# Patient Record
Sex: MALE | Race: AMERICAN INDIAN OR ALASKA NATIVE | Employment: OTHER | URBAN - METROPOLITAN AREA
[De-identification: names, ages, dates, MRNs, and addresses within clinical notes are randomized per-mention and may not be internally consistent; named-entity substitution may affect disease eponyms.]

---

## 2018-05-11 ENCOUNTER — HOSPITAL ENCOUNTER (OUTPATIENT)
Dept: LAB | Age: 83
Discharge: HOME | End: 2018-05-11

## 2018-05-11 LAB
ERYTHROCYTE [DISTWIDTH] IN BLOOD BY AUTOMATED COUNT: 15.6 % (ref 11.9–14.6)
HCT VFR BLD AUTO: 29.8 % (ref 41.1–50.3)
HGB BLD-MCNC: 9.7 G/DL (ref 13.6–17.2)
MCH RBC QN AUTO: 28.7 PG (ref 26.1–32.9)
MCHC RBC AUTO-ENTMCNC: 32.6 G/DL (ref 31.4–35)
MCV RBC AUTO: 88.2 FL (ref 79.6–97.8)
PLATELET # BLD AUTO: 553 K/UL (ref 150–450)
PMV BLD AUTO: 8.8 FL (ref 10.8–14.1)
RBC # BLD AUTO: 3.38 M/UL (ref 4.23–5.67)
WBC # BLD AUTO: 7.4 K/UL (ref 4.3–11.1)

## 2018-05-11 PROCEDURE — 85027 COMPLETE CBC AUTOMATED: CPT | Performed by: FAMILY MEDICINE

## 2018-06-05 PROBLEM — G47.00 INSOMNIA: Status: ACTIVE | Noted: 2018-05-09

## 2018-06-05 PROBLEM — I50.9 HEART FAILURE (HCC): Status: ACTIVE | Noted: 2018-05-06

## 2018-06-05 PROBLEM — T81.89XA PROBLEM INVOLVING SURGICAL INCISION: Status: ACTIVE | Noted: 2018-05-11

## 2018-06-05 PROBLEM — R53.81 DEBILITY: Status: ACTIVE | Noted: 2018-05-03

## 2018-06-05 PROBLEM — B36.9 FUNGAL SKIN INFECTION: Status: ACTIVE | Noted: 2018-05-11

## 2018-06-05 PROBLEM — K59.01 SLOW TRANSIT CONSTIPATION: Status: ACTIVE | Noted: 2018-05-16

## 2018-06-05 PROBLEM — I20.0 UNSTABLE ANGINA (HCC): Status: ACTIVE | Noted: 2018-04-24

## 2018-06-05 PROBLEM — R07.9 CHEST PAIN: Status: ACTIVE | Noted: 2018-04-23

## 2018-06-05 PROBLEM — R07.9 CHEST PAIN: Status: RESOLVED | Noted: 2018-04-23 | Resolved: 2018-06-05

## 2018-07-11 PROBLEM — R39.198 SLOW URINARY STREAM: Status: ACTIVE | Noted: 2018-07-11

## 2018-07-11 PROBLEM — I20.0 UNSTABLE ANGINA (HCC): Status: RESOLVED | Noted: 2018-04-24 | Resolved: 2018-07-11

## 2018-08-09 ENCOUNTER — HOSPITAL ENCOUNTER (OUTPATIENT)
Dept: GENERAL RADIOLOGY | Age: 83
Discharge: HOME | End: 2018-08-09
Payer: MEDICARE

## 2018-08-09 DIAGNOSIS — M54.50 CHRONIC BILATERAL LOW BACK PAIN WITHOUT SCIATICA: ICD-10-CM

## 2018-08-09 DIAGNOSIS — G89.29 CHRONIC BILATERAL LOW BACK PAIN WITHOUT SCIATICA: ICD-10-CM

## 2018-08-09 PROBLEM — I25.5 ISCHEMIC CARDIOMYOPATHY: Status: ACTIVE | Noted: 2018-06-22

## 2018-08-09 PROCEDURE — 72110 X-RAY EXAM L-2 SPINE 4/>VWS: CPT

## 2018-08-13 ENCOUNTER — HOSPITAL ENCOUNTER (OUTPATIENT)
Dept: PHYSICAL THERAPY | Age: 83
Discharge: HOME | End: 2018-08-13
Attending: INTERNAL MEDICINE
Payer: MEDICARE

## 2018-08-13 DIAGNOSIS — M51.37 DEGENERATION OF LUMBAR OR LUMBOSACRAL INTERVERTEBRAL DISC: ICD-10-CM

## 2018-08-13 PROCEDURE — 97140 MANUAL THERAPY 1/> REGIONS: CPT

## 2018-08-13 PROCEDURE — G8978 MOBILITY CURRENT STATUS: HCPCS

## 2018-08-13 PROCEDURE — 97161 PT EVAL LOW COMPLEX 20 MIN: CPT

## 2018-08-13 PROCEDURE — 97110 THERAPEUTIC EXERCISES: CPT

## 2018-08-13 PROCEDURE — G8979 MOBILITY GOAL STATUS: HCPCS

## 2018-08-13 NOTE — PROGRESS NOTES
Ambulatory/Rehab Services H2 Model Falls Risk Assessment    Risk Factor Pts. ·   Confusion/Disorientation/Impulsivity  []    4 ·   Symptomatic Depression  []   2 ·   Altered Elimination  []   1 ·   Dizziness/Vertigo  []   1 ·   Gender (Male)  [x]   1 ·   Any administered antiepileptics (anticonvulsants):  []   2 ·   Any administered benzodiazepines:  []   1 ·   Visual Impairment (specify):  []   1 ·   Portable Oxygen Use  []   1 ·   Orthostatic ? BP  []   1 ·   History of Recent Falls (within 3 mos.)  []   5     Ability to Rise from Chair (choose one) Pts. ·   Ability to rise in a single movement  []   0 ·   Pushes up, successful in one attempt  []   1 ·   Multiple attempts, but successful  []   3 ·   Unable to rise without assistance  []   4   Total: (5 or greater = High Risk) 1     Falls Prevention Plan:   []                Physical Limitations to Exercise (specify):   []                Mobility Assistance Device (type):   []                Exercise/Equipment Adaptation (specify):    ©2010 [facility] [address] Patent #[id number].  Federal Law prohibits the replication, distribution or use without written permission from [facility]

## 2018-08-13 NOTE — THERAPY EVALUATION
Stanislaw Ross  : 1935  [place] / [place] / Product Type: Medicare /  [address]  at [facility]  [address], [address], [address] [place] [address]  [phone]   [phone]       OUTPATIENT PHYSICAL THERAPY:Initial Assessment and Daily Note 2018    ICD-10: Treatment Diagnosis: Low back pain (M54.5)  Precautions/Allergies:   Antihistamines; Other medication; and Statins-hmg-coa reductase inhibitors   Fall Risk Score: 1 (? 5 = High Risk)  MD Orders: PT eval and treat MEDICAL/REFERRING DIAGNOSIS:  Degeneration of lumbar or lumbosacral intervertebral disc [M51.37]   DATE OF ONSET: 2018  REFERRING PHYSICIAN: Dalton Benz MD  RETURN PHYSICIAN APPOINTMENT: 9/10/18     ASSESSMENT:  Mr. Dhaval Key presents with several month history of lower back pain after having heart surgery in 2018 and spending a period of time being inactive. Pt demonstrates decreased lumbar range of motion particularly into extension, tight hamstrings, and decreased hip and core strength. Pt's pain and impairments are causing him to have difficulty getting up from a chair, bending forward and lifting objects, and standing in one place long periods of time. Pt will benefit from skilled PT to address his impairments and maximize function. PROBLEM LIST (Impacting functional limitations):  1. Decreased Strength  2. Decreased ADL/Functional Activities  3. Decreased Transfer Abilities  4. Increased Pain  5. Decreased Activity Tolerance  6. Decreased Flexibility/Joint Mobility INTERVENTIONS PLANNED:  1. Balance Exercise  2. Cold  3. Gait Training  4. Home Exercise Program (HEP)  5. Manual Therapy  6. Neuromuscular Re-education/Strengthening  7. Range of Motion (ROM)  8. Therapeutic Activites  9. Therapeutic Exercise/Strengthening     TREATMENT PLAN:  Effective Dates: 2018 TO 2018 (30 days).  Frequency/Duration: 1 time a week for 1 week, then 2x/week for 3 more weeks. GOALS: (Goals have been discussed and agreed upon with patient.)  Short-Term Functional Goals = Discharge Goals: Time Frame: 4 weeks  1. Pt will be I and compliant with HEP and symptom management strategies  2. Pt will be able to lift small objects from the floor with minimal pain or restrictions  3. Pt will report 75% improvement in getting up from a chair without needing support   4. Pt will report 7% improvement in ability to get up from bed in the morning  5. Pt will score 0/50 on Oswestry disability scale indicating normal function    Rehabilitation Potential For Stated Goals: Nic Pulido therapy, I certify that the treatment plan above will be carried out by a therapist or under their direction. Thank you for this referral,  Alfredo Velazco, PT                  The information in this section was collected on 8/13/18 (except where otherwise noted). HISTORY:   History of Present Injury/Illness (Reason for Referral): Shyanne Adhikari presents with several month history of low back pain, which started after he had heart surgery on 4/26/18. Pt reports it might be due to the fact that he was inactive for a period of time due to this surgery. He is otherwise an active person and walks 5x/week. Denies any radicular symptoms. Pain location: Low back both sides  Pain levels - Current: 1/10  At worst: 8/10  Description: Sharp   Increases pain:  Getting up from a chair or bed, bending over, standing in one place   Decreases pain:  Moving around    Past Medical History/Comorbidities:   Mr. Je Burrell  has a past medical history of Adhesive capsulitis of shoulder; Anemia, unspecified; Anxiety state, unspecified; AR (allergic rhinitis); Arthritis; CAD (coronary artery disease) (1995); [facility] ([facility]); Cerebrovascular disease, unspecified (5/27/2015); Cerumen impaction; Cervicalgia;  Chronic ethmoidal sinusitis; Chronic frontal sinusitis; Chronic ischemic heart disease, unspecified; Chronic lymphadenitis; Chronic maxillary sinusitis; Chronic pain; Coronary atherosclerosis of native coronary artery (5/27/2015); Degeneration of lumbar or lumbosacral intervertebral disc; Deviated septum; ED (erectile dysfunction); Fatigue; GERD (gastroesophageal reflux disease); H/O prostate cancer (2002); H/O seasonal allergies; High frequency hearing loss; HLD (hyperlipidemia) (6/4/2014); Hypercholesteremia; Hypertension; Hypertrophy of nasal turbinates; Loss of weight (8/13/2013); Malignant neoplasm of prostate ([facility] (8/13/2013); MI (myocardial infarction) ([facility] (1995); Nasal obstruction; Neuralgia, neuritis, and radiculitis, unspecified; Night sweat (8/19/2013); OA (osteoarthritis); Obstructive sleep apnea (10/23/2013); Organic hypersomnia, 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 Mr. Magdalena Cabrera  has a past surgical history that includes hx ptca (1995); hx hernia repair (Right, 2007); hx orthopaedic; hx lumbar laminectomy (3/2013); hx back surgery; hx other surgical (2016); hx cataract removal (Bilateral); hx tonsillectomy (childhood); pr sinus surgery proc unlisted (5/5/2016); hx heent; hx heent; and pr cardiac surg procedure unlist (04/26/2018). 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 , Disp: 60 Tab, Rfl: 6    doxazosin (CARDURA) 2 mg tablet, Take 1 Tab by mouth daily. (Patient taking differently: Take 1 mg by mouth daily.), Disp: 30 Tab, Rfl: 6    isosorbide mononitrate ER (IMDUR) 30 mg tablet, Take  by mouth daily. , Disp: , Rfl:    turmeric root extract 500 mg cap, Take 500 mg by mouth two (2) times a day., Disp: , Rfl:     cyanocobalamin (VITAMIN B-12) 1,000 mcg tablet, Take 1,000 mcg by mouth daily. , Disp: , Rfl:     omega 3-dha-epa-fish oil (FISH OIL) 100-160-1,000 mg cap, Take  by mouth daily. , Disp: , Rfl:     vitamin e (E GEMS) 100 unit capsule, Take  by mouth daily. , Disp: , Rfl:     niacin ER (NIASPAN) 500 mg tablet, Take 1 Tab by mouth nightly., Disp: 90 Tab, Rfl: 4    mirtazapine (REMERON) 15 mg tablet, Take 1 Tab by mouth nightly., Disp: 30 Tab, Rfl: 6    ezetimibe (ZETIA) 10 mg tablet, TAKE 1 TABLET DAILY, Disp: 30 Tab, Rfl: 0    pantoprazole (PROTONIX) 40 mg tablet, TAKE 1 TABLET DAILY, Disp: 90 Tab, Rfl: 3    ASPIRIN 81 mg Tab, Take 81 mg by mouth nightly., Disp: , Rfl:    Date Last Reviewed:  8/13/2018     Number of Personal Factors/Comorbidities that affect the Plan of Care: 0: LOW COMPLEXITY   EXAMINATION:     Observation/Postural Assessment: Increased thoracic kyphosis, decreased/flattened lumbar lordosis, posterior pelvic tilt    Palpation: Tenderness and decreased mobility throughout mid/lower thoracic spine, L-spine and sacrum    ROM:   Trunk flexion: 80% - tight hamstrings noted  Trunk extension: 50%  Trunk sidebend: 100% B    Strength: 4/5 B LEs    Neurological Screen: Sensation intact to light touch B LEs       Body Structures Involved:  1. Joints  2. Muscles Body Functions Affected:  1. Neuromusculoskeletal  2. Movement Related Activities and Participation Affected:  1. General Tasks and Demands  2. Mobility   Number of elements that affect the Plan of Care: 4+: HIGH COMPLEXITY   CLINICAL PRESENTATION:   Presentation: Stable and uncomplicated: LOW COMPLEXITY   CLINICAL DECISION MAKING:   Outcome Measure:    Tool Used: Modified Oswestry Low Back Pain Questionnaire  Score:  Initial: 10/50  Most Recent: X/50 (Date: -- )   Interpretation of Score: Each section is scored on a 0-5 scale, 5 representing the greatest disability. The scores of each section are added together for a total score of 50. Score 0 1-10 11-20 21-30 31-40 41-49 50   Modifier CH CI CJ CK CL CM CN     ? Mobility - Walking and Moving Around:     - CURRENT STATUS: CI - 1%-19% impaired, limited or restricted    - GOAL STATUS: CH - 0% impaired, limited or restricted    - D/C STATUS:  ---------------To be determined---------------    Medical Necessity:   · Patient demonstrates good rehab potential due to higher previous functional level. Reason for Services/Other Comments:  · Patient continues to require skilled intervention due to decreased range of motion and joint/muscle stiffness, as well as decreased muscle strength contributing to decreased functional status. Use of outcome tool(s) and clinical judgement create a POC that gives a: Clear prediction of patient's progress: LOW COMPLEXITY            TREATMENT:   (In addition to Assessment/Re-Assessment sessions the following treatments were rendered)    Present Symptoms/Complaints - 8/13/2018:  See hx  Pain: Initial:   Pain Intensity 1: 2  Post Session:  unchanged       Therapeutic Exercise: (15 minutes):  Exercises per grid below to improve mobility, strength and balance. Required moderate visual, verbal and tactile cues to promote proper body alignment, promote proper body posture and promote proper body mechanics. Progressed resistance, range and repetitions as indicated.      Date:  8/13/18 Date:   Date:     Activity/Exercise Parameters Parameters Parameters   Pt education Findings, anatomy/pathology, POC, HEP     Standing extensions 20x // bars, 10x no bars     Clamshell 15x B     SLR 15x B     Prone hip extension 10x B     Seated hamstring stretch 30s hold B               Manual Therapy: (10 minutes): was utilized and necessary because of the patient's restricted joint motion and restricted motion of soft tissue.   - PA glides grades II - III throughout lumbar and thoracic spine    Treatment/Session Assessment:  Pt understood educational interventions and agreed with plan. All above given for HEP. · Compliance with Program/Exercises: Will assess as treatment progresses. · Recommendations/Intent for next treatment session: Next visit will focus on advancements to more challenging activities.   · Variance from plan of care: None    Total Treatment Duration:  PT Patient Time In/Time Out  Time In: 0918  Time Out: Ezio [address]man [address], PT

## 2018-08-20 ENCOUNTER — HOSPITAL ENCOUNTER (OUTPATIENT)
Dept: PHYSICAL THERAPY | Age: 83
Discharge: HOME | End: 2018-08-20
Attending: INTERNAL MEDICINE
Payer: MEDICARE

## 2018-08-20 PROCEDURE — 97110 THERAPEUTIC EXERCISES: CPT

## 2018-08-20 PROCEDURE — G8979 MOBILITY GOAL STATUS: HCPCS

## 2018-08-20 PROCEDURE — G8980 MOBILITY D/C STATUS: HCPCS

## 2018-08-20 NOTE — THERAPY DISCHARGE
Roberto Gamez  : 1935  [place] / Product Type: Medicare /  [address]  at [facility]  [address], [address], [place] [address]  [phone]   [phone]       OUTPATIENT PHYSICAL THERAPY:Daily Note and Discharge 2018    ICD-10: Treatment Diagnosis: Low back pain (M54.5)  Precautions/Allergies:   Antihistamines; Other medication; and Statins-hmg-coa reductase inhibitors   Fall Risk Score: 1 (? 5 = High Risk)  MD Orders: PT eval and treat MEDICAL/REFERRING DIAGNOSIS:  Other intervertebral disc degeneration, lumbosacral region [M51.37]   DATE OF ONSET: 2018  REFERRING PHYSICIAN: Thai Ramey MD  RETURN PHYSICIAN APPOINTMENT: 9/10/18     ASSESSMENT:  Mr. Magdalena Cabrera has participated in 2 PT visits and has been compliant with HEP for the past week. Pt demonstrates improved mobility however has had no changes in symptoms in the past week. Pt understands his HEP well with some instruction today and reports he is due to follow-up with pain management. Pt is discharged from PT. TREATMENT PLAN:  Effective Dates: 2018 TO 2018 (30 days). Frequency/Duration: 1 time a week for 1 week, then 2x/week for 3 more weeks. GOALS: (Goals have been discussed and agreed upon with patient.)  Short-Term Functional Goals = Discharge Goals: Time Frame: 4 weeks  1. Pt will be I and compliant with HEP and symptom management strategies - met  2. Pt will be able to lift small objects from the floor with minimal pain or restrictions- not met  3. Pt will report 75% improvement in getting up from a chair without needing support - not met  4. Pt will report 7% improvement in ability to get up from bed in the morning- not met  5. Pt will score 0/50 on Oswestry disability scale indicating normal function- not met              The information in this section was collected on 18 (except where otherwise noted).   HISTORY: History of Present Injury/Illness (Reason for Referral): Sveta Meza presents with several month history of low back pain, which started after he had heart surgery on 4/26/18. Pt reports it might be due to the fact that he was inactive for a period of time due to this surgery. He is otherwise an active person and walks 5x/week. Denies any radicular symptoms. Pain location: Low back both sides  Pain levels - Current: 1/10  At worst: 8/10  Description: Sharp   Increases pain:  Getting up from a chair or bed, bending over, standing in one place   Decreases pain:  Moving around    Past Medical History/Comorbidities:   Mr. Haritha Duran  has a past medical history of Adhesive capsulitis of shoulder; Anemia, unspecified; Anxiety state, unspecified; AR (allergic rhinitis); Arthritis; CAD (coronary artery disease) (1995); Cancer ([address] (2002); Cerebrovascular disease, unspecified (5/27/2015); Cerumen impaction; Cervicalgia; Chronic ethmoidal sinusitis; Chronic frontal sinusitis; Chronic ischemic heart disease, unspecified; Chronic lymphadenitis; Chronic maxillary sinusitis; Chronic pain; Coronary atherosclerosis of native coronary artery (5/27/2015); Degeneration of lumbar or lumbosacral intervertebral disc; Deviated septum; ED (erectile dysfunction); Fatigue; GERD (gastroesophageal reflux disease); H/O prostate cancer (2002); H/O seasonal allergies; High frequency hearing loss; HLD (hyperlipidemia) (6/4/2014); Hypercholesteremia; Hypertension; Hypertrophy of nasal turbinates; Loss of weight (8/13/2013); Malignant neoplasm of prostate ([address] (8/13/2013); MI (myocardial infarction) ([address] (1995); Nasal obstruction; Neuralgia, neuritis, and radiculitis, unspecified; Night sweat (8/19/2013); OA (osteoarthritis); Obstructive sleep apnea (10/23/2013); Organic hypersomnia, 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    Mr. Haritha Duran  has a past surgical history that includes hx ptca (1995); hx hernia repair (Right, 2007); hx orthopaedic; hx lumbar laminectomy (3/2013); hx back surgery; hx other surgical (2016); hx cataract removal (Bilateral); hx tonsillectomy (childhood); pr sinus surgery proc unlisted (5/5/2016); hx heent; hx heent; and pr cardiac surg procedure unlist (04/26/2018). 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 , Disp: 60 Tab, Rfl: 6    doxazosin (CARDURA) 2 mg tablet, Take 1 Tab by mouth daily. (Patient taking differently: Take 1 mg by mouth daily.), Disp: 30 Tab, Rfl: 6    isosorbide mononitrate ER (IMDUR) 30 mg tablet, Take  by mouth daily. , Disp: , Rfl:     turmeric root extract 500 mg cap, Take 500 mg by mouth two (2) times a day., Disp: , Rfl:     cyanocobalamin (VITAMIN B-12) 1,000 mcg tablet, Take 1,000 mcg by mouth daily. , Disp: , Rfl:     omega 3-dha-epa-fish oil (FISH OIL) 100-160-1,000 mg cap, Take  by mouth daily. , Disp: , Rfl:     vitamin e (E GEMS) 100 unit capsule, Take  by mouth daily. , Disp: , Rfl:     niacin ER (NIASPAN) 500 mg tablet, Take 1 Tab by mouth nightly., Disp: 90 Tab, Rfl: 4    mirtazapine (REMERON) 15 mg tablet, Take 1 Tab by mouth nightly., Disp: 30 Tab, Rfl: 6    ezetimibe (ZETIA) 10 mg tablet, TAKE 1 TABLET DAILY, Disp: 30 Tab, Rfl: 0    pantoprazole (PROTONIX) 40 mg tablet, TAKE 1 TABLET DAILY, Disp: 90 Tab, Rfl: 3    ASPIRIN 81 mg Tab, Take 81 mg by mouth nightly., Disp: , Rfl:    Date Last Reviewed:  8/20/2018     EXAMINATION:     Observation/Postural Assessment: Increased thoracic kyphosis, decreased/flattened lumbar lordosis, posterior pelvic tilt    Palpation: Tenderness and decreased mobility throughout mid/lower thoracic spine, L-spine and sacrum    ROM:   Trunk flexion: 80% - tight hamstrings noted  Trunk extension: 50%  Trunk sidebend: 100% B    Strength: 4/5 B LEs    Neurological Screen: Sensation intact to light touch B LEs       CLINICAL DECISION MAKING:   Outcome Measure: Tool Used: Modified Oswestry Low Back Pain Questionnaire  Score:  Initial: 10/50  Most Recent: 10/50 (Date: 8/20/18 )   Interpretation of Score: Each section is scored on a 0-5 scale, 5 representing the greatest disability. The scores of each section are added together for a total score of 50. Score 0 1-10 11-20 21-30 31-40 41-49 50   Modifier CH CI CJ CK CL CM CN     ? Mobility - Walking and Moving Around:     - CURRENT STATUS: CI - 1%-19% impaired, limited or restricted    - GOAL STATUS: CH - 0% impaired, limited or restricted    - D/C STATUS:  CI - 1%-19% impaired, limited or restricted              TREATMENT:   (In addition to Assessment/Re-Assessment sessions the following treatments were rendered)    Present Symptoms/Complaints - 8/20/2018:  Pt reports he's been doing the exercises. He says his mobility is better but that his pain is the same. Pain: Initial:   Pain Intensity 1: 2  Post Session:  unchanged       Therapeutic Exercise: (40 minutes):  Exercises per grid below to improve mobility, strength and balance. Required moderate visual, verbal and tactile cues to promote proper body alignment, promote proper body posture and promote proper body mechanics. Progressed resistance, range and repetitions as indicated.      Date:  8/13/18 Date:  8/20/18 Date:     Activity/Exercise Parameters Parameters Parameters   Pt education Findings, anatomy/pathology, POC, HEP     Nustep  10 min    Standing extensions 20x // bars, 10x no bars 20x     Clamshell 15x B 15x B    SLR 15x B 15x B    Prone hip extension 10x B     Seated hamstring stretch 30s hold B 30s hold B            Treatment/Session Assessment:  Pt understands HEP with some cues required today.     Total Treatment Duration:  PT Patient Time In/Time Out  Time In: 0815  Time Out: 0855    Catherine Dawkins, PT

## 2018-09-13 PROBLEM — R53.81 DEBILITY: Status: RESOLVED | Noted: 2018-05-03 | Resolved: 2018-09-13

## 2018-09-13 PROBLEM — Z95.1 HX OF CABG: Status: ACTIVE | Noted: 2018-04-26

## 2018-09-13 PROBLEM — T81.89XA PROBLEM INVOLVING SURGICAL INCISION: Status: RESOLVED | Noted: 2018-05-11 | Resolved: 2018-09-13

## 2019-02-11 ENCOUNTER — HOSPITAL ENCOUNTER (EMERGENCY)
Age: 84
Discharge: HOME | End: 2019-02-11
Attending: EMERGENCY MEDICINE
Payer: MEDICARE

## 2019-02-11 ENCOUNTER — APPOINTMENT (OUTPATIENT)
Dept: GENERAL RADIOLOGY | Age: 84
End: 2019-02-11
Attending: EMERGENCY MEDICINE
Payer: MEDICARE

## 2019-02-11 VITALS
WEIGHT: 136 LBS | HEART RATE: 72 BPM | BODY MASS INDEX: 21.86 KG/M2 | HEIGHT: 66 IN | RESPIRATION RATE: 16 BRPM | TEMPERATURE: 98.4 F | SYSTOLIC BLOOD PRESSURE: 123 MMHG | OXYGEN SATURATION: 98 % | DIASTOLIC BLOOD PRESSURE: 73 MMHG

## 2019-02-11 DIAGNOSIS — M54.50 LUMBAR PAIN: Primary | ICD-10-CM

## 2019-02-11 PROCEDURE — 99283 EMERGENCY DEPT VISIT LOW MDM: CPT | Performed by: EMERGENCY MEDICINE

## 2019-02-11 PROCEDURE — 72100 X-RAY EXAM L-S SPINE 2/3 VWS: CPT

## 2019-02-11 PROCEDURE — 96372 THER/PROPH/DIAG INJ SC/IM: CPT | Performed by: EMERGENCY MEDICINE

## 2019-02-11 PROCEDURE — 74011250636 HC RX REV CODE- 250/636: Performed by: EMERGENCY MEDICINE

## 2019-02-11 RX ORDER — KETOROLAC TROMETHAMINE 30 MG/ML
15 INJECTION, SOLUTION INTRAMUSCULAR; INTRAVENOUS
Status: COMPLETED | OUTPATIENT
Start: 2019-02-11 | End: 2019-02-11

## 2019-02-11 RX ORDER — HYDROCODONE BITARTRATE AND ACETAMINOPHEN 5; 325 MG/1; MG/1
1 TABLET ORAL
Qty: 6 TAB | Refills: 0 | Status: SHIPPED | OUTPATIENT
Start: 2019-02-11 | End: 2019-05-04

## 2019-02-11 RX ADMIN — KETOROLAC TROMETHAMINE 15 MG: 30 INJECTION, SOLUTION INTRAMUSCULAR at 10:17

## 2019-02-11 NOTE — ED TRIAGE NOTES
Pt states pain in lower back for about two days. States he gets shots at the pain clinic for back pain but it was not having any pain after the shots. Denies new injury to the back.

## 2019-02-11 NOTE — ED PROVIDER NOTES
52-year-old male with a history of chronic back pain and prior lumbar laminectomy followed by pain management presents with increased low back pain for the past 3 days. He had steroid injections about 2 months ago. When he followed up at pain management 2 weeks ago, he had had no recurrence of pain so had no treatment. Pain has gradually worsened without any new activity or injury. Denies fevers or chills. Has history of prostate cancer. No radiation of pain down the legs. No focal numbness or weakness. No loss of bladder or bowel control. He has not tried anything at home for pain except tylenol. Pain worse when getting up out of a chair. The history is provided by the patient. Back Pain Pertinent negatives include no chest pain, no fever, no headaches, no abdominal pain and no weakness. Past Medical History:  
Diagnosis Date  Adhesive capsulitis of shoulder  Anemia, unspecified  Anxiety state, unspecified  AR (allergic rhinitis)  Arthritis   
 osteo generalized  CAD (coronary artery disease) 1995 MI, angioplasty  Cancer [facility]) 2002  
 prostate/xrt  Cerebrovascular disease, unspecified 5/27/2015  Cerumen impaction  Cervicalgia  Chronic ethmoidal sinusitis  Chronic frontal sinusitis  Chronic ischemic heart disease, unspecified  Chronic lymphadenitis  Chronic maxillary sinusitis  Chronic pain   
 lower back  Coronary atherosclerosis of native coronary artery 5/27/2015  Degeneration of lumbar or lumbosacral intervertebral disc  Deviated septum  ED (erectile dysfunction)  Fatigue  GERD (gastroesophageal reflux disease)   
 managed with medication  H/O prostate cancer 2002  
 treated with radiation  H/O seasonal allergies   
 managed with Nasocort spray  High frequency hearing loss  HLD (hyperlipidemia) 6/4/2014  Hypercholesteremia   
 managed with medication  Hypertension controlled with meds  Hypertrophy of nasal turbinates  Loss of weight 8/13/2013  Malignant neoplasm of prostate ([address] 8/13/2013  MI (myocardial infarction) ([address] 1995  Nasal obstruction  Neuralgia, neuritis, and radiculitis, unspecified  Night sweat 8/19/2013  OA (osteoarthritis)  Obstructive sleep apnea 10/23/2013  
 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   
 had radiation 2002  Sleep apnea  SNHL (sensorineural hearing loss) Past Surgical History:  
Procedure Laterality Date  CARDIAC SURG PROCEDURE UNLIST  04/26/2018  HX BACK SURGERY    
 HX CATARACT REMOVAL Bilateral   
 bilateral with lens implant  HX HEENT    
 S/P SMRITs, Balloon Sinuplasty  HX HEENT    
 3 different eye surgeries  HX HERNIA REPAIR Right 2007 RIH  
 HX LUMBAR LAMINECTOMY  3/2013 Dr. Logan Bence  HX ORTHOPAEDIC    
 multiple TREVOR  HX OTHER SURGICAL  2016  
 sinus  HX PTCA  1995  
 angioplasty  HX TONSILLECTOMY  childhood  SINUS SURGERY PROC UNLISTED  5/5/2016  
 sinus surgery-Dr Campbell Camacho Family History:  
Problem Relation Age of Onset  Heart Disease Father  Prostate Cancer Father  Heart Disease Mother  Stroke Mother  Cancer Sister   
     breast  
 
 
Social History Socioeconomic History  Marital status:  Spouse name: Not on file  Number of children: Not on file  Years of education: Not on file  Highest education level: Not on file Social Needs  Financial resource strain: Not on file  Food insecurity - worry: Not on file  Food insecurity - inability: Not on file  Transportation needs - medical: Not on file  Transportation needs - non-medical: Not on file Occupational History  Not on file Tobacco Use  Smoking status: Current Some Day Smoker Packs/day: 0.25   Years: 3.00  
 Pack years: 0.75  Smokeless tobacco: Never Used  Tobacco comment: Pipe and cigar Substance and Sexual Activity  Alcohol use: Yes Comment: rare occ 1 or 2 beers in summer, states he is only smoking electric cig occ.  Drug use: No  
 Sexual activity: Not on file Other Topics Concern  Not on file Social History Narrative  Not on file ALLERGIES: Antihistamines; Other medication; and Statins-hmg-coa reductase inhibitors Review of Systems Constitutional: Negative for chills and fever. HENT: Negative for hearing loss. Eyes: Negative for visual disturbance. Respiratory: Negative for cough and shortness of breath. Cardiovascular: Negative for chest pain and palpitations. Gastrointestinal: Negative for abdominal pain, diarrhea, nausea and vomiting. Genitourinary: Negative for difficulty urinating. Musculoskeletal: Positive for back pain. Skin: Negative for rash. Neurological: Negative for weakness and headaches. Psychiatric/Behavioral: Negative for confusion. Vitals:  
 02/11/19 1522 BP: 119/69 Pulse: 77 Resp: 16 Temp: 98.2 °F (36.8 °C) SpO2: 97% Weight: 61.7 kg (136 lb) Height: 5' 6\" (1.676 m) Physical Exam  
Constitutional: He appears well-developed and well-nourished. HENT:  
Head: Normocephalic and atraumatic. Right Ear: External ear normal.  
Left Ear: External ear normal.  
Nose: Nose normal.  
Mouth/Throat: Oropharynx is clear and moist.  
Eyes: Conjunctivae are normal. Pupils are equal, round, and reactive to light. Neck: Normal range of motion. Neck supple. Cardiovascular: Regular rhythm, normal heart sounds and intact distal pulses. Pulmonary/Chest: Effort normal and breath sounds normal. No respiratory distress. He has no wheezes. Abdominal: Soft. Bowel sounds are normal. He exhibits no distension. There is no tenderness. Musculoskeletal: Normal range of motion. He exhibits no edema. Lumbar back: He exhibits pain. He exhibits no tenderness. Back: 
 
Neurological: He is alert. He has normal strength. No cranial nerve deficit or sensory deficit. Normal strength and sensation bilateral lower legs Skin: Skin is warm and dry. Psychiatric: Judgment normal.  
Nursing note and vitals reviewed. MDM Number of Diagnoses or Management Options Diagnosis management comments: Parts of this document were created using dragon voice recognition software. The chart has been reviewed but errors may still be present. 10:58 AM 
Given toradol. Prior Cr normal. Advised to follow up with pain management. Will give few pills of norco for severe pain. I discussed the results of all labs, procedures, radiographs, and treatments with the patient and available family. Treatment plan is agreed upon and the patient is ready for discharge. Questions about treatment in the ED and differential diagnosis of presenting condition were answered. Patient was given verbal discharge instructions including, but not limited to, importance of returning to the emergency department for any concern of worsening or continued symptoms. Instructions were given to follow up with a primary care provider or specialist within 1-2 days. Adverse effects of medications, if prescribed, were discussed and patient was advised to refrain from significant physical activity until followed up by primary care physician and to not drive or operate heavy machinery after taking any sedating substances. Amount and/or Complexity of Data Reviewed Tests in the radiology section of CPT®: ordered and reviewed (Xr Spine Lumb 2 Or 3 V Result Date: 2/11/2019 HISTORY: Low back pain, 3 days duration. EXAM:  Lumbar spine series COMPARISON: 8/9/2018 FINDINGS: Left-sided fusion hardware again noted at L4-5.  There is near complete loss of disc space at L5-S1 with subarticular endplate sclerosis. There is multilevel facet arthropathy. There is atherosclerotic calcification of the abdominal aorta. The SI joints are patent. The pedicles are intact. IMPRESSION: Stable postsurgical change without evidence of hardware complication. Stable multilevel lumbar spondylosis. ) Tests in the medicine section of CPT®: reviewed and ordered Procedures

## 2019-02-11 NOTE — ED NOTES
I have reviewed discharge instructions with the patient. The patient verbalized understanding. Patient left ED via Discharge Method: ambulatory to Home Opportunity for questions and clarification provided. Patient given 1 scripts. To continue your aftercare when you leave the hospital, you may receive an automated call from our care team to check in on how you are doing. This is a free service and part of our promise to provide the best care and service to meet your aftercare needs.  If you have questions, or wish to unsubscribe from this service please call [phone]. Thank you for Choosing our [facility] Emergency Department.

## 2019-02-11 NOTE — DISCHARGE INSTRUCTIONS
Follow-up with pain management for further treatment. Take Norco pain medicine only as needed for severe pain. Do not take additional Tylenol and do not drive while taking this medication. Return for any worsening or concerning symptoms.

## 2019-03-18 ENCOUNTER — HOSPITAL ENCOUNTER (OUTPATIENT)
Dept: GENERAL RADIOLOGY | Age: 84
Discharge: HOME | End: 2019-03-18

## 2019-03-18 DIAGNOSIS — R06.02 SOB (SHORTNESS OF BREATH): ICD-10-CM

## 2019-05-04 ENCOUNTER — HOSPITAL ENCOUNTER (EMERGENCY)
Age: 84
Discharge: HOME | End: 2019-05-04
Attending: EMERGENCY MEDICINE
Payer: MEDICARE

## 2019-05-04 ENCOUNTER — APPOINTMENT (OUTPATIENT)
Dept: GENERAL RADIOLOGY | Age: 84
End: 2019-05-04
Attending: EMERGENCY MEDICINE
Payer: MEDICARE

## 2019-05-04 VITALS
RESPIRATION RATE: 16 BRPM | DIASTOLIC BLOOD PRESSURE: 70 MMHG | HEART RATE: 87 BPM | OXYGEN SATURATION: 99 % | SYSTOLIC BLOOD PRESSURE: 122 MMHG | TEMPERATURE: 98.1 F | BODY MASS INDEX: 22.02 KG/M2 | HEIGHT: 66 IN | WEIGHT: 137 LBS

## 2019-05-04 DIAGNOSIS — M25.512 PAIN IN JOINT OF LEFT SHOULDER: Primary | ICD-10-CM

## 2019-05-04 PROCEDURE — 99283 EMERGENCY DEPT VISIT LOW MDM: CPT | Performed by: EMERGENCY MEDICINE

## 2019-05-04 PROCEDURE — 96372 THER/PROPH/DIAG INJ SC/IM: CPT | Performed by: EMERGENCY MEDICINE

## 2019-05-04 PROCEDURE — 74011250636 HC RX REV CODE- 250/636: Performed by: EMERGENCY MEDICINE

## 2019-05-04 PROCEDURE — 74011250637 HC RX REV CODE- 250/637: Performed by: EMERGENCY MEDICINE

## 2019-05-04 PROCEDURE — 73030 X-RAY EXAM OF SHOULDER: CPT

## 2019-05-04 RX ORDER — KETOROLAC TROMETHAMINE 30 MG/ML
60 INJECTION, SOLUTION INTRAMUSCULAR; INTRAVENOUS
Status: COMPLETED | OUTPATIENT
Start: 2019-05-04 | End: 2019-05-04

## 2019-05-04 RX ORDER — METHOCARBAMOL 750 MG/1
1500 TABLET, FILM COATED ORAL
Status: COMPLETED | OUTPATIENT
Start: 2019-05-04 | End: 2019-05-04

## 2019-05-04 RX ORDER — HYDROCODONE BITARTRATE AND ACETAMINOPHEN 5; 325 MG/1; MG/1
1 TABLET ORAL
Qty: 5 TAB | Refills: 0 | Status: SHIPPED | OUTPATIENT
Start: 2019-05-04 | End: 2019-05-06

## 2019-05-04 RX ADMIN — METHOCARBAMOL 1500 MG: 750 TABLET ORAL at 10:10

## 2019-05-04 RX ADMIN — KETOROLAC TROMETHAMINE 60 MG: 30 INJECTION, SOLUTION INTRAMUSCULAR at 10:10

## 2019-05-04 NOTE — ED PROVIDER NOTES
81-year-old male presenting for acute on chronic left shoulder pain. Symptoms started a week ago. The pain persistent. It hurts to elevate his arm. He's had this pain before. He called his doctor for pain control is prescribed tramadol he states \"that doesn't work\". He denies neck pain, chest pain, short of breath. The pain and describes aching pain in the anterior compartment of the deltoid and some pain in the left thumb. She made worse when he tries to elevate the arm. He denies any fall or direct blow to the shoulder. He's also been seen by his cardiologist in the past week while having the shoulder pain and referred to orthopedics this appointment is Monday and states \"I want to be in pain all weekend\". He's tried no other medications except tramadol. He denies weakness. He denies paresthesias. The history is provided by the patient. Arm Pain This is a recurrent problem. The current episode started more than 1 week ago. The problem occurs constantly. The problem has not changed since onset. Pain location: left shoulder. The quality of the pain is described as dull and aching. The pain is at a severity of 4/10. The pain is moderate. Associated symptoms include limited range of motion and stiffness. Pertinent negatives include no numbness, no tingling, no itching, no back pain and no neck pain. The symptoms are aggravated by activity. He has tried rest for the symptoms. The treatment provided mild relief. There has been no history of extremity trauma. Past Medical History:  
Diagnosis Date  Adhesive capsulitis of shoulder  Anemia, unspecified  Anxiety state, unspecified  AR (allergic rhinitis)  Arthritis   
 osteo generalized  CAD (coronary artery disease) 1995 MI, angioplasty  Cancer [facility]) 2002  
 prostate/xrt  Cerebrovascular disease, unspecified 5/27/2015  Cerumen impaction  Cervicalgia  Chronic ethmoidal sinusitis  Chronic frontal sinusitis  Chronic ischemic heart disease, unspecified  Chronic lymphadenitis  Chronic maxillary sinusitis  Chronic pain   
 lower back  Coronary atherosclerosis of native coronary artery 5/27/2015  Degeneration of lumbar or lumbosacral intervertebral disc  Deviated septum  ED (erectile dysfunction)  Fatigue  GERD (gastroesophageal reflux disease)   
 managed with medication  H/O prostate cancer 2002  
 treated with radiation  H/O seasonal allergies   
 managed with Nasocort spray  High frequency hearing loss  HLD (hyperlipidemia) 6/4/2014  Hypercholesteremia   
 managed with medication  Hypertension   
 controlled with meds  Hypertrophy of nasal turbinates  Loss of weight 8/13/2013  Malignant neoplasm of [address] [address] 8/13/2013  MI (myocardial infarction) ([facility] [address] 1995  Nasal obstruction  Neuralgia, neuritis, and radiculitis, unspecified  Night sweat 8/19/2013  OA (osteoarthritis)  Obstructive sleep apnea 10/23/2013  
 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   
 had radiation 2002  Sleep apnea  SNHL (sensorineural hearing loss) Past Surgical History:  
Procedure Laterality Date  CARDIAC SURG PROCEDURE UNLIST  04/26/2018  HX BACK SURGERY    
 HX CATARACT REMOVAL Bilateral   
 bilateral with lens implant  HX HEENT    
 S/P SMRITs, Balloon Sinuplasty  HX HEENT    
 3 different eye surgeries  HX HERNIA REPAIR Right 2007 RIH  
 HX LUMBAR LAMINECTOMY  3/2013 Dr. Michael Barron  HX ORTHOPAEDIC    
 multiple TREVOR  HX OTHER SURGICAL  2016  
 sinus  HX PTCA  1995  
 angioplasty  HX TONSILLECTOMY  childhood  SINUS SURGERY PROC UNLISTED  5/5/2016  
 sinus surgery-Dr Anabelle Weber Family History:  
Problem Relation Age of Onset  Heart Disease Father  Prostate Cancer Father  Heart Disease Mother  Stroke Mother  Cancer Sister   
     breast  
 
 
Social History Socioeconomic History  Marital status:  Spouse name: Not on file  Number of children: Not on file  Years of education: Not on file  Highest education level: Not on file Occupational History  Not on file Social Needs  Financial resource strain: Not on file  Food insecurity:  
  Worry: Not on file Inability: Not on file  Transportation needs:  
  Medical: Not on file Non-medical: Not on file Tobacco Use  Smoking status: Current Some Day Smoker Packs/day: 0.25 Years: 3.00 Pack years: 0.75  Smokeless tobacco: Never Used  Tobacco comment: Pipe and cigar Substance and Sexual Activity  Alcohol use: Yes Comment: rare occ 1 or 2 beers in summer, states he is only smoking electric cig occ.  Drug use: No  
  Types: Prescription, OTC  Sexual activity: Not on file Lifestyle  Physical activity:  
  Days per week: Not on file Minutes per session: Not on file  Stress: Not on file Relationships  Social connections:  
  Talks on phone: Not on file Gets together: Not on file Attends [employer] service: Not on file Active member of club or organization: Not on file Attends meetings of clubs or organizations: Not on file Relationship status: Not on file  Intimate partner violence:  
  Fear of current or ex partner: Not on file Emotionally abused: Not on file Physically abused: Not on file Forced sexual activity: Not on file Other Topics Concern  Not on file Social History Narrative  Not on file ALLERGIES: Antihistamines; Other medication; and Statins-hmg-coa reductase inhibitors Review of Systems Musculoskeletal: Positive for arthralgias, joint swelling and stiffness. Negative for back pain and neck pain. Skin: Negative for itching. Neurological: Negative for tingling and numbness. All other systems reviewed and are negative. Vitals:  
 05/04/19 0609 BP: 125/66 Pulse: 94 Resp: 14 Temp: 97.7 °F (36.5 °C) SpO2: 96% Weight: 62.1 kg (137 lb) Height: 5' 6\" (1.676 m) Physical Exam  
Constitutional: He is oriented to person, place, and time. He appears well-developed and well-nourished. HENT:  
Head: Normocephalic and atraumatic. Eyes: Pupils are equal, round, and reactive to light. Conjunctivae and EOM are normal.  
Neck: Normal range of motion. Neck supple. Cardiovascular: Normal rate, regular rhythm, normal heart sounds and intact distal pulses. Pulmonary/Chest: Effort normal and breath sounds normal.  
Abdominal: Soft. Bowel sounds are normal.  
Musculoskeletal: He exhibits no deformity. Decreased range of motion and stiffness in the left shoulder otherwise normal extremity exam patient able to supinate and pronate the hand without difficulty. Extension flexion at the elbow is normal.  
Neurological: He is alert and oriented to person, place, and time. No cranial nerve deficit. Skin: Skin is warm and dry. Psychiatric: He has a normal mood and affect. His behavior is normal.  
Nursing note and vitals reviewed. MDM Number of Diagnoses or Management Options Pain in joint of left shoulder:  
Diagnosis management comments: 7year-old male presented for acute on chronic left shoulder pain. Physical exam consistent with spasm of the shoulder. We will get an x-ray to rule out any cervical bony abnormality. Patient's taking tramadol without relief. The patient Toradol and Robaxin here in the emergency department and see if that works I reviewed the patient's meds is on blood thinners has no history of GI bleeding. Given her dose of Toradol should be low risk Amount and/or Complexity of Data Reviewed Independent visualization of images, tracings, or specimens: yes (Review the x-ray) Risk of Complications, Morbidity, and/or Mortality Presenting problems: low Diagnostic procedures: low Management options: low General comments: Patient doing somewhat better. Patient Progress Patient progress: improved ED Course as of May 04 1037 Sat May 04, 2019  
1036 Patient had some relief after Toradol and Robaxin. Sitting home with 5 tablets of Norco and he has follow-up with orthopedics on Monday. [JS] ED Course User Index [JS] Myra Reddy MD  
 
 
Procedures

## 2019-05-04 NOTE — ED NOTES
I have reviewed discharge instructions with the patient. The patient verbalized understanding. Patient left ED via Discharge Method: ambulatory to Home with wife. Opportunity for questions and clarification provided. Patient given 1 scripts. To continue your aftercare when you leave the hospital, you may receive an automated call from our care team to check in on how you are doing. This is a free service and part of our promise to provide the best care and service to meet your aftercare needs.  If you have questions, or wish to unsubscribe from this service please call [phone]. Thank you for Choosing our [facility] Emergency Department.

## 2019-05-04 NOTE — ED TRIAGE NOTES
Pt in states left arm pain since Wednesday. States he is unable to lift arm due to pain. Pt states he injured rotator cuff in 2002. States took 100mg tramadol with some relief. States oxycodone usually helps pain more for him. Denies n/v/d/sob/cp.

## 2019-06-13 PROBLEM — R06.02 SHORTNESS OF BREATH: Status: ACTIVE | Noted: 2019-03-20

## 2019-10-29 ENCOUNTER — HOSPITAL ENCOUNTER (EMERGENCY)
Age: 84
Discharge: HOME | End: 2019-10-29
Attending: EMERGENCY MEDICINE
Payer: MEDICARE

## 2019-10-29 VITALS
DIASTOLIC BLOOD PRESSURE: 76 MMHG | SYSTOLIC BLOOD PRESSURE: 126 MMHG | RESPIRATION RATE: 18 BRPM | TEMPERATURE: 97.9 F | WEIGHT: 136 LBS | BODY MASS INDEX: 21.86 KG/M2 | HEART RATE: 88 BPM | HEIGHT: 66 IN | OXYGEN SATURATION: 97 %

## 2019-10-29 DIAGNOSIS — M54.42 CHRONIC BILATERAL LOW BACK PAIN WITH BILATERAL SCIATICA: Primary | ICD-10-CM

## 2019-10-29 DIAGNOSIS — G89.29 CHRONIC BILATERAL LOW BACK PAIN WITH BILATERAL SCIATICA: Primary | ICD-10-CM

## 2019-10-29 DIAGNOSIS — M54.41 CHRONIC BILATERAL LOW BACK PAIN WITH BILATERAL SCIATICA: Primary | ICD-10-CM

## 2019-10-29 PROCEDURE — 99283 EMERGENCY DEPT VISIT LOW MDM: CPT | Performed by: EMERGENCY MEDICINE

## 2019-10-29 RX ORDER — LIDOCAINE 4 G/100G
1 PATCH TOPICAL EVERY 12 HOURS
Qty: 28 PATCH | Refills: 0 | Status: SHIPPED | OUTPATIENT
Start: 2019-10-29 | End: 2021-04-29 | Stop reason: ALTCHOICE

## 2019-10-29 RX ORDER — PREDNISONE 20 MG/1
40 TABLET ORAL DAILY
Qty: 10 TAB | Refills: 0 | Status: SHIPPED | OUTPATIENT
Start: 2019-10-29 | End: 2019-11-03

## 2019-10-29 RX ORDER — TRAMADOL HYDROCHLORIDE 50 MG/1
50 TABLET ORAL
Qty: 19 TAB | Refills: 0 | Status: SHIPPED | OUTPATIENT
Start: 2019-10-29 | End: 2019-11-03

## 2019-10-29 NOTE — DISCHARGE INSTRUCTIONS
Take medications as prescribed  Call your doctor for recheck appointment  Monitor closely for any new or worsening symptoms  Return to ER for any worsening symptoms or new problems which may arise    Do not drink alcohol or drive while taking the prescription pain medications

## 2019-10-29 NOTE — ED TRIAGE NOTES
Pt slowly ambulatory to triage with cane. Pt reports lower back pain for at least a week. Pt has a pain contract [facility]. Pt states he sees Dr. Mica Shaw. Pt states he only takes tylenol though. Pt denies any urinary s/sx. Pt states the pain sometimes radiates to his left leg. Pt states he has known arthritis.  Pt denies fall or injury

## 2019-10-29 NOTE — ED NOTES
I have reviewed discharge instructions with the patient. The patient verbalized understanding. Patient left ED via Discharge Method: ambulatory to Home with self. Opportunity for questions and clarification provided. Patient given 3 scripts. To continue your aftercare when you leave the hospital, you may receive an automated call from our care team to check in on how you are doing. This is a free service and part of our promise to provide the best care and service to meet your aftercare needs.  If you have questions, or wish to unsubscribe from this service please call [phone]. Thank you for Choosing our [facility] Emergency Department.

## 2020-02-05 ENCOUNTER — HOSPITAL ENCOUNTER (OUTPATIENT)
Dept: PHYSICAL THERAPY | Age: 85
Discharge: HOME | End: 2020-02-05
Payer: MEDICARE

## 2020-02-05 PROCEDURE — 97162 PT EVAL MOD COMPLEX 30 MIN: CPT

## 2020-02-05 PROCEDURE — 97110 THERAPEUTIC EXERCISES: CPT

## 2020-02-05 NOTE — THERAPY EVALUATION
Mamie Herrera  : 1935  [place]  Secondary: [address] [facility] [place] [id number] [facility], [address] [place] [address],[address], [facility] [address].  [phone]   [phone]           OUTPATIENT PHYSICAL THERAPY:Initial Assessment 2020   ICD-10: Treatment Diagnosis: Pain in left knee (M25.562)  Precautions/Allergies:   Antihistamines; Other medication; and Statins-hmg-coa reductase inhibitors   TREATMENT PLAN:  Effective Dates: 2020 TO 2020 (90 days). Frequency/Duration: 2 times a week for 90 Day(s) MEDICAL/REFERRING DIAGNOSIS:  Unilateral primary osteoarthritis, left knee [M17.12]  DATE OF ONSET: 18-24 months ago  REFERRING PHYSICIAN: Malachi Hayden MD MD Orders: Evaluate and Treat  Return MD Appointment: No follow up appt at this time     INITIAL ASSESSMENT:  Mr. Vicki Gordon presents with decreased L knee ROM, decreased L knee strength and increased pain leading to decreased functional status. Pt would benefit from skilled physical therapy services to address the above deficits and help patient return to prior level of function. PROBLEM LIST (Impacting functional limitations):  1. Decreased Strength  2. Decreased ADL/Functional Activities  3. Increased Pain  4. Decreased Flexibility/Joint Mobility  5. Decreased [place] with Home Exercise Program INTERVENTIONS PLANNED: (Treatment may consist of any combination of the following)  1. Cold  2. Cryotherapy  3. Electrical Stimulation  4. Heat  5. Home Exercise Program (HEP)  6. Manual Therapy  7. Range of Motion (ROM)  8. Therapeutic Exercise/Strengthening  9. Ultrasound (US)   10. Aquatic Therapy     GOALS: (Goals have been discussed and agreed upon with patient.)  Short-Term Functional Goals: Time Frame: 4 weeks  1. Pt will increase ROM L knee 0-125 degrees to assist with household ADL's  2. Pt will increase strength L knee 4+/5 to assist with household ADL's  3.  Pt will be independent with HEP  Discharge Goals: Time Frame: 12 weeks  1. Pt will increase strength L knee 5/5 to assist with household ADL's  2. Pt will ascend/descend 10 eight-inch steps independently with min to no c/o L knee pain  3. Pt will perform 20 minutes household cleaning activities independently with min to no c/o L knee pain    OUTCOME MEASURE:   Tool Used: Modified Oswestry Low Back Pain Questionnaire  Score:  Initial: 49/50  Most Recent: X/50 (Date: -- )   Interpretation of Score: Each section is scored on a 0-5 scale, 5 representing the greatest disability. The scores of each section are added together for a total score of 50. MEDICAL NECESSITY:   · Patient is expected to demonstrate progress in strength, range of motion and functional technique to increase independence with household ADL's. · Patient demonstrates good rehab potential due to higher previous functional level. REASON FOR SERVICES/OTHER COMMENTS:  · Patient continues to require skilled intervention due to decreased ROM/strength L knee with increased pain leading to decreased functional status. Total Duration:  PT Patient Time In/Time Out  Time In: 0930  Time Out: 1015    Rehabilitation Potential For Stated Goals: Good  Regarding Kvng Cho's therapy, I certify that the treatment plan above will be carried out by a therapist or under their direction. Thank you for this referral,  Saurabh Aguila, PT     Referring Physician Signature: Kusum Diaz MD _______________________________ Date _____________     PAIN/SUBJECTIVE:   Initial:   2/10 Post Session:  2/10   HISTORY:   History of Injury/Illness (Reason for Referral):  Pt reports insidious onset L knee pain of  18-24 months duration. He states MD does not want to do a knee replacement at this time. He had a cortisone injection in his L knee with no relief. He c/o aching in his L knee and shin bone. Pt is taking Tylenol twice daily which helps some.   He rates his current L knee pain 2/10 sitting at rest, increasing to 9/10 at times. Aggravating factors include ascending/descending stairs and lying down at night. Pt lives with wife in a two-story house. He has difficulties with household cleaning activities due to his L knee pain. Pt has history of a lumbar fusion. Past Medical History/Comorbidities:   Mr. Gomez Nava  has a past medical history of Adhesive capsulitis of shoulder, Anemia, unspecified, Anxiety state, unspecified, AR (allergic rhinitis), Arthritis, CAD (coronary artery disease) (1995), Cancer ([address] (2002), Cerebrovascular disease, unspecified (5/27/2015), Cerumen impaction, Cervicalgia, Chronic ethmoidal sinusitis, Chronic frontal sinusitis, Chronic ischemic heart disease, unspecified, Chronic lymphadenitis, Chronic maxillary sinusitis, Chronic pain, Coronary atherosclerosis of native coronary artery (5/27/2015), Degeneration of lumbar or lumbosacral intervertebral disc, Deviated septum, ED (erectile dysfunction), Fatigue, GERD (gastroesophageal reflux disease), H/O prostate cancer (2002), H/O seasonal allergies, High frequency hearing loss, HLD (hyperlipidemia) (6/4/2014), Hypercholesteremia, Hypertension, Hypertrophy of nasal turbinates, Loss of weight (8/13/2013), Malignant neoplasm of prostate ([address] (8/13/2013), MI (myocardial infarction) ([address] (1995), Nasal obstruction, Neuralgia, neuritis, and radiculitis, unspecified, Night sweat (8/19/2013), OA (osteoarthritis), Obstructive sleep apnea (10/23/2013), Organic hypersomnia, 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 He also has no past medical history of Difficult intubation, Malignant hyperthermia due to anesthesia, Nausea & vomiting, or Pseudocholinesterase deficiency.   Mr. Gomez Nava  has a past surgical history that includes hx ptca (1995); hx hernia repair (Right, 2007); hx orthopaedic; hx lumbar laminectomy (3/2013); hx back surgery; hx other surgical (2016); hx cataract removal (Bilateral); hx tonsillectomy (childhood); pr sinus surgery proc unlisted (5/5/2016); hx heent; hx heent; and pr cardiac surg procedure unlist (04/26/2018). Social History/Living Environment:     Pt lives with wife in a two-story house  Prior Level of Function/Work/Activity:  Pt is retired  Dominant Side:         RIGHT  Other Clinical Tests:          X-rays L knee revealed arthritis   Personal Factors:          Sex:  male        Age:  81 y.o. Profession:  Pt si retired   Ambulatory/Rehab [address] [place] [address] Risk Assessment   Risk Factors:       (1)  Gender [Male] Ability to Rise from Chair:       (1)  Pushes up, successful in one attempt   [address]:       No modifications necessary   Total: (5 or greater = High Risk): 2   ©2010 [facility]. [facility] Patent #[id number]. Federal Law prohibits the replication, distribution or use without written permission from [facility] [employer]   Current Medications:       Current Outpatient Medications:     ezetimibe (ZETIA) 10 mg tablet, TAKE 1 TABLET DAILY, Disp: 90 Tab, Rfl: 4    doxazosin (CARDURA) 2 mg tablet, Take 1 Tab by mouth daily. , Disp: 30 Tab, Rfl: 0    lidocaine 4 % patch, 1 Patch by TransDERmal route every twelve (12) hours every twelve (12) hours. , Disp: 28 Patch, Rfl: 0    pantoprazole (PROTONIX) 40 mg tablet, Take bid po can d/c daily dose, Disp: 180 Tab, Rfl: 3    REPATHA PUSHTRONEX 420 mg/3.5 mL Injt, every thirty (30) days. , Disp: , Rfl:     albuterol (PROVENTIL HFA, VENTOLIN HFA, PROAIR HFA) 90 mcg/actuation inhaler, Take 2 Puffs by inhalation every four (4) hours as needed for Wheezing., Disp: 1 Inhaler, Rfl: 1    turmeric root extract 500 mg cap, Take 500 mg by mouth two (2) times a day., Disp: , Rfl:     cyanocobalamin (VITAMIN B-12) 1,000 mcg tablet, Take 1,000 mcg by mouth daily. , Disp: , Rfl:     omega 3-dha-epa-fish oil (FISH OIL) 100-160-1,000 mg cap, Take  by mouth daily. , Disp: , Rfl:     vitamin e (E GEMS) 100 unit capsule, Take  by mouth daily. , Disp: , Rfl:     ASPIRIN 81 mg Tab, Take 81 mg by mouth nightly., Disp: , Rfl:    Date Last Reviewed:  02-05-20   Number of Personal Factors/Comorbidities that affect the Plan of Care: 1-2: MODERATE COMPLEXITY   EXAMINATION:   Observation/Orthostatic Postural Assessment: Forward head, rounded shoulders  Palpation:          Generalized tenderness throughout L knee  ROM:    R knee extension = 0 degrees  R knee flexion = 125 degrees    L knee extension = -5 degrees  L knee flexion = 120 degrees      Strength:    R knee extension = 5/5  R knee flexion = 5/5    L knee extension = 4/5  L knee flexion = 4/5      Special Tests:          N/A  Neurological Screen:        Sensation:  Intact to light touch bilateral LE's  Functional Mobility:         Gait/Ambulation:  Pt walks with moderate antalgic gait        Transfers:  Pt able to transition sit to supine and supine to sit independently    Body Structures Involved:  1. Bones  2. Joints  3. Muscles Body Functions Affected:  1. Sensory/Pain  2. Neuromusculoskeletal Activities and Participation Affected:  1. Mobility  2.  Domestic Life   Number of elements (examined above) that affect the Plan of Care: 3: MODERATE COMPLEXITY   CLINICAL PRESENTATION:   Presentation: Evolving clinical presentation with changing clinical characteristics: MODERATE COMPLEXITY   CLINICAL DECISION MAKING:   Use of outcome tool(s) and clinical judgement create a POC that gives a: Questionable prediction of patient's progress: MODERATE COMPLEXITY

## 2020-02-05 NOTE — PROGRESS NOTES
Riccardo Nguyen  : 1935  [place]  Secondary: [address] [facility]  [address] [facility], [address] [place] [address],[address], [facility] [address].  [phone]   [phone]         OUTPATIENT PHYSICAL THERAPY: Daily Treatment Note 2020  Visit Count:  1    ICD-10: Treatment Diagnosis: Pain in left knee (M25.562)  Precautions/Allergies:   Antihistamines; Other medication; and Statins-hmg-coa reductase inhibitors   TREATMENT PLAN:  Effective Dates: 2020 TO 2020 (90 days). Frequency/Duration: 2 times a week for 90 Day(s)    Pre-treatment Symptoms/Complaints:  L Knee Pain  Pain: Initial:   2/10 Post Session:  2/10   Medications Last Reviewed:  2020  Updated Objective Findings:  See evaluation note from today  TREATMENT:     THERAPEUTIC EXERCISE: (15 minutes):  Exercises per grid below to improve mobility and strength. Required minimal verbal cues to promote proper body alignment and promote proper body posture. Progressed resistance and repetitions as indicated. Date:  20 Date:   Date:     Activity/Exercise Parameters Parameters Parameters   Quad Sets 15 reps  5 sec holds     SLR Flexion 15 reps  3 sec holds     SAQ's 15 reps  5 sec holds     Heel Slides 15 reps  5 sec holds     LAQ's 15 reps  5 sec holds                     Med[employer] Portal  Treatment/Session Summary:    · Response to Treatment:  Pt toelrated all treatments well today with no c/o increased pain. Pt states he is going to call to schedule further appts. 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 · Communication/Consultation:  None today  · Equipment provided today:  None today  · Recommendations/Intent for next treatment session: Next visit will focus on progression of ROM/strengthening L knee as tolerable.     Total Treatment Billable Duration:  15 minutes  PT Patient Time In/Time Out  Time In: 930  Time Out: [address] [facility] Mera Chappell    Future Appointments   Date Time Provider Clarita Adam   6/11/2020 10:00 AM Nishant Scott MD [facility] ARTURO

## 2020-02-13 ENCOUNTER — HOSPITAL ENCOUNTER (OUTPATIENT)
Dept: PHYSICAL THERAPY | Age: 85
Discharge: HOME | End: 2020-02-13
Payer: MEDICARE

## 2020-02-13 PROCEDURE — 97110 THERAPEUTIC EXERCISES: CPT

## 2020-02-13 NOTE — PROGRESS NOTES
Anirudh Aquino  : 1935  [place]  Secondary: 310 [facility] at [address] [facility]  [address] [facility], [address] [place] [address],[address], [facility] [address].  [phone]   [phone]         OUTPATIENT PHYSICAL THERAPY: Daily Treatment Note 2020  Visit Count:  2    ICD-10: Treatment Diagnosis: Pain in left knee (M25.562)  Precautions/Allergies:   Antihistamines; Other medication; and Statins-hmg-coa reductase inhibitors   TREATMENT PLAN:  Effective Dates: 2020 TO 2020 (90 days). Frequency/Duration: 2 times a week for 90 Day(s)    Pre-treatment Symptoms/Complaints:  L Knee Pain; Pt states his L knee is sore from the rainy weather, but overall is feeling better. Pain: Initial:   2/10 Post Session:  2/10   Medications Last Reviewed:  2020  Updated Objective Findings:  None Today  TREATMENT:     THERAPEUTIC EXERCISE: (40 minutes):  Exercises per grid below to improve mobility and strength. Required minimal verbal cues to promote proper body alignment and promote proper body posture. Progressed resistance and repetitions as indicated. MODALITIES: (10 minutes): Moist heat to L knee x10 minutes to decrease pain/soreness.   Skin clear afterwards      Date:  20 Date:  20 Date:     Activity/Exercise Parameters Parameters Parameters   Quad Sets 15 reps  5 sec holds 20 reps  5 sec holds    SLR Flexion 15 reps  3 sec holds 20 reps  3 sec holds    SAQ's 15 reps  5 sec holds 1 Lb  20 reps    Heel Slides 15 reps  5 sec holds 20 reps  5 sec holds    LAQ's 15 reps  5 sec holds 1 Lb  20 reps    NuStep  Level 3  6 minutes    Standing Heel/Toe Raises  20 reps each    Step-Ups  4 inch step  20 reps    TKE with T-band  Black T-band  20 reps    Gastroc Slantboard  3 reps  20 sec holds    Bridging  10 reps  5 sec holds        [employer] Portal  Treatment/Session Summary:    · Response to Treatment:  Pt toelrated all treatments well today with no c/o increased pain. Pt did well with all new exercises. · Communication/Consultation:  None today  · Equipment provided today:  None today  · Recommendations/Intent for next treatment session: Next visit will focus on progression of ROM/strengthening L knee as tolerable.     Total Treatment Billable Duration:  40 minutes  PT Patient Time In/Time Out  Time In: 1300  Time Out: 1350  Sofia Aragon PT    Future Appointments   Date Time Provider Clarita Adam   6/11/2020 10:00 AM Toni Scott MD [facility]

## 2020-02-17 ENCOUNTER — HOSPITAL ENCOUNTER (OUTPATIENT)
Dept: PHYSICAL THERAPY | Age: 85
Discharge: HOME | End: 2020-02-17
Payer: MEDICARE

## 2020-02-17 PROCEDURE — 97110 THERAPEUTIC EXERCISES: CPT

## 2020-02-17 NOTE — PROGRESS NOTES
Sam Ojeda  : 1935  [place]  Secondary: [address] [facility]  [address], [address], [place] [address]  [phone]   [phone]         OUTPATIENT PHYSICAL THERAPY: Daily Treatment Note 2020  Visit Count:  3    ICD-10: Treatment Diagnosis: Pain in left knee (M25.562)  Precautions/Allergies:   Antihistamines; Other medication; and Statins-hmg-coa reductase inhibitors   TREATMENT PLAN:  Effective Dates: 2020 TO 2020 (90 days). Frequency/Duration: 2 times a week for 90 Day(s)    Pre-treatment Symptoms/Complaints:  L Knee Pain; Pt states his L knee is feeling good today (pain 2/10). He states the knee felt great for 3 days following his last session. Pain: Initial:   2/10 Post Session:  2/10   Medications Last Reviewed:  2020  Updated Objective Findings:  None Today  TREATMENT:     THERAPEUTIC EXERCISE: (40 minutes):  Exercises per grid below to improve mobility and strength. Required minimal verbal cues to promote proper body alignment and promote proper body posture. Progressed resistance and repetitions as indicated. MODALITIES: (0 minutes): Moist heat to L knee x10 minutes to decrease pain/soreness.   Skin clear afterwards      Date:  20 Date:  20 Date:  20   Activity/Exercise Parameters Parameters Parameters   Quad Sets 15 reps  5 sec holds 20 reps  5 sec holds    SLR Flexion 15 reps  3 sec holds 20 reps  3 sec holds 20 reps  3 sec holds   SAQ's 15 reps  5 sec holds 1 Lb  20 reps 2 Lbs  20 reps   Heel Slides 15 reps  5 sec holds 20 reps  5 sec holds 20 reps  5 sec holds   LAQ's 15 reps  5 sec holds 1 Lb  20 reps 2 Lbs  20 reps   NuStep  Level 3  6 minutes Level 3  6 minutes   Standing Heel/Toe Raises  20 reps each 20 reps each   Step-Ups  4 inch step  20 reps 6 inch step  20 reps   TKE with T-band  Black T-band  20 reps Black T-band  20 reps   Black & Alex 3 reps  20 sec holds 3 reps  20 sec holds   Bridging  10 reps  5 sec holds 15 reps  5 sec holds   Nautilus Leg Press   35 Lbs  20 reps       [employer] Portal  Treatment/Session Summary:    · Response to Treatment:  Pt toelrated all treatments well today with no c/o increased pain. Strength improving L knee. · Communication/Consultation:  None today  · Equipment provided today:  None today  · Recommendations/Intent for next treatment session: Next visit will focus on progression of ROM/strengthening L knee as tolerable.     Total Treatment Billable Duration:  40 minutes  PT Patient Time In/Time Out  Time In: 1430  Time Out: 1510  Wendy Olivia, PT    Future Appointments   Date Time Provider Clarita Adam   2/20/2020  1:00 PM [facility]   6/11/2020 10:00 AM Shruthi Scott MD [facility]

## 2020-02-20 ENCOUNTER — HOSPITAL ENCOUNTER (OUTPATIENT)
Dept: PHYSICAL THERAPY | Age: 85
Discharge: HOME | End: 2020-02-20
Payer: MEDICARE

## 2020-02-20 PROCEDURE — 97110 THERAPEUTIC EXERCISES: CPT

## 2020-02-20 NOTE — PROGRESS NOTES
Sunita Dudley  : 1935  [place]  Secondary: [address] [facility]  [address] [facility], [address] [place] [address] 83,[address], [place] [address]  [phone]   [phone]         OUTPATIENT PHYSICAL THERAPY: Daily Treatment Note 2020  Visit Count:  4    ICD-10: Treatment Diagnosis: Pain in left knee (M25.562)  Precautions/Allergies:   Antihistamines; Other medication; and Statins-hmg-coa reductase inhibitors   TREATMENT PLAN:  Effective Dates: 2020 TO 2020 (90 days). Frequency/Duration: 2 times a week for 90 Day(s)    Pre-treatment Symptoms/Complaints:  L Knee Pain; Pt states his L knee is feeling pretty good. He reports some soreness with the cold, rainy weather today. \"I can really tell this therapy is helping. \"  Pain: Initial:   2/10 Post Session:  1/10   Medications Last Reviewed:  2020  Updated Objective Findings:  None Today  TREATMENT:     THERAPEUTIC EXERCISE: (40 minutes):  Exercises per grid below to improve mobility and strength. Required minimal verbal cues to promote proper body alignment and promote proper body posture. Progressed resistance and repetitions as indicated. MODALITIES: (10 minutes): Moist heat to L knee x10 minutes to decrease pain/soreness.   Skin clear afterwards      Date:  20 Date:  20 Date:  20 Date:  20   Activity/Exercise Parameters Parameters Parameters    Quad Sets 15 reps  5 sec holds 20 reps  5 sec holds  20 reps  5 sec holds   SLR Flexion 15 reps  3 sec holds 20 reps  3 sec holds 20 reps  3 sec holds 20 reps  3 sec holds   SAQ's 15 reps  5 sec holds 1 Lb  20 reps 2 Lbs  20 reps 2 Lbs  20 reps   Heel Slides 15 reps  5 sec holds 20 reps  5 sec holds 20 reps  5 sec holds 20 reps  5 sec holds   LAQ's 15 reps  5 sec holds 1 Lb  20 reps 2 Lbs  20 reps 2 Lbs  20 reps   NuStep  Level 3  6 minutes Level 3  6 minutes Level 3  6 minutes   Standing Heel/Toe Raises  20 reps each 20 reps each 20 reps each   Step-Ups  4 inch step  20 reps 6 inch step  20 reps 6 inch step  20 reps   TKE with T-band  Black T-band  20 reps Black T-band  20 reps Black T-band  20 reps   Gastroc Slantboard  3 reps  20 sec holds 3 reps  20 sec holds 3 reps  20 sec holds   Bridging  10 reps  5 sec holds 15 reps  5 sec holds 15 reps  5 sec holds   Nautilus Leg Press   35 Lbs  20 reps 35 Lbs  20 reps       [employer] Portal  Treatment/Session Summary:    · Response to Treatment:  Pt toelrated all treatments well today with no c/o increased pain. Decreased pain following treatments today. · Communication/Consultation:  None today  · Equipment provided today:  None today  · Recommendations/Intent for next treatment session: Next visit will focus on progression of ROM/strengthening L knee as tolerable.     Total Treatment Billable Duration:  40 minutes  PT Patient Time In/Time Out  Time In: 1300  Time Out: 1350  Helga Adamson PT    Future Appointments   Date Time Provider Clarita Adam   6/11/2020 10:00 AM Mona Scott MD [facility]

## 2020-02-25 ENCOUNTER — HOSPITAL ENCOUNTER (OUTPATIENT)
Dept: PHYSICAL THERAPY | Age: 85
Discharge: HOME | End: 2020-02-25
Payer: MEDICARE

## 2020-02-25 PROCEDURE — 97110 THERAPEUTIC EXERCISES: CPT

## 2020-02-25 NOTE — PROGRESS NOTES
Chris Lees  : 1935  Primary:   Secondary:  [facility] [place] [address],[address], [place] [address].  [phone]   [phone]         OUTPATIENT PHYSICAL THERAPY: Daily Treatment Note 2020  Visit Count:  5    ICD-10: Treatment Diagnosis: Pain in left knee (M25.562)  Precautions/Allergies:   Antihistamines; Other medication; and Statins-hmg-coa reductase inhibitors   TREATMENT PLAN:  Effective Dates: 2020 TO 2020 (90 days). Frequency/Duration: 2 times a week for 90 Day(s)    Pre-treatment Symptoms/Complaints:  L Knee Pain; Pt states his knee is a little sore today. Pain: Initial:   2/10 Post Session:  1/10   Medications Last Reviewed:  2020  Updated Objective Findings:  None Today  TREATMENT:     THERAPEUTIC EXERCISE: (40 minutes):  Exercises per grid below to improve mobility and strength. Required minimal verbal cues to promote proper body alignment and promote proper body posture. Progressed resistance and repetitions as indicated. MODALITIES: (10 minutes): Moist heat to L knee x10 minutes to decrease pain/soreness.   Skin clear afterwards      Date:  20 Date:  20 Date:  20 Date:  20 Date:  20   Activity/Exercise Parameters Parameters Parameters     Quad Sets 15 reps  5 sec holds 20 reps  5 sec holds  20 reps  5 sec holds 20 reps  5 sec holds   SLR Flexion 15 reps  3 sec holds 20 reps  3 sec holds 20 reps  3 sec holds 20 reps  3 sec holds 20 reps  3 sec holds   SAQ's 15 reps  5 sec holds 1 Lb  20 reps 2 Lbs  20 reps 2 Lbs  20 reps 3 Lbs  20 reps   Heel Slides 15 reps  5 sec holds 20 reps  5 sec holds 20 reps  5 sec holds 20 reps  5 sec holds 20 reps  5 sec holds   LAQ's 15 reps  5 sec holds 1 Lb  20 reps 2 Lbs  20 reps 2 Lbs  20 reps 3 Lbs  20 reps   NuStep  Level 3  6 minutes Level 3  6 minutes Level 3  6 minutes Level 4  7 minutes   Standing Heel/Toe Raises  20 reps each 20 reps each 20 reps each 20 reps each   Step-Ups  4 inch step  20 reps 6 inch step  20 reps 6 inch step  20 reps 6 inch step  20 reps   TKE with T-band  Black T-band  20 reps Black T-band  20 reps Black T-band  20 reps Black T-band  20 reps   Gastroc Slantboard  3 reps  20 sec holds 3 reps  20 sec holds 3 reps  20 sec holds 3 reps  20 sec holds   Bridging  10 reps  5 sec holds 15 reps  5 sec holds 15 reps  5 sec holds 15 reps  5 sec holds   Nautilus Leg Press   35 Lbs  20 reps 35 Lbs  20 reps 35 Lbs  20 reps       [employer] Portal  Treatment/Session Summary:    · Response to Treatment:  Pt toelrated all treatments well today with no c/o increased pain. Strength improving L knee. · Communication/Consultation:  None today  · Equipment provided today:  None today  · Recommendations/Intent for next treatment session: Next visit will focus on progression of ROM/strengthening L knee as tolerable.     Total Treatment Billable Duration:  40 minutes  PT Patient Time In/Time Out  Time In: 7469  Time Out: Cade Deng PT    Future Appointments   Date Time Provider Clarita Adam   2/27/2020  1:00 PM Pauline Hilton [facility]   6/11/2020 10:00 AM Trish Scott MD [facility]

## 2020-02-27 ENCOUNTER — HOSPITAL ENCOUNTER (OUTPATIENT)
Dept: PHYSICAL THERAPY | Age: 85
Discharge: HOME | End: 2020-02-27
Payer: MEDICARE

## 2020-02-27 PROCEDURE — 97110 THERAPEUTIC EXERCISES: CPT

## 2020-02-27 NOTE — PROGRESS NOTES
Riccardo Nguyen  : 1935  Primary:   Secondary:  Therapy Center at [address] [place] [address] [address], [address] [facility]  [phone]   [phone]         OUTPATIENT PHYSICAL THERAPY: Daily Treatment Note 2020  Visit Count:  6    ICD-10: Treatment Diagnosis: Pain in left knee (M25.562)  Precautions/Allergies:   Antihistamines; Other medication; and Statins-hmg-coa reductase inhibitors   TREATMENT PLAN:  Effective Dates: 2020 TO 2020 (90 days). Frequency/Duration: 2 times a week for 90 Day(s)    Pre-treatment Symptoms/Complaints:  L Knee Pain; Pt states his knee is doing better. Pain: Initial:   2/10 Post Session:  1/10   Medications Last Reviewed:  2020  Updated Objective Findings:  None Today  TREATMENT:     THERAPEUTIC EXERCISE: (45 minutes):  Exercises per grid below to improve mobility and strength. Required minimal verbal cues to promote proper body alignment and promote proper body posture. Progressed resistance and repetitions as indicated. MODALITIES: (10 minutes): Moist heat to L knee x10 minutes to decrease pain/soreness.   Skin clear afterwards      Date:  20 Date:  20   Activity/Exercise     Quad Sets 20 reps  5 sec holds 20 reps  5 sec holds   SLR Flexion 20 reps  3 sec holds 20 reps  3 sec holds   SAQ's 3 Lbs  20 reps 3 Lbs  20 reps   Heel Slides 20 reps  5 sec holds 20 reps  5 sec holds   LAQ's 3 Lbs  20 reps 3 Lbs  20 reps   NuStep Level 4  7 minutes Level 4  7 minutes   Standing Heel/Toe Raises 20 reps each 20 reps each   Step-Ups 6 inch step  20 reps 6 inch step  20 reps   TKE with T-band Black T-band  20 reps Black T-band  20 reps   Gastroc Slantboard 3 reps  20 sec holds 3 reps  20 sec holds   Bridging 15 reps  5 sec holds 15 reps  5 sec holds   Nautilus Leg Press 35 Lbs  20 reps 35 Lbs  20 reps       MedBridge Portal  Treatment/Session Summary:    · Response to Treatment:  Pt toelrated all treatments well today with no c/o increased pain. Pain continues to improve L knee. · Communication/Consultation:  None today  · Equipment provided today:  None today  · Recommendations/Intent for next treatment session: Next visit will focus on progression of ROM/strengthening L knee as tolerable.     Total Treatment Billable Duration:  45 minutes  PT Patient Time In/Time Out  Time In: 1300  Time Out: 1355  Chilo Jones PT    Future Appointments   Date Time Provider Clarita Butcheri   6/11/2020 10:00 AM Sophia Scott MD [facility]

## 2020-03-02 ENCOUNTER — HOSPITAL ENCOUNTER (OUTPATIENT)
Dept: PHYSICAL THERAPY | Age: 85
Discharge: HOME | End: 2020-03-02
Payer: MEDICARE

## 2020-03-02 PROCEDURE — 97110 THERAPEUTIC EXERCISES: CPT

## 2020-03-02 NOTE — PROGRESS NOTES
Ever Gear  : 1935  Primary:   Secondary:  Therapy Center at [place] [address], [place] [address].  [phone]   [phone]         OUTPATIENT PHYSICAL THERAPY: Daily Treatment Note 3/2/2020  Visit Count:  7    ICD-10: Treatment Diagnosis: Pain in left knee (M25.562)  Precautions/Allergies:   Antihistamines; Other medication; and Statins-hmg-coa reductase inhibitors   TREATMENT PLAN:  Effective Dates: 2020 TO 2020 (90 days). Frequency/Duration: 2 times a week for 90 Day(s)    Pre-treatment Symptoms/Complaints:  L Knee Pain; Pt states his L knee is feeling good today. Pain: Initial:   2/10 Post Session:  1/10   Medications Last Reviewed:  3/2/2020  Updated Objective Findings:  None Today  TREATMENT:     THERAPEUTIC EXERCISE: (45 minutes):  Exercises per grid below to improve mobility and strength. Required minimal verbal cues to promote proper body alignment and promote proper body posture. Progressed resistance and repetitions as indicated. MODALITIES: (10 minutes): Moist heat to L knee x10 minutes to decrease pain/soreness.   Skin clear afterwards      Date:  20 Date:  20 Date:  20   Activity/Exercise      Quad Sets 20 reps  5 sec holds 20 reps  5 sec holds 20 reps  5 sec holds   SLR Flexion 20 reps  3 sec holds 20 reps  3 sec holds 20 reps  3 sec holds   SAQ's 3 Lbs  20 reps 3 Lbs  20 reps 4 Lbs  20 reps   Heel Slides 20 reps  5 sec holds 20 reps  5 sec holds 20 reps  5 sec holds   LAQ's 3 Lbs  20 reps 3 Lbs  20 reps 4 Lbs  20 reps   NuStep Level 4  7 minutes Level 4  7 minutes Level 4  7 minutes   Standing Heel/Toe Raises 20 reps each 20 reps each 20 reps each   Step-Ups 6 inch step  20 reps 6 inch step  20 reps 6 inch step  20 reps   TKE with T-band Black T-band  20 reps Black T-band  20 reps Black T-band  20 reps   Gastroc Slantboard 3 reps  20 sec holds 3 reps  20 sec holds 3 reps  20 sec holds   Bridging 15 reps  5 sec holds 15 reps  5 sec holds 15 reps  5 sec holds   Nautilus Leg Press 35 Lbs  20 reps 35 Lbs  20 reps 40 Lbs  20 reps       MedBridge Portal  Treatment/Session Summary:    · Response to Treatment:  Pt toelrated all treatments well today with no c/o increased pain. Strength and pain continue to improve L knee. · Communication/Consultation:  None today  · Equipment provided today:  None today  · Recommendations/Intent for next treatment session: Next visit will focus on progression of ROM/strengthening L knee as tolerable.     Total Treatment Billable Duration:  45 minutes  PT Patient Time In/Time Out  Time In: 1005  Time Out: 1100  Moses Dillon PT    Future Appointments   Date Time Provider Clarita Adam   3/4/2020  1:00 PM Nasrin Aquino PT [facility]   6/11/2020 10:00 AM Peggy Scott MD [facility]

## 2020-03-04 ENCOUNTER — HOSPITAL ENCOUNTER (OUTPATIENT)
Dept: PHYSICAL THERAPY | Age: 85
Discharge: HOME | End: 2020-03-04
Payer: MEDICARE

## 2020-03-04 PROCEDURE — 97110 THERAPEUTIC EXERCISES: CPT

## 2020-03-04 NOTE — PROGRESS NOTES
Terri Lynch  : 1935  Primary:   Secondary:  Therapy Center at [address], [address] [facility]  [phone]   [phone]         OUTPATIENT PHYSICAL THERAPY: Daily Treatment Note 3/4/2020  Visit Count:  8    ICD-10: Treatment Diagnosis: Pain in left knee (M25.562)  Precautions/Allergies:   Antihistamines; Other medication; and Statins-hmg-coa reductase inhibitors   TREATMENT PLAN:  Effective Dates: 2020 TO 2020 (90 days). Frequency/Duration: 2 times a week for 90 Day(s)    Pre-treatment Symptoms/Complaints:  L Knee Pain; Pt states his L knee continues to improve. Pain: Initial:   2/10 Post Session:  1/10   Medications Last Reviewed:  3/4/2020  Updated Objective Findings:  None Today  TREATMENT:     THERAPEUTIC EXERCISE: (45 minutes):  Exercises per grid below to improve mobility and strength. Required minimal verbal cues to promote proper body alignment and promote proper body posture. Progressed resistance and repetitions as indicated. MODALITIES: (10 minutes): Moist heat to L knee x10 minutes to decrease pain/soreness.   Skin clear afterwards      Date:  20 Date:  20 Date:  20 Date:  20   Activity/Exercise       Quad Sets 20 reps  5 sec holds 20 reps  5 sec holds 20 reps  5 sec holds 20 reps  5 sec holds   SLR Flexion 20 reps  3 sec holds 20 reps  3 sec holds 20 reps  3 sec holds 20 reps  3 sec holds   SAQ's 3 Lbs  20 reps 3 Lbs  20 reps 4 Lbs  20 reps 4 Lbs  20 reps   Heel Slides 20 reps  5 sec holds 20 reps  5 sec holds 20 reps  5 sec holds 20 reps  5 sec holds   LAQ's 3 Lbs  20 reps 3 Lbs  20 reps 4 Lbs  20 reps 4 Lbs  20 reps   NuStep Level 4  7 minutes Level 4  7 minutes Level 4  7 minutes Level 4  7 minutes   Standing Heel/Toe Raises 20 reps each 20 reps each 20 reps each 20 reps each    Step-Ups 6 inch step  20 reps 6 inch step  20 reps 6 inch step  20 reps 6 inch step  20 reps   TKE with T-band Black T-band  20 reps Black T-band  20 reps Black T-band  20 reps Black T-band  20 reps   Gastroc Slantboard 3 reps  20 sec holds 3 reps  20 sec holds 3 reps  20 sec holds 3 reps  20 sec holds   Bridging 15 reps  5 sec holds 15 reps  5 sec holds 15 reps  5 sec holds 15 reps  5 sec holds   Nautilus Leg Press 35 Lbs  20 reps 35 Lbs  20 reps 40 Lbs  20 reps 40 Lbs  20 reps       MedBridge Portal  Treatment/Session Summary:    · Response to Treatment:  Pt tolerated all treatments well today with no c/o increased pain. · Communication/Consultation:  None today  · Equipment provided today:  None today  · Recommendations/Intent for next treatment session: Next visit will focus on progression of ROM/strengthening L knee as tolerable.     Total Treatment Billable Duration:  45 minutes  PT Patient Time In/Time Out  Time In: 1300  Time Out: 3895  Moses Dillon PT    Future Appointments   Date Time Provider Clarita Kia   6/11/2020 10:00 AM Peggy Scott MD [facility]

## 2020-03-10 ENCOUNTER — HOSPITAL ENCOUNTER (OUTPATIENT)
Dept: PHYSICAL THERAPY | Age: 85
Discharge: HOME | End: 2020-03-10
Payer: MEDICARE

## 2020-03-10 PROCEDURE — 97110 THERAPEUTIC EXERCISES: CPT

## 2020-03-10 NOTE — PROGRESS NOTES
Arabella Moreira  : 1935  Primary:   Secondary:  Therapy Center at [place] [address], [place] [address].  [phone]   [phone]         OUTPATIENT PHYSICAL THERAPY: Daily Treatment Note 3/10/2020  Visit Count:  9    ICD-10: Treatment Diagnosis: Pain in left knee (M25.562)  Precautions/Allergies:   Antihistamines; Other medication; and Statins-hmg-coa reductase inhibitors   TREATMENT PLAN:  Effective Dates: 2020 TO 2020 (90 days). Frequency/Duration: 2 times a week for 90 Day(s)    Pre-treatment Symptoms/Complaints:  L Knee Pain; Pt states his L knee continues to improve. Pain: Initial:   2/10 Post Session:  1/10   Medications Last Reviewed:  3/10/2020  Updated Objective Findings:  None Today  TREATMENT:     THERAPEUTIC EXERCISE: (45 minutes):  Exercises per grid below to improve mobility and strength. Required minimal verbal cues to promote proper body alignment and promote proper body posture. Progressed resistance and repetitions as indicated. MODALITIES: (10 minutes): Moist heat to L knee x10 minutes to decrease pain/soreness.   Skin clear afterwards      Date:  20 Date:  20 Date:  03-10-20   Activity/Exercise      Quad Sets 20 reps  5 sec holds 20 reps  5 sec holds 20 reps  5 sec holds   SLR Flexion 20 reps  3 sec holds 20 reps  3 sec holds 20 reps  3 sec holds   SAQ's 4 Lbs  20 reps 4 Lbs  20 reps 5 Lbs  20 reps   Heel Slides 20 reps  5 sec holds 20 reps  5 sec holds 20 reps  5 sec holds   LAQ's 4 Lbs  20 reps 4 Lbs  20 reps 5 Lbs  20 reps   NuStep Level 4  7 minutes Level 4  7 minutes Level 4  7 minutes   Standing Heel/Toe Raises 20 reps each 20 reps each  20 reps each   Step-Ups 6 inch step  20 reps 6 inch step  20 reps 8 inch step  20 reps   TKE with T-band Black T-band  20 reps Black T-band  20 reps Black T-band  20 reps   Gastroc Slantboard 3 reps  20 sec holds 3 reps  20 sec holds 3 reps  20 sec holds   Bridging 15 reps  5 sec holds 15 reps  5 sec holds 15 reps  5 sec holds   Nautilus Leg Press 40 Lbs  20 reps 40 Lbs  20 reps 40 Lbs  20 reps       MedBridge Portal  Treatment/Session Summary:    · Response to Treatment:  Pt tolerated all treatments well today with no c/o increased pain. Strength improving L knee. · Communication/Consultation:  None today  · Equipment provided today:  None today  · Recommendations/Intent for next treatment session: Next visit will focus on progression of ROM/strengthening L knee as tolerable.     Total Treatment Billable Duration:  45 minutes  PT Patient Time In/Time Out  Time In: 6240  Time Out: [address], PT    Future Appointments   Date Time Provider Clarita Kia   3/11/2020 11:00 AM Tre Kinsey PT [facility]   6/11/2020 10:00 AM Sandy Partida MD [facility]

## 2020-03-10 NOTE — PROGRESS NOTES
Riri Kaplan  : 1935  [place]  Secondary: [address] [facility]  [address] [facility], [address] [place] [address],[address] [facility], [place] [address].  [phone]   [phone]           OUTPATIENT PHYSICAL THERAPY:Progress Report 3/10/2020   ICD-10: Treatment Diagnosis: Pain in left knee (M25.562)  Precautions/Allergies:   Antihistamines; Other medication; and Statins-hmg-coa reductase inhibitors   TREATMENT PLAN:  Effective Dates: 2020 TO 2020 (90 days). Frequency/Duration: 2 times a week for 90 Day(s) MEDICAL/REFERRING DIAGNOSIS:  Unilateral primary osteoarthritis, left knee [M17.12]  DATE OF ONSET: 18-24 months ago  REFERRING PHYSICIAN: Rashid Vizcaino MD MD Orders: Evaluate and Treat  Return MD Appointment: No follow up appt at this time     INITIAL ASSESSMENT:  Mr. Claudette Arbour has attended 9 visits of physical therapy from 20 to 03-10-20 with increased ROM/strength L knee and decreased pain. He would benefit from continued skilled physical therapy services for continued ROM/strengthening L knee to help return to prior level of function. PROBLEM LIST (Impacting functional limitations):  1. Decreased Strength  2. Decreased ADL/Functional Activities  3. Increased Pain  4. Decreased Flexibility/Joint Mobility  5. Decreased [place] with Home Exercise Program INTERVENTIONS PLANNED: (Treatment may consist of any combination of the following)  1. Cold  2. Cryotherapy  3. Electrical Stimulation  4. Heat  5. Home Exercise Program (HEP)  6. Manual Therapy  7. Range of Motion (ROM)  8. Therapeutic Exercise/Strengthening  9. Ultrasound (US)   10. Aquatic Therapy     GOALS: (Goals have been discussed and agreed upon with patient.)  Short-Term Functional Goals: Time Frame: 4 weeks  1. Pt will increase ROM L knee 0-125 degrees to assist with household ADL's (Goal Met)  2.  Pt will increase strength L knee 4+/5 to assist with household ADL's (Goal Met)  3. Pt will be independent with HEP (Goal Met)  Discharge Goals: Time Frame: 12 weeks  1. Pt will increase strength L knee 5/5 to assist with household ADL's  2. Pt will ascend/descend 10 eight-inch steps independently with min to no c/o L knee pain  3. Pt will perform 20 minutes household cleaning activities independently with min to no c/o L knee pain    OUTCOME MEASURE:   Tool Used: Modified Oswestry Low Back Pain Questionnaire  Score:  Initial: 49/50  Most Recent: 45/50 (Date: 03-10-20)   Interpretation of Score: Each section is scored on a 0-5 scale, 5 representing the greatest disability. The scores of each section are added together for a total score of 50. MEDICAL NECESSITY:   · Patient is expected to demonstrate progress in strength, range of motion and functional technique to increase independence with household ADL's. · Patient demonstrates good rehab potential due to higher previous functional level. REASON FOR SERVICES/OTHER COMMENTS:  · Patient continues to require skilled intervention due to decreased ROM/strength L knee with increased pain leading to decreased functional status. Total Duration:  PT Patient Time In/Time Out  Time In: 1515  Time Out: 1610    Rehabilitation Potential For Stated Goals: Good  Regarding Kvng Cho's therapy, I certify that the treatment plan above will be carried out by a therapist or under their direction. Thank you for this referral,  Ivory Brower, PT     Referring Physician Signature: Mulugeta Holloway MD _______________________________ Date _____________     PAIN/SUBJECTIVE:   Initial:   2/10 Post Session:  2/10   HISTORY:   History of Injury/Illness (Reason for Referral):  Pt reports insidious onset L knee pain of  18-24 months duration. He states MD does not want to do a knee replacement at this time. He had a cortisone injection in his L knee with no relief. He c/o aching in his L knee and shin bone.   Pt is taking Tylenol twice daily which helps some. He rates his current L knee pain 2/10 sitting at rest, increasing to 9/10 at times. Aggravating factors include ascending/descending stairs and lying down at night. Pt lives with wife in a two-story house. He has difficulties with household cleaning activities due to his L knee pain. Pt has history of a lumbar fusion. Past Medical History/Comorbidities:   Mr. Davonte Hdez  has a past medical history of Adhesive capsulitis of shoulder, Anemia, unspecified, Anxiety state, unspecified, AR (allergic rhinitis), Arthritis, CAD (coronary artery disease) (1995), Cancer ([address] (2002), Cerebrovascular disease, unspecified (5/27/2015), Cerumen impaction, Cervicalgia, Chronic ethmoidal sinusitis, Chronic frontal sinusitis, Chronic ischemic heart disease, unspecified, Chronic lymphadenitis, Chronic maxillary sinusitis, Chronic pain, Coronary atherosclerosis of native coronary artery (5/27/2015), Degeneration of lumbar or lumbosacral intervertebral disc, Deviated septum, ED (erectile dysfunction), Fatigue, GERD (gastroesophageal reflux disease), H/O prostate cancer (2002), H/O seasonal allergies, High frequency hearing loss, HLD (hyperlipidemia) (6/4/2014), Hypercholesteremia, Hypertension, Hypertrophy of nasal turbinates, Loss of weight (8/13/2013), Malignant neoplasm of prostate ([address] (8/13/2013), MI (myocardial infarction) ([address] (1995), Nasal obstruction, Neuralgia, neuritis, and radiculitis, unspecified, Night sweat (8/19/2013), OA (osteoarthritis), Obstructive sleep apnea (10/23/2013), Organic hypersomnia, 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 He also has no past medical history of Difficult intubation, Malignant hyperthermia due to anesthesia, Nausea & vomiting, or Pseudocholinesterase deficiency.   Mr. Davonte Hdez  has a past surgical history that includes hx ptca (1995); hx hernia repair (Right, 2007); hx orthopaedic; hx lumbar laminectomy (3/2013); hx back surgery; hx other surgical (2016); hx cataract removal (Bilateral); hx tonsillectomy (childhood); pr sinus surgery proc unlisted (5/5/2016); hx heent; hx heent; and pr cardiac surg procedure unlist (04/26/2018). Social History/Living Environment:     Pt lives with wife in a two-story house  Prior Level of Function/Work/Activity:  Pt is retired  Dominant Side:         RIGHT  Other Clinical Tests:          X-rays L knee revealed arthritis   Personal Factors:          Sex:  male        Age:  81 y.o. Profession:  Pt si retired   Ambulatory/Rehab [address] [place] [address] Risk Assessment   Risk Factors:       (1)  Gender [Male] Ability to Rise from Chair:       (1)  Pushes up, successful in one attempt   [address]:       No modifications necessary   Total: (5 or greater = High Risk): 2   ©2010 [facility] [address] Patent #[id number]. Federal Law prohibits the replication, distribution or use without written permission from [facility] [place]   Current Medications:       Current Outpatient Medications:     ezetimibe (ZETIA) 10 mg tablet, TAKE 1 TABLET DAILY, Disp: 90 Tab, Rfl: 4    doxazosin (CARDURA) 2 mg tablet, Take 1 Tab by mouth daily. , Disp: 30 Tab, Rfl: 0    lidocaine 4 % patch, 1 Patch by TransDERmal route every twelve (12) hours every twelve (12) hours. , Disp: 28 Patch, Rfl: 0    pantoprazole (PROTONIX) 40 mg tablet, Take bid po can d/c daily dose, Disp: 180 Tab, Rfl: 3    REPATHA PUSHTRONEX 420 mg/3.5 mL Injt, every thirty (30) days. , Disp: , Rfl:     albuterol (PROVENTIL HFA, VENTOLIN HFA, PROAIR HFA) 90 mcg/actuation inhaler, Take 2 Puffs by inhalation every four (4) hours as needed for Wheezing., Disp: 1 Inhaler, Rfl: 1    turmeric root extract 500 mg cap, Take 500 mg by mouth two (2) times a day., Disp: , Rfl:     cyanocobalamin (VITAMIN B-12) 1,000 mcg tablet, Take 1,000 mcg by mouth daily., Disp: , Rfl:     omega 3-dha-epa-fish oil (FISH OIL) 100-160-1,000 mg cap, Take  by mouth daily. , Disp: , Rfl:     vitamin e (E GEMS) 100 unit capsule, Take  by mouth daily. , Disp: , Rfl:     ASPIRIN 81 mg Tab, Take 81 mg by mouth nightly., Disp: , Rfl:    Date Last Reviewed:  02-05-20   Number of Personal Factors/Comorbidities that affect the Plan of Care: 1-2: MODERATE COMPLEXITY   EXAMINATION:   Observation/Orthostatic Postural Assessment: Forward head, rounded shoulders  Palpation:          Generalized tenderness throughout L knee  ROM:    R knee extension = 0 degrees  R knee flexion = 125 degrees    L knee extension = 0 degrees  L knee flexion = 125 degrees      Strength:    R knee extension = 5/5  R knee flexion = 5/5    L knee extension = 4+/5  L knee flexion = 4+/5      Special Tests:          N/A  Neurological Screen:        Sensation:  Intact to light touch bilateral LE's  Functional Mobility:         Gait/Ambulation:  Pt walks with moderate antalgic gait        Transfers:  Pt able to transition sit to supine and supine to sit independently    Body Structures Involved:  1. Bones  2. Joints  3. Muscles Body Functions Affected:  1. Sensory/Pain  2. Neuromusculoskeletal Activities and Participation Affected:  1. Mobility  2.  Domestic Life   Number of elements (examined above) that affect the Plan of Care: 3: MODERATE COMPLEXITY   CLINICAL PRESENTATION:   Presentation: Evolving clinical presentation with changing clinical characteristics: MODERATE COMPLEXITY   CLINICAL DECISION MAKING:   Use of outcome tool(s) and clinical judgement create a POC that gives a: Questionable prediction of patient's progress: MODERATE COMPLEXITY

## 2020-03-11 ENCOUNTER — HOSPITAL ENCOUNTER (OUTPATIENT)
Dept: PHYSICAL THERAPY | Age: 85
Discharge: HOME | End: 2020-03-11
Payer: MEDICARE

## 2020-03-11 PROCEDURE — 97110 THERAPEUTIC EXERCISES: CPT

## 2020-03-11 NOTE — PROGRESS NOTES
Continue to follow up with the sleep doctors to find something to treat your sleep apnea.    Try a new blood pressure cuff. Take your BP daily and call my nurse in a couple of weeks with the readings. If you are consistently above 120s, I'll increase your amlodipine to 5 mg daily.    Jennifer [id number]   Mik Everett  : 1935  Primary:   Secondary:  Therapy Center at [facility]  [address], [facility] [address]  [phone]   [phone]         OUTPATIENT PHYSICAL THERAPY: Daily Treatment Note 3/11/2020  Visit Count:  10    ICD-10: Treatment Diagnosis: Pain in left knee (M25.562)  Precautions/Allergies:   Antihistamines; Other medication; and Statins-hmg-coa reductase inhibitors   TREATMENT PLAN:  Effective Dates: 2020 TO 2020 (90 days). Frequency/Duration: 2 times a week for 90 Day(s)    Pre-treatment Symptoms/Complaints:  L Knee Pain; Pt reports continued strength improvements in his L knee. Pain: Initial:   2/10 Post Session:  1/10   Medications Last Reviewed:  3/11/2020  Updated Objective Findings:  None Today  TREATMENT:     THERAPEUTIC EXERCISE: (40 minutes):  Exercises per grid below to improve mobility and strength. Required minimal verbal cues to promote proper body alignment and promote proper body posture. Progressed resistance and repetitions as indicated. MODALITIES: (10 minutes): Moist heat to L knee x10 minutes to decrease pain/soreness.   Skin clear afterwards      Date:  20 Date:  03-10-20 Date:  20   Activity/Exercise      Quad Sets 20 reps  5 sec holds 20 reps  5 sec holds 20 reps  5 sec holds   SLR Flexion 20 reps  3 sec holds 20 reps  3 sec holds 20 reps  3 sec holds   SAQ's 4 Lbs  20 reps 5 Lbs  20 reps 5 Lbs  20 reps   Heel Slides 20 reps  5 sec holds 20 reps  5 sec holds 20 reps  5 sec holds   LAQ's 4 Lbs  20 reps 5 Lbs  20 reps 5 Lbs  20 reps   NuStep Level 4  7 minutes Level 4  7 minutes Level 4  7 minutes   Standing Heel/Toe Raises 20 reps each  20 reps each 20 reps each   Step-Ups 6 inch step  20 reps 8 inch step  20 reps 8 inch step  20 reps   TKE with T-band Black T-band  20 reps Black T-band  20 reps Black T-band  20 reps   Gastroc Slantboard 3 reps  20 sec holds 3 reps  20 sec holds 3 reps  20 sec holds   Bridging 15 reps  5 sec holds 15 reps  5 sec holds 15 reps  5 sec holds   Nautilus Leg Press 40 Lbs  20 reps 40 Lbs  20 reps 40 Lbs  20 reps       MedBridge Portal  Treatment/Session Summary:    · Response to Treatment:  Pt tolerated all treatments well today with no c/o increased pain. Plan to continue x1-2 more weeks, then possible discharge if pt still doing well. · Communication/Consultation:  None today  · Equipment provided today:  None today  · Recommendations/Intent for next treatment session: Next visit will focus on progression of ROM/strengthening L knee as tolerable.     Total Treatment Billable Duration:  40 minutes  PT Patient Time In/Time Out  Time In: 1050  Time Out: Clarita Baker PT    Future Appointments   Date Time Provider Clarita Adam   6/11/2020 10:00 AM Myranda Scott MD [facility]

## 2020-03-17 ENCOUNTER — HOSPITAL ENCOUNTER (OUTPATIENT)
Dept: PHYSICAL THERAPY | Age: 85
Discharge: HOME | End: 2020-03-17
Payer: MEDICARE

## 2020-03-17 NOTE — PROGRESS NOTES
Pt cancelled his physical therapy appt for today (greater than 24 hrs in advance).     Ilya Roberto, PT

## 2020-03-18 ENCOUNTER — HOSPITAL ENCOUNTER (OUTPATIENT)
Dept: PHYSICAL THERAPY | Age: 85
Discharge: HOME | End: 2020-03-18
Payer: MEDICARE

## 2020-03-18 PROCEDURE — 97110 THERAPEUTIC EXERCISES: CPT

## 2020-03-18 NOTE — PROGRESS NOTES
Mahamed   : 1935  Primary:   Secondary:  [facility] at [place] [address], [place] [address].  [phone]   [phone]         OUTPATIENT PHYSICAL THERAPY: Daily Treatment Note 3/18/2020  Visit Count:  11    ICD-10: Treatment Diagnosis: Pain in left knee (M25.562)  Precautions/Allergies:   Antihistamines; Other medication; and Statins-hmg-coa reductase inhibitors   TREATMENT PLAN:  Effective Dates: 2020 TO 2020 (90 days). Frequency/Duration: 2 times a week for 90 Day(s)    Pre-treatment Symptoms/Complaints:  L Knee Pain; Pt states his knee is doing well. Pain: Initial:   0/10 Post Session:  0/10   Medications Last Reviewed:  3/18/2020  Updated Objective Findings:  None Today  TREATMENT:     THERAPEUTIC EXERCISE: (40 minutes):  Exercises per grid below to improve mobility and strength. Required minimal verbal cues to promote proper body alignment and promote proper body posture. Progressed resistance and repetitions as indicated. MODALITIES: (10 minutes): Moist heat to L knee x10 minutes to decrease pain/soreness.   Skin clear afterwards      Date:  20 Date:  03-10-20 Date:  20 Date:  20   Activity/Exercise       Quad Sets 20 reps  5 sec holds 20 reps  5 sec holds 20 reps  5 sec holds 20 reps  5 sec holds   SLR Flexion 20 reps  3 sec holds 20 reps  3 sec holds 20 reps  3 sec holds 20 reps  3 sec holds   SAQ's 4 Lbs  20 reps 5 Lbs  20 reps 5 Lbs  20 reps 5 Lbs  20 reps   Heel Slides 20 reps  5 sec holds 20 reps  5 sec holds 20 reps  5 sec holds 20 reps  5 sec holds   LAQ's 4 Lbs  20 reps 5 Lbs  20 reps 5 Lbs  20 reps 5 Lbs  20 reps   NuStep Level 4  7 minutes Level 4  7 minutes Level 4  7 minutes Level 4  7 minutes   Standing Heel/Toe Raises 20 reps each  20 reps each 20 reps each 20 reps each   Step-Ups 6 inch step  20 reps 8 inch step  20 reps 8 inch step  20 reps 8 inch step  20 reps   TKE with T-band Black T-band  20 reps Black T-band  20 reps Black T-band  20 reps Black T-band  20 reps   Gastroc Slantboard 3 reps  20 sec holds 3 reps  20 sec holds 3 reps  20 sec holds 3 reps  20 sec holds   Bridging 15 reps  5 sec holds 15 reps  5 sec holds 15 reps  5 sec holds 15 reps  5 sec holds   Nautilus Leg Press 40 Lbs  20 reps 40 Lbs  20 reps 40 Lbs  20 reps 40 Lbs  20 reps       MedBridge Portal  Treatment/Session Summary:    · Response to Treatment:  Pt tolerated all treatments well today with no c/o increased pain. Strength and pain improving well. · Communication/Consultation:  None today  · Equipment provided today:  None today  · Recommendations/Intent for next treatment session: Next visit will focus on progression of ROM/strengthening L knee as tolerable.     Total Treatment Billable Duration:  40 minutes  PT Patient Time In/Time Out  Time In: 1510  Time Out: 1600  Gris Rowley PT    Future Appointments   Date Time Provider Clarita Francoisti   3/23/2020  7:00 PM Ramon Mccallum [facility]   6/11/2020 10:00 AM Verona Scott MD [facility]

## 2020-03-25 ENCOUNTER — APPOINTMENT (OUTPATIENT)
Dept: PHYSICAL THERAPY | Age: 85
End: 2020-03-25
Payer: MEDICARE

## 2020-05-13 ENCOUNTER — HOSPITAL ENCOUNTER (OUTPATIENT)
Dept: PHYSICAL THERAPY | Age: 85
Discharge: HOME | End: 2020-05-13
Payer: MEDICARE

## 2020-05-13 PROCEDURE — 97110 THERAPEUTIC EXERCISES: CPT

## 2020-05-13 NOTE — THERAPY RECERTIFICATION
Andi Sousa  : 1935  [place]  Secondary: [address] [facility]  [address] [facility], [address] [facility] [address], [facility] [address].  [phone]   [phone]           OUTPATIENT PHYSICAL [address]   ICD-10: Treatment Diagnosis: Pain in left knee (M25.562)  Precautions/Allergies:   Antihistamines; Other medication; and Statins-hmg-coa reductase inhibitors   TREATMENT PLAN:  Effective Dates: 2020 TO 2020 (60 days). Frequency/Duration: 2 times a week for 60 Day(s) MEDICAL/REFERRING DIAGNOSIS:  Unilateral primary osteoarthritis, left knee [M17.12]  DATE OF ONSET: 18-24 months ago  REFERRING PHYSICIAN: Rebekah Guerrero MD MD Orders: Evaluate and Treat  Return MD Appointment: No follow up appt at this time     INITIAL ASSESSMENT:  Mr. Quintin Gilbert has attended 12 visits of physical therapy from 20 to 20 with increased ROM/strength L knee and decreased pain. He would benefit from continued skilled physical therapy services for continued ROM/strengthening L knee to help return to prior level of function. PROBLEM LIST (Impacting functional limitations):  1. Decreased Strength  2. Decreased ADL/Functional Activities  3. Increased Pain  4. Decreased Flexibility/Joint Mobility  5. Decreased Lewis with Home Exercise Program INTERVENTIONS PLANNED: (Treatment may consist of any combination of the following)  1. Cold  2. Cryotherapy  3. Electrical Stimulation  4. Heat  5. Home Exercise Program (HEP)  6. Manual Therapy  7. Range of Motion (ROM)  8. Therapeutic Exercise/Strengthening  9. Ultrasound (US)   10. Aquatic Therapy     GOALS: (Goals have been discussed and agreed upon with patient.)  Short-Term Functional Goals: Time Frame: 4 weeks  1. Pt will increase ROM L knee 0-125 degrees to assist with household ADL's (Goal Met)  2.  Pt will increase strength L knee 4+/5 to assist with household ADL's (Goal Met)  3. Pt will be independent with HEP (Goal Met)  Discharge Goals: Time Frame: 12 weeks  1. Pt will increase strength L knee 5/5 to assist with household ADL's (Goal Ongoing)  2. Pt will ascend/descend 10 eight-inch steps independently with min to no c/o L knee pain (Goal Ongoing)  3. Pt will perform 20 minutes household cleaning activities independently with min to no c/o L knee pain (Goal Ongoing)    OUTCOME MEASURE:   Tool Used: Lower Extremity Functional Scale (LEFS)  Score:  Initial: 47/80 Most Recent: X/80 (Date: -- )   Interpretation of Score: 20 questions each scored on a 5 point scale with 0 representing \"extreme difficulty or unable to perform\" and 4 representing \"no difficulty\". The lower the score, the greater the functional disability. 80/80 represents no disability. Minimal detectable change is 9 points. MEDICAL NECESSITY:   · Patient is expected to demonstrate progress in strength, range of motion and functional technique to increase independence with household ADL's. · Patient demonstrates good rehab potential due to higher previous functional level. REASON FOR SERVICES/OTHER COMMENTS:  · Patient continues to require skilled intervention due to decreased ROM/strength L knee with increased pain leading to decreased functional status. Total Duration:  PT Patient Time In/Time Out  Time In: 1345  Time Out: 1445    Rehabilitation Potential For Stated Goals: Good  Regarding Kvng Cho's therapy, I certify that the treatment plan above will be carried out by a therapist or under their direction. Thank you for this referral,  Stacey Barker PT     Referring Physician Signature: Dariana Murrell MD _______________________________ Date _____________     PAIN/SUBJECTIVE:   Initial:   2/10 Post Session:  2/10   HISTORY:   History of Injury/Illness (Reason for Referral):  Pt reports insidious onset L knee pain of  18-24 months duration.   He states MD does not want to do a knee replacement at this time. He had a cortisone injection in his L knee with no relief. He c/o aching in his L knee and shin bone. Pt is taking Tylenol twice daily which helps some. He rates his current L knee pain 2/10 sitting at rest, increasing to 9/10 at times. Aggravating factors include ascending/descending stairs and lying down at night. Pt lives with wife in a two-story house. He has difficulties with household cleaning activities due to his L knee pain. Pt has history of a lumbar fusion. 5/13/2020:  Pt reports good and bad days. He states today is a pretty good day. He rates his current pain 3/10. He states the pain is worst on rainy days. Pt is taking Tylenol twice daily for his L knee. He states orthopedist mentioned a TKA, but pt stated he is not interested in surgery. Pt has no follow up appt with MD at this time.   Past Medical History/Comorbidities:   Mr. Sagrario Lew  has a past medical history of Adhesive capsulitis of shoulder, Anemia, unspecified, Anxiety state, unspecified, AR (allergic rhinitis), Arthritis, CAD (coronary artery disease) (1995), Cancer ([address] (2002), Cerebrovascular disease, unspecified (5/27/2015), Cerumen impaction, Cervicalgia, Chronic ethmoidal sinusitis, Chronic frontal sinusitis, Chronic ischemic heart disease, unspecified, Chronic lymphadenitis, Chronic maxillary sinusitis, Chronic pain, Coronary atherosclerosis of native coronary artery (5/27/2015), Degeneration of lumbar or lumbosacral intervertebral disc, Deviated septum, ED (erectile dysfunction), Fatigue, GERD (gastroesophageal reflux disease), H/O prostate cancer (2002), H/O seasonal allergies, High frequency hearing loss, HLD (hyperlipidemia) (6/4/2014), Hypercholesteremia, Hypertension, Hypertrophy of nasal turbinates, Loss of weight (8/13/2013), Malignant neoplasm of prostate ([address] (8/13/2013), MI (myocardial infarction) ([facility] [address] (1995), Nasal obstruction, Neuralgia, neuritis, and radiculitis, unspecified, Night sweat (8/19/2013), OA (osteoarthritis), Obstructive sleep apnea (10/23/2013), Organic hypersomnia, 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 He also has no past medical history of Difficult intubation, Malignant hyperthermia due to anesthesia, Nausea & vomiting, or Pseudocholinesterase deficiency. Mr. Chel Grady  has a past surgical history that includes hx ptca (1995); hx hernia repair (Right, 2007); hx orthopaedic; hx lumbar laminectomy (3/2013); hx back surgery; hx other surgical (2016); hx cataract removal (Bilateral); hx tonsillectomy (childhood); pr sinus surgery proc unlisted (5/5/2016); hx heent; hx heent; and pr cardiac surg procedure unlist (04/26/2018). Social History/Living Environment:     Pt lives with wife in a two-story house  Prior Level of Function/Work/Activity:  Pt is retired  Dominant Side:         RIGHT  Other Clinical Tests:          X-rays L knee revealed arthritis   Personal Factors:          Sex:  male        Age:  81 y.o. Profession:  Pt si retired   Ambulatory/Rehab [address] [place] [address] Risk Assessment   Risk Factors:       (1)  Gender [Male] Ability to Rise from Chair:       (1)  Pushes up, successful in one attempt   [address]:       No modifications necessary   Total: (5 or greater = High Risk): 2   ©2010 [facility] [address] Patent #[id number]. Federal Law prohibits the replication, distribution or use without written permission from [facility] [employer]   Current Medications:       Current Outpatient Medications:     pantoprazole (PROTONIX) 40 mg tablet, TAKE 1 TABLET TWICE A DAY (DISCONTINUE DAILY DOSE), Disp: 180 Tab, Rfl: 3    ezetimibe (ZETIA) 10 mg tablet, TAKE 1 TABLET DAILY, Disp: 90 Tab, Rfl: 4    doxazosin (CARDURA) 2 mg tablet, Take 1 Tab by mouth daily. , Disp: 30 Tab, Rfl: 0    lidocaine 4 % patch, 1 Patch by TransDERmal route every twelve (12) hours every twelve (12) hours. , Disp: 28 Patch, Rfl: 0    REPATHA PUSHTRONEX 420 mg/3.5 mL Injt, every thirty (30) days. , Disp: , Rfl:     albuterol (PROVENTIL HFA, VENTOLIN HFA, PROAIR HFA) 90 mcg/actuation inhaler, Take 2 Puffs by inhalation every four (4) hours as needed for Wheezing., Disp: 1 Inhaler, Rfl: 1    turmeric root extract 500 mg cap, Take 500 mg by mouth two (2) times a day., Disp: , Rfl:     cyanocobalamin (VITAMIN B-12) 1,000 mcg tablet, Take 1,000 mcg by mouth daily. , Disp: , Rfl:     omega 3-dha-epa-fish oil (FISH OIL) 100-160-1,000 mg cap, Take  by mouth daily. , Disp: , Rfl:     vitamin e (E GEMS) 100 unit capsule, Take  by mouth daily. , Disp: , Rfl:     ASPIRIN 81 mg Tab, Take 81 mg by mouth nightly., Disp: , Rfl:    Date Last Reviewed:  02-05-20   Number of Personal Factors/Comorbidities that affect the Plan of Care: 1-2: MODERATE COMPLEXITY   EXAMINATION:   Observation/Orthostatic Postural Assessment: Forward head, rounded shoulders  Palpation:          Generalized tenderness throughout L knee  ROM:    R knee extension = 0 degrees  R knee flexion = 125 degrees    L knee extension = 0 degrees  L knee flexion = 125 degrees      Strength:    R knee extension = 5/5  R knee flexion = 5/5    L knee extension = 4+/5  L knee flexion = 4+/5      Special Tests:          N/A  Neurological Screen:        Sensation:  Intact to light touch bilateral LE's  Functional Mobility:         Gait/Ambulation:  Pt walks with moderate antalgic gait        Transfers:  Pt able to transition sit to supine and supine to sit independently    Body Structures Involved:  1. Bones  2. Joints  3. Muscles Body Functions Affected:  1. Sensory/Pain  2. Neuromusculoskeletal Activities and Participation Affected:  1. Mobility  2.  Domestic Life   Number of elements (examined above) that affect the Plan of Care: 3: MODERATE COMPLEXITY   CLINICAL PRESENTATION:   Presentation: Evolving clinical presentation with changing clinical characteristics: MODERATE COMPLEXITY   CLINICAL DECISION MAKING:   Use of outcome tool(s) and clinical judgement create a POC that gives a: Questionable prediction of patient's progress: MODERATE COMPLEXITY

## 2020-05-18 ENCOUNTER — HOSPITAL ENCOUNTER (OUTPATIENT)
Dept: PHYSICAL THERAPY | Age: 85
Discharge: HOME | End: 2020-05-18
Payer: MEDICARE

## 2020-05-18 PROCEDURE — 97110 THERAPEUTIC EXERCISES: CPT

## 2020-05-18 NOTE — PROGRESS NOTES
Fercho Burnham  : 1935  Primary:   Secondary:  Therapy Center at [address] [place] [address],[address], [address] [facility]  [phone]   [phone]         OUTPATIENT PHYSICAL THERAPY: Daily Treatment Note 2020  Visit Count:  13    ICD-10: Treatment Diagnosis: Pain in left knee (M25.562)  Precautions/Allergies:   Antihistamines; Other medication; and Statins-hmg-coa reductase inhibitors   TREATMENT PLAN:  Effective Dates: 2020 TO 2020 (90 days). Frequency/Duration: 2 times a week for 60 Day(s)    Pre-treatment Symptoms/Complaints:  L Knee Pain; Pt states his knee felt really good the day following his last session, but he is having pain today. Pain: Initial:   2/10 Post Session:  0/10   Medications Last Reviewed:  2020  Updated Objective Findings:  None Today  TREATMENT:     THERAPEUTIC EXERCISE: (40 minutes):  Exercises per grid below to improve mobility and strength. Required minimal verbal cues to promote proper body alignment and promote proper body posture. Progressed resistance and repetitions as indicated. MODALITIES: (10 minutes): Moist heat to L knee x10 minutes to decrease pain/soreness.   Skin clear afterwards      Date:  20 Date:  20 Date:  20 Date:  20   Activity/Exercise       Quad Sets 20 reps  5 sec holds 20 reps  5 sec holds 20 reps  5 sec holds 20 reps  5 sec holds   SLR Flexion 20 reps  3 sec holds 20 reps  3 sec holds 20 reps  3 sec holds 20 reps  3 sec holds   SAQ's 5 Lbs  20 reps 5 Lbs  20 reps 5 Lbs  20 reps 5 Lbs  20 reps   Heel Slides 20 reps  5 sec holds 20 reps  5 sec holds 20 reps  5 sec holds 20 reps  5 sec holds   LAQ's 5 Lbs  20 reps 5 Lbs  20 reps 5 Lbs  20 reps 5 Lbs  20 reps   NuStep Level 4  7 minutes Level 4  7 minutes Level 4  7 minutes Level 4  7 minutes   Standing Heel/Toe Raises 20 reps each 20 reps each 20 reps each 20 reps each   Step-Ups 8 inch step  20 reps 8 inch step  20 reps 6 inch step  20 reps 6 inch step  20 reps   TKE with T-band Black T-band  20 reps Black T-band  20 reps Black T-band  20 reps Black T-band  20 reps   Gastroc Slantboard 3 reps  20 sec holds 3 reps  20 sec holds 3 reps  20 sec holds 3 reps  30 sec holds   Bridging 15 reps  5 sec holds 15 reps  5 sec holds 15 reps  5 sec holds 15 reps  5 sec holds   Nautilus Leg Press 40 Lbs  20 reps 40 Lbs  20 reps 40 Lbs  20 reps 40 Lbs  20 reps   Nautilus Leg Curls    30 Lbs  20 reps       MedBridge Portal  Treatment/Session Summary:    · Response to Treatment:  Pt tolerated all treatments well today with no c/o increased pain. Decreased knee pain following session today. · Communication/Consultation:  None today  · Equipment provided today:  None today  · Recommendations/Intent for next treatment session: Next visit will focus on progression of ROM/strengthening L knee as tolerable.     Total Treatment Billable Duration:  40 minutes  PT Patient Time In/Time Out  Time In: 1300  Time Out: 1350  Kayla Cervantes PT    Future Appointments   Date Time Provider Clarita Adam   5/20/2020  9:30 AM Peter Mariscal PT [facility] SFE   6/11/2020 10:00 AM Reuben Lombardo MD [facility]

## 2020-05-20 ENCOUNTER — HOSPITAL ENCOUNTER (OUTPATIENT)
Dept: PHYSICAL THERAPY | Age: 85
Discharge: HOME | End: 2020-05-20
Payer: MEDICARE

## 2020-05-20 PROCEDURE — 97110 THERAPEUTIC EXERCISES: CPT

## 2020-05-20 NOTE — PROGRESS NOTES
Katalina Irving  : 1935  Primary:   Secondary:  Therapy Center at [address], [address] [facility]  [phone]   [phone]         OUTPATIENT PHYSICAL THERAPY: Daily Treatment Note 2020  Visit Count:  14    ICD-10: Treatment Diagnosis: Pain in left knee (M25.562)  Precautions/Allergies:   Antihistamines; Other medication; and Statins-hmg-coa reductase inhibitors   TREATMENT PLAN:  Effective Dates: 2020 TO 2020 (90 days). Frequency/Duration: 2 times a week for 60 Day(s)    Pre-treatment Symptoms/Complaints:  L Knee Pain; Pt states his knee is feeling better than his last visit. Pain: Initial:   110 Post Session:  010   Medications Last Reviewed:  2020  Updated Objective Findings:  None Today  TREATMENT:     THERAPEUTIC EXERCISE: (40 minutes):  Exercises per grid below to improve mobility and strength. Required minimal verbal cues to promote proper body alignment and promote proper body posture. Progressed resistance and repetitions as indicated. MODALITIES: (10 minutes): Moist heat to L knee x10 minutes to decrease pain/soreness.   Skin clear afterwards      Date:  20 Date:  20 Date:  20   Activity/Exercise      Quad Sets 20 reps  5 sec holds 20 reps  5 sec holds 20 reps  5 sec holds   SLR Flexion 20 reps  3 sec holds 20 reps  3 sec holds 20 reps  3 sec holds   SAQ's 5 Lbs  20 reps 5 Lbs  20 reps 5 Lbs  20 reps   Heel Slides 20 reps  5 sec holds 20 reps  5 sec holds 20 reps  5 sec holds   LAQ's 5 Lbs  20 reps 5 Lbs  20 reps 5 Lbs  20 reps   NuStep Level 4  7 minutes Level 4  7 minutes Level 4  7 minutes   Standing Heel/Toe Raises 20 reps each 20 reps each 20 reps each   Step-Ups 6 inch step  20 reps 6 inch step  20 reps 6 inch step  20 reps   TKE with T-band Black T-band  20 reps Black T-band  20 reps Black T-band  20 reps   Gastroc Slantboard 3 reps  20 sec holds 3 reps  30 sec holds 3 reps  30 sec holds   Bridging 15 reps  5 sec holds 15 reps  5 sec holds 20 reps  5 sec holds   Nautilus Leg Press 40 Lbs  20 reps 40 Lbs  20 reps 40 Lbs  20 reps   Nautilus Leg Curls  30 Lbs  20 reps 30 Lbs  20 reps       MedBridge Portal  Treatment/Session Summary:    · Response to Treatment:  Pt tolerated all treatments well today with no c/o increased pain. Pain improving L knee. · Communication/Consultation:  None today  · Equipment provided today:  None today  · Recommendations/Intent for next treatment session: Next visit will focus on progression of ROM/strengthening L knee as tolerable.     Total Treatment Billable Duration:  40 minutes  PT Patient Time In/Time Out  Time In: 0805  Time Out: [address], PT    Future Appointments   Date Time Provider Clarita Adam   5/20/2020  9:30 AM Sherry Arshad, PT [facility] SFE   5/26/2020  8:45 AM Wiljenia Claudioa, PT [facility]   5/28/2020  9:30 AM Wiljenia Claudioa, PT [facility]   6/11/2020 10:00 AM Mateo Oconnell MD [facility]

## 2020-05-26 ENCOUNTER — HOSPITAL ENCOUNTER (OUTPATIENT)
Dept: PHYSICAL THERAPY | Age: 85
Discharge: HOME | End: 2020-05-26
Payer: MEDICARE

## 2020-05-26 PROCEDURE — 97110 THERAPEUTIC EXERCISES: CPT

## 2020-05-26 NOTE — PROGRESS NOTES
Buel Peabody  : 1935  Primary:   Secondary:  Therapy Center at [address], [address] [facility]  [phone]   [phone]         OUTPATIENT PHYSICAL THERAPY: Daily Treatment Note 2020  Visit Count:  15    ICD-10: Treatment Diagnosis: Pain in left knee (M25.562)  Precautions/Allergies:   Antihistamines; Other medication; and Statins-hmg-coa reductase inhibitors   TREATMENT PLAN:  Effective Dates: 2020 TO 2020 (90 days). Frequency/Duration: 2 times a week for 60 Day(s)    Pre-treatment Symptoms/Complaints:  L Knee Pain; Pt states his knee is feeling ok this morning. Pain: Initial:   110 Post Session:  010   Medications Last Reviewed:  2020  Updated Objective Findings:  None Today  TREATMENT:     THERAPEUTIC EXERCISE: (40 minutes):  Exercises per grid below to improve mobility and strength. Required minimal verbal cues to promote proper body alignment and promote proper body posture. Progressed resistance and repetitions as indicated. MODALITIES: (10 minutes): Moist heat to L knee x10 minutes to decrease pain/soreness.   Skin clear afterwards      Date:  20 Date:  20 Date:  20 Date:  20   Activity/Exercise       Quad Sets 20 reps  5 sec holds 20 reps  5 sec holds 20 reps  5 sec holds 20 reps  5 sec holds   SLR Flexion 20 reps  3 sec holds 20 reps  3 sec holds 20 reps  3 sec holds 20 reps  3 sec holds   SAQ's 5 Lbs  20 reps 5 Lbs  20 reps 5 Lbs  20 reps 5 Lbs  20 reps   Heel Slides 20 reps  5 sec holds 20 reps  5 sec holds 20 reps  5 sec holds 20 reps  5 sec holds   LAQ's 5 Lbs  20 reps 5 Lbs  20 reps 5 Lbs  20 reps 5 Lbs  20 reps   NuStep Level 4  7 minutes Level 4  7 minutes Level 4  7 minutes Level 4  7 minutes   Standing Heel/Toe Raises 20 reps each 20 reps each 20 reps each 20 reps each   Step-Ups 6 inch step  20 reps 6 inch step  20 reps 6 inch step  20 reps 6 inch step  20 reps   TKE with T-band Black T-band  20 reps Black T-band  20 reps Black T-band  20 reps Black T-band  20 reps   Gastroc Slantboard 3 reps  20 sec holds 3 reps  30 sec holds 3 reps  30 sec holds 3 reps  30 sec holds   Bridging 15 reps  5 sec holds 15 reps  5 sec holds 20 reps  5 sec holds 20 reps  5 sec holds   Nautilus Leg Press 40 Lbs  20 reps 40 Lbs  20 reps 40 Lbs  20 reps 40 Lbs  20 reps   Nautilus Leg Curls  30 Lbs  20 reps 30 Lbs  20 reps 30 Lbs  20 reps       MedBridge Portal  Treatment/Session Summary:    · Response to Treatment:  Pt tolerated all treatments well today with no c/o increased pain. Decreased L knee pain following treatments today. · Communication/Consultation:  None today  · Equipment provided today:  None today  · Recommendations/Intent for next treatment session: Next visit will focus on progression of ROM/strengthening L knee as tolerable.     Total Treatment Billable Duration:  40 minutes  PT Patient Time In/Time Out  Time In: 0840  Time Out: Jayro Manriquez PT    Future Appointments   Date Time Provider Clarita Adam   5/28/2020  9:30 AM Toi Villagran PT [facility] SFE   6/11/2020 10:00 AM Sana Garcia MD [facility]

## 2020-05-28 ENCOUNTER — HOSPITAL ENCOUNTER (OUTPATIENT)
Dept: PHYSICAL THERAPY | Age: 85
Discharge: HOME | End: 2020-05-28
Payer: MEDICARE

## 2020-05-28 PROCEDURE — 97110 THERAPEUTIC EXERCISES: CPT

## 2020-05-28 NOTE — PROGRESS NOTES
Jonas Rosales  : 1935  Primary:   Secondary:  [facility] [place] [address], [place] [address].  [phone]   [phone]         OUTPATIENT PHYSICAL THERAPY: Daily Treatment Note 2020  Visit Count:  16    ICD-10: Treatment Diagnosis: Pain in left knee (M25.562)  Precautions/Allergies:   Antihistamines; Other medication; and Statins-hmg-coa reductase inhibitors   TREATMENT PLAN:  Effective Dates: 2020 TO 2020 (90 days). Frequency/Duration: 2 times a week for 60 Day(s)    Pre-treatment Symptoms/Complaints:  L Knee Pain; Pt states his knee is feeling pretty good this morning. Pain: Initial:   110 Post Session:  0/10   Medications Last Reviewed:  2020  Updated Objective Findings:  None Today  TREATMENT:     THERAPEUTIC EXERCISE: (40 minutes):  Exercises per grid below to improve mobility and strength. Required minimal verbal cues to promote proper body alignment and promote proper body posture. Progressed resistance and repetitions as indicated. MODALITIES: (10 minutes): Moist heat to L knee x10 minutes to decrease pain/soreness.   Skin clear afterwards      Date:  20 Date:  20 Date:  20   Activity/Exercise      Quad Sets 20 reps  5 sec holds 20 reps  5 sec holds 20 reps  5 sec holds   SLR Flexion 20 reps  3 sec holds 20 reps  3 sec holds 20 reps  3 sec holds   SAQ's 5 Lbs  20 reps 5 Lbs  20 reps 5 Lbs  20 reps   Heel Slides 20 reps  5 sec holds 20 reps  5 sec holds 20 reps  5 sec holds   LAQ's 5 Lbs  20 reps 5 Lbs  20 reps 5 Lbs  20 reps   NuStep Level 4  7 minutes Level 4  7 minutes Level 4  7 minutes   Standing Heel/Toe Raises 20 reps each 20 reps each 20 reps each   Step-Ups 6 inch step  20 reps 6 inch step  20 reps 6 inch step  20 reps   TKE with T-band Black T-band  20 reps Black T-band  20 reps Black T-band  20 reps   Gastroc Slantboard 3 reps  30 sec holds 3 reps  30 sec holds 3 reps  30 sec holds   Bridging 20 reps  5 sec holds 20 reps  5 sec holds 20 reps  5 sec holds   Nautilus Leg Press 40 Lbs  20 reps 40 Lbs  20 reps 40 Lbs  20 reps   Nautilus Leg Curls 30 Lbs  20 reps 30 Lbs  20 reps 30 Lbs  20 reps       MedBridge Portal  Treatment/Session Summary:    · Response to Treatment:  Pt tolerated all treatments well today with no c/o increased pain. Pt doing well with strengthening and ROM exercises. · Communication/Consultation:  None today  · Equipment provided today:  None today  · Recommendations/Intent for next treatment session: Next visit will focus on progression of ROM/strengthening L knee as tolerable.     Total Treatment Billable Duration:  40 minutes  PT Patient Time In/Time Out  Time In: 7260  Time Out: CAROLYN Harrell    Future Appointments   Date Time Provider Clairta Butcheri   6/2/2020  9:30 AM Wilber Santos PT [facility]   6/4/2020  8:45 AM Wilber Santos PT [facility]   6/11/2020 10:00 AM José Miguel Masters MD [facility]

## 2020-06-02 ENCOUNTER — HOSPITAL ENCOUNTER (OUTPATIENT)
Dept: PHYSICAL THERAPY | Age: 85
Discharge: HOME | End: 2020-06-02
Payer: MEDICARE

## 2020-06-02 PROCEDURE — 97110 THERAPEUTIC EXERCISES: CPT

## 2020-06-02 NOTE — PROGRESS NOTES
Kelly Esquivel  : 1935  Primary:   Secondary:  [facility] [place] [address], [facility].  [phone]   [phone]         OUTPATIENT PHYSICAL THERAPY: Daily Treatment Note 2020  Visit Count:  17    ICD-10: Treatment Diagnosis: Pain in left knee (M25.562)  Precautions/Allergies:   Antihistamines; Other medication; and Statins-hmg-coa reductase inhibitors   TREATMENT PLAN:  Effective Dates: 2020 TO 2020 (90 days). Frequency/Duration: 2 times a week for 60 Day(s)    Pre-treatment Symptoms/Complaints:  L Knee Pain; Pt states his knee is feeling better today. Pain: Initial:   110 Post Session:  010   Medications Last Reviewed:  2020  Updated Objective Findings:  None Today  TREATMENT:     THERAPEUTIC EXERCISE: (40 minutes):  Exercises per grid below to improve mobility and strength. Required minimal verbal cues to promote proper body alignment and promote proper body posture. Progressed resistance and repetitions as indicated. MODALITIES: (10 minutes): Moist heat to L knee x10 minutes to decrease pain/soreness.   Skin clear afterwards      Date:  20 Date:  20 Date:  20 Date:  20   Activity/Exercise       Quad Sets 20 reps  5 sec holds 20 reps  5 sec holds 20 reps  5 sec holds 20 reps  5 sec holds   SLR Flexion 20 reps  3 sec holds 20 reps  3 sec holds 20 reps  3 sec holds 20 reps  3 sec holds   SAQ's 5 Lbs  20 reps 5 Lbs  20 reps 5 Lbs  20 reps 5 Lbs  20 reps   Heel Slides 20 reps  5 sec holds 20 reps  5 sec holds 20 reps  5 sec holds 20 reps  5 sec holds   LAQ's 5 Lbs  20 reps 5 Lbs  20 reps 5 Lbs  20 reps 5 Lbs  20 reps   NuStep Level 4  7 minutes Level 4  7 minutes Level 4  7 minutes Level 4  7 minutes   Standing Heel/Toe Raises 20 reps each 20 reps each 20 reps each 20 reps each   Step-Ups 6 inch step  20 reps 6 inch step  20 reps 6 inch step  20 reps 8 inch step  20 reps   TKE with T-band Black T-band  20 reps Black T-band  20 reps Black T-band  20 reps Black T-band  20 reps   Gastroc Slantboard 3 reps  30 sec holds 3 reps  30 sec holds 3 reps  30 sec holds 3 reps  20 sec holds   Bridging 20 reps  5 sec holds 20 reps  5 sec holds 20 reps  5 sec holds 20 reps  5 sec holds   Nautilus Leg Press 40 Lbs  20 reps 40 Lbs  20 reps 40 Lbs  20 reps 40 Lbs  20 reps   Nautilus Leg Curls 30 Lbs  20 reps 30 Lbs  20 reps 30 Lbs  20 reps 30 Lbs  20 reps       MedBridge Portal  Treatment/Session Summary:    · Response to Treatment:  Pt tolerated all treatments well today with no c/o increased pain. Pain improving L knee. · Communication/Consultation:  None today  · Equipment provided today:  None today  · Recommendations/Intent for next treatment session: Next visit will focus on progression of ROM/strengthening L knee as tolerable.     Total Treatment Billable Duration:  40 minutes  PT Patient Time In/Time Out  Time In: 0586  Time Out: CAROLYN Harrell    Future Appointments   Date Time Provider Clarita Adam   6/4/2020  8:45 AM Lilliam Bray PT [facility] SFE   6/11/2020 10:00 AM Chani Barcenas MD [facility]

## 2020-06-04 ENCOUNTER — HOSPITAL ENCOUNTER (OUTPATIENT)
Dept: PHYSICAL THERAPY | Age: 85
Discharge: HOME | End: 2020-06-04
Payer: MEDICARE

## 2020-06-04 PROCEDURE — 97110 THERAPEUTIC EXERCISES: CPT

## 2020-06-04 NOTE — PROGRESS NOTES
Ivory Edwards  : 1935  Primary:   Secondary:  [facility] [place] [address] [facility], [place] [address].  [phone]   [phone]         OUTPATIENT PHYSICAL THERAPY: Daily Treatment Note 2020  Visit Count:  18    ICD-10: Treatment Diagnosis: Pain in left knee (M25.562)  Precautions/Allergies:   Antihistamines; Other medication; and Statins-hmg-coa reductase inhibitors   TREATMENT PLAN:  Effective Dates: 2020 TO 2020 (90 days). Frequency/Duration: 2 times a week for 60 Day(s)    Pre-treatment Symptoms/Complaints:  L Knee Pain; Pt states his knee is a little sore today. Pain: Initial:   110 Post Session:  010   Medications Last Reviewed:  2020  Updated Objective Findings:  None Today  TREATMENT:     THERAPEUTIC EXERCISE: (40 minutes):  Exercises per grid below to improve mobility and strength. Required minimal verbal cues to promote proper body alignment and promote proper body posture. Progressed resistance and repetitions as indicated. MODALITIES: (10 minutes): Moist heat to L knee x10 minutes to decrease pain/soreness.   Skin clear afterwards      Date:  20 Date:  20 Date:  20   Activity/Exercise      Quad Sets 20 reps  5 sec holds 20 reps  5 sec holds 20 reps  5 sec holds   SLR Flexion 20 reps  3 sec holds 20 reps  3 sec holds 20 reps  3 sec holds   SAQ's 5 Lbs  20 reps 5 Lbs  20 reps 5 Lbs  20 reps   Heel Slides 20 reps  5 sec holds 20 reps  5 sec holds 20 reps  5 sec holds   LAQ's 5 Lbs  20 reps 5 Lbs  20 reps 5 Lbs  20 reps   NuStep Level 4  7 minutes Level 4  7 minutes Level 4  7 minutes   Standing Heel/Toe Raises 20 reps each 20 reps each 20 reps each   Step-Ups 6 inch step  20 reps 8 inch step  20 reps 8  Inch step  20 reps   TKE with T-band Black T-band  20 reps Black T-band  20 reps Black T-band  20 reps   Gastroc Slantboard 3 reps  30 sec holds 3 reps  20 sec holds 3 reps  30 sec holds   Bridging 20 reps  5 sec holds 20 reps  5 sec holds 20 reps  5 sec holds   Nautilus Leg Press 40 Lbs  20 reps 40 Lbs  20 reps 40 Lbs  20 reps   Nautilus Leg Curls 30 Lbs  20 reps 30 Lbs  20 reps 30 Lbs  20 reps   T-band Hip Abduction   Green T-band  20 reps       MedBridge Portal  Treatment/Session Summary:    · Response to Treatment:  Pt tolerated all treatments well today with no c/o increased pain. Pt with knee soreness today and is to have acupuncture this afternoon. · Communication/Consultation:  None today  · Equipment provided today:  None today  · Recommendations/Intent for next treatment session: Next visit will focus on progression of ROM/strengthening L knee as tolerable.     Total Treatment Billable Duration:  40 minutes  PT Patient Time In/Time Out  Time In: 0845  Time Out: [address], PT    Future Appointments   Date Time Provider Clarita Adam   6/10/2020  8:45 AM [facility]as[facility]   6/11/2020 10:00 AM Franchester Peer, Leola Cooks, MD [facility]

## 2020-06-10 ENCOUNTER — HOSPITAL ENCOUNTER (OUTPATIENT)
Dept: PHYSICAL THERAPY | Age: 85
Discharge: HOME | End: 2020-06-10
Payer: MEDICARE

## 2020-06-10 PROCEDURE — 97110 THERAPEUTIC EXERCISES: CPT

## 2020-06-10 NOTE — PROGRESS NOTES
Davon Kraus  : 1935  Primary:   Secondary:  [facility] [place] [address], [facility].  [phone]   [phone]         OUTPATIENT PHYSICAL THERAPY: Daily Treatment Note 6/10/2020  Visit Count:  19    ICD-10: Treatment Diagnosis: Pain in left knee (M25.562)  Precautions/Allergies:   Antihistamines; Other medication; and Statins-hmg-coa reductase inhibitors   TREATMENT PLAN:  Effective Dates: 2020 TO 2020 (90 days). Frequency/Duration: 2 times a week for 60 Day(s)    Pre-treatment Symptoms/Complaints:  L Knee Pain; Pt states his knee is feeling better today. He states he started using Voltaren Gel which he feels has helped also. Pain: Initial:   1/10 Post Session:  010   Medications Last Reviewed:  6/10/2020  Updated Objective Findings:  None Today  TREATMENT:     THERAPEUTIC EXERCISE: (40 minutes):  Exercises per grid below to improve mobility and strength. Required minimal verbal cues to promote proper body alignment and promote proper body posture. Progressed resistance and repetitions as indicated. MODALITIES: (10 minutes): Moist heat to L knee x10 minutes to decrease pain/soreness.   Skin clear afterwards      Date:  20 Date:  20 Date:  20 Date:  06-10-20   Activity/Exercise       Quad Sets 20 reps  5 sec holds 20 reps  5 sec holds 20 reps  5 sec holds 20 reps  5 sec holds   SLR Flexion 20 reps  3 sec holds 20 reps  3 sec holds 20 reps  3 sec holds 20 reps  3 sec holds   SAQ's 5 Lbs  20 reps 5 Lbs  20 reps 5 Lbs  20 reps 5 Lbs  20 reps   Heel Slides 20 reps  5 sec holds 20 reps  5 sec holds 20 reps  5 sec holds 20 reps  5 sec holds   LAQ's 5 Lbs  20 reps 5 Lbs  20 reps 5 Lbs  20 reps 5 Lbs  20 reps   NuStep Level 4  7 minutes Level 4  7 minutes Level 4  7 minutes Level 4  7 minutes   Standing Heel/Toe Raises 20 reps each 20 reps each 20 reps each 20 reps each   Step-Ups 6 inch step  20 reps 8 inch step  20 reps 8  Inch step  20 reps 8 inch step  20 reps   TKE with T-band Black T-band  20 reps Black T-band  20 reps Black T-band  20 reps Black T-band  20 reps   Gastroc Slantboard 3 reps  30 sec holds 3 reps  20 sec holds 3 reps  30 sec holds 3 reps  30 sec holds   Bridging 20 reps  5 sec holds 20 reps  5 sec holds 20 reps  5 sec holds 20 reps  5 sec holds   Nautilus Leg Press 40 Lbs  20 reps 40 Lbs  20 reps 40 Lbs  20 reps 40 Lbs  20 reps   Nautilus Leg Curls 30 Lbs  20 reps 30 Lbs  20 reps 30 Lbs  20 reps 30 Lbs  20 reps   T-band Hip Abduction   Green T-band  20 reps Green T-band  20 reps       MedBridge Portal  Treatment/Session Summary:    · Response to Treatment:  Pt tolerated all treatments well today with no c/o increased pain. Decreased knee pain today. Going to decrease frequency to once a week. · Communication/Consultation:  None today  · Equipment provided today:  None today  · Recommendations/Intent for next treatment session: Next visit will focus on progression of ROM/strengthening L knee as tolerable.     Total Treatment Billable Duration:  40 minutes  PT Patient Time In/Time Out  Time In: 1300  Time Out: 1350  Kayla Cervantes PT    Future Appointments   Date Time Provider Clarita Adam   6/11/2020 10:00 AM Reuben Scott MD [facility]

## 2020-06-17 ENCOUNTER — HOSPITAL ENCOUNTER (OUTPATIENT)
Dept: PHYSICAL THERAPY | Age: 85
Discharge: HOME | End: 2020-06-17
Payer: MEDICARE

## 2020-06-17 PROCEDURE — 97110 THERAPEUTIC EXERCISES: CPT

## 2020-06-17 NOTE — PROGRESS NOTES
Lacy Screen  : 1935  Primary:   Secondary:  [facility] Center at [address] [place] [address] [address], [address] [facility] [address], [facility].  [phone]   [phone]         OUTPATIENT PHYSICAL THERAPY: Daily Treatment Note 2020  Visit Count:  20    ICD-10: Treatment Diagnosis: Pain in left knee (M25.562)  Precautions/Allergies:   Antihistamines; Other medication; and Statins-hmg-coa reductase inhibitors   TREATMENT PLAN:  Effective Dates: 2020 TO 2020 (90 days). Frequency/Duration: 2 times a week for 60 Day(s)    Pre-treatment Symptoms/Complaints:  L Knee Pain; Pt states he had a cortisone injection and is sleeping better and feeling better overall in his L knee. Pain: Initial:   1/10 Post Session:  010   Medications Last Reviewed:  2020  Updated Objective Findings:  None Today  TREATMENT:     THERAPEUTIC EXERCISE: (40 minutes):  Exercises per grid below to improve mobility and strength. Required minimal verbal cues to promote proper body alignment and promote proper body posture. Progressed resistance and repetitions as indicated. MODALITIES: (10 minutes): Moist heat to L knee x10 minutes to decrease pain/soreness.   Skin clear afterwards      Date:  20 Date:  20 Date:  20 Date:  06-10-20 Date:  20   Activity/Exercise        Quad Sets 20 reps  5 sec holds 20 reps  5 sec holds 20 reps  5 sec holds 20 reps  5 sec holds 20 reps  5 sec holds   SLR Flexion 20 reps  3 sec holds 20 reps  3 sec holds 20 reps  3 sec holds 20 reps  3 sec holds 20 reps  3 sec holds   SAQ's 5 Lbs  20 reps 5 Lbs  20 reps 5 Lbs  20 reps 5 Lbs  20 reps 5 Lbs  20 reps   Heel Slides 20 reps  5 sec holds 20 reps  5 sec holds 20 reps  5 sec holds 20 reps  5 sec holds 20 reps  5 sec holds   LAQ's 5 Lbs  20 reps 5 Lbs  20 reps 5 Lbs  20 reps 5 Lbs  20 reps 5 Lbs  20 reps   NuStep Level 4  7 minutes Level 4  7 minutes Level 4  7 minutes Level 4  7 minutes Level 4  7 minutes   Standing Heel/Toe Raises 20 reps each 20 reps each 20 reps each 20 reps each 20 reps each   Step-Ups 6 inch step  20 reps 8 inch step  20 reps 8  Inch step  20 reps 8 inch step  20 reps 8 inch step  20 reps   TKE with T-band Black T-band  20 reps Black T-band  20 reps Black T-band  20 reps Black T-band  20 reps Black T-band  20 reps   Gastroc Slantboard 3 reps  30 sec holds 3 reps  20 sec holds 3 reps  30 sec holds 3 reps  30 sec holds 3 reps  20 sec holds   Bridging 20 reps  5 sec holds 20 reps  5 sec holds 20 reps  5 sec holds 20 reps  5 sec holds 20 reps  5 sec holds   Nautilus Leg Press 40 Lbs  20 reps 40 Lbs  20 reps 40 Lbs  20 reps 40 Lbs  20 reps 40 Lbs  20 reps   Nautilus Leg Curls 30 Lbs  20 reps 30 Lbs  20 reps 30 Lbs  20 reps 30 Lbs  20 reps 30 Lbs  20 reps   T-band Hip Abduction   Green T-band  20 reps Green T-band  20 reps Green T-band  20 reps       MedBridge Portal  Treatment/Session Summary:    · Response to Treatment:  Pt tolerated all treatments well today with no c/o increased pain. Pain doing much better today. Will plan to continue once weekly  x2 more visits, then discharge to independent [facility]. · Communication/Consultation:  None today  · Equipment provided today:  None today  · Recommendations/Intent for next treatment session: Next visit will focus on progression of ROM/strengthening L knee as tolerable.     Total Treatment Billable Duration:  40 minutes  PT Patient Time In/Time Out  Time In: 6862  Time Out: Cade Deng PT    Future Appointments   Date Time Provider Clarita Adam   6/24/2020 10:15 AM Peter Mariscal PT [facility]IO   7/1/2020 11:00 AM Peter Mariscal PT [facility]

## 2020-06-24 ENCOUNTER — HOSPITAL ENCOUNTER (OUTPATIENT)
Dept: PHYSICAL THERAPY | Age: 85
Discharge: HOME | End: 2020-06-24
Payer: MEDICARE

## 2020-06-24 PROCEDURE — 97110 THERAPEUTIC EXERCISES: CPT

## 2020-06-24 NOTE — PROGRESS NOTES
Jonas Rosales  : 1935  Primary:   Secondary:  [facility] at [address], [address], [facility].  [phone]   [phone]         OUTPATIENT PHYSICAL THERAPY: Daily Treatment Note 2020  Visit Count:  21    ICD-10: Treatment Diagnosis: Pain in left knee (M25.562)  Precautions/Allergies:   Antihistamines; Other medication; and Statins-hmg-coa reductase inhibitors   TREATMENT PLAN:  Effective Dates: 2020 TO 2020 (90 days). Frequency/Duration: 2 times a week for 60 Day(s)    Pre-treatment Symptoms/Complaints:  L Knee Pain; Pt states his knee continues to improve and is feeling good today. Pain: Initial:   10 Post Session:  010   Medications Last Reviewed:  2020  Updated Objective Findings:  None Today  TREATMENT:     THERAPEUTIC EXERCISE: (40 minutes):  Exercises per grid below to improve mobility and strength. Required minimal verbal cues to promote proper body alignment and promote proper body posture. Progressed resistance and repetitions as indicated. MODALITIES: (10 minutes): Moist heat to L knee x10 minutes to decrease pain/soreness.   Skin clear afterwards      Date:  06-10-20 Date:  20 Date:  20   Activity/Exercise      Quad Sets 20 reps  5 sec holds 20 reps  5 sec holds 20 reps  5 sec holds   SLR Flexion 20 reps  3 sec holds 20 reps  3 sec holds 20 reps  3 sec holds   SAQ's 5 Lbs  20 reps 5 Lbs  20 reps 5 Lbs  20 reps   Heel Slides 20 reps  5 sec holds 20 reps  5 sec holds 20 reps  5 sec holds   LAQ's 5 Lbs  20 reps 5 Lbs  20 reps 5 Lbs  20 reps   NuStep Level 4  7 minutes Level 4  7 minutes Level 4  7 minutes   Standing Heel/Toe Raises 20 reps each 20 reps each 20 reps each   Step-Ups 8 inch step  20 reps 8 inch step  20 reps 8 inch step  20 reps   TKE with T-band Black T-band  20 reps Black T-band  20 reps Black T-band  20 reps   Gastroc Slantboard 3 reps  30 sec holds 3 reps  20 sec holds 3 reps  20 sec holds   Bridging 20 reps  5 sec holds 20 reps  5 sec holds 20 reps  5 sec holds   Nautilus Leg Press 40 Lbs  20 reps 40 Lbs  20 reps 40 Lbs  20 reps   Nautilus Leg Curls 30 Lbs  20 reps 30 Lbs  20 reps 35 Lbs  20 reps   T-band Hip Abduction Green T-band  20 reps Green T-band  20 reps Green T-band  20 reps       MedBridge Portal  Treatment/Session Summary:    · Response to Treatment:  Pt tolerated all treatments well today with no c/o increased pain. Pain continues to improve with current treatments. Continue x1 more visit, then discharge to independent [facility]. · Communication/Consultation:  None today  · Equipment provided today:  None today  · Recommendations/Intent for next treatment session: Next visit will focus on progression of ROM/strengthening L knee as tolerable.     Total Treatment Billable Duration:  40 minutes  PT Patient Time In/Time Out  Time In: 4712  Time Out: 1105  Cinda Keita PT    Future Appointments   Date Time Provider Clarita Adam   7/1/2020 11:00 AM Jeramy Morales, PT [facility]

## 2021-07-01 PROBLEM — M51.36 DDD (DEGENERATIVE DISC DISEASE), LUMBAR: Status: ACTIVE | Noted: 2018-05-10

## 2021-07-08 ENCOUNTER — APPOINTMENT (OUTPATIENT)
Dept: PHYSICAL THERAPY | Age: 86
End: 2021-07-08
Attending: INTERNAL MEDICINE

## 2022-03-19 PROBLEM — R39.198 SLOW URINARY STREAM: Status: ACTIVE | Noted: 2018-07-11

## 2022-03-19 PROBLEM — R06.02 SHORTNESS OF BREATH: Status: ACTIVE | Noted: 2019-03-20

## 2022-03-19 PROBLEM — I50.9 HEART FAILURE (HCC): Status: ACTIVE | Noted: 2018-05-06

## 2022-03-19 PROBLEM — Z95.1 HX OF CABG: Status: ACTIVE | Noted: 2018-04-26

## 2022-03-19 PROBLEM — I25.5 ISCHEMIC CARDIOMYOPATHY: Status: ACTIVE | Noted: 2018-06-22

## 2022-03-19 PROBLEM — K59.01 SLOW TRANSIT CONSTIPATION: Status: ACTIVE | Noted: 2018-05-16

## 2022-03-19 PROBLEM — M51.36 DDD (DEGENERATIVE DISC DISEASE), LUMBAR: Status: ACTIVE | Noted: 2018-05-10

## 2022-03-19 PROBLEM — G47.00 INSOMNIA: Status: ACTIVE | Noted: 2018-05-09

## 2022-03-19 PROBLEM — B36.9 FUNGAL SKIN INFECTION: Status: ACTIVE | Noted: 2018-05-11

## 2022-07-11 ENCOUNTER — TELEPHONE (OUTPATIENT)
Dept: INTERNAL MEDICINE CLINIC | Facility: CLINIC | Age: 87
End: 2022-07-11

## 2022-07-11 NOTE — TELEPHONE ENCOUNTER
Patient call asking to speak to Georges Fortune about medication (could not find medication on patient's med list); patient indicates he may need refill but will not know until he discusses with Georges Fortune

## 2022-08-09 RX ORDER — ESZOPICLONE 2 MG/1
TABLET, FILM COATED ORAL
Qty: 90 TABLET | Refills: 1 | OUTPATIENT
Start: 2022-08-09

## 2022-09-12 DIAGNOSIS — F51.01 PRIMARY INSOMNIA: ICD-10-CM

## 2022-09-12 DIAGNOSIS — F51.01 PRIMARY INSOMNIA: Primary | ICD-10-CM

## 2022-09-12 RX ORDER — ESZOPICLONE 2 MG/1
2 TABLET, FILM COATED ORAL NIGHTLY
Qty: 30 TABLET | Refills: 0 | Status: SHIPPED | OUTPATIENT
Start: 2022-09-12 | End: 2022-09-12 | Stop reason: SDUPTHER

## 2022-09-12 RX ORDER — ESZOPICLONE 2 MG/1
2 TABLET, FILM COATED ORAL NIGHTLY
Qty: 90 TABLET | Refills: 1 | Status: SHIPPED | OUTPATIENT
Start: 2022-09-12 | End: 2022-12-11

## 2022-09-12 NOTE — TELEPHONE ENCOUNTER
Dr Victor Manuel Greenwood please sent in q 30 day supply to local then a 90 day supply to his mail order

## 2022-09-12 NOTE — TELEPHONE ENCOUNTER
Patient request for refill of lunesta 2 m day supply to [facility] on [address]    90 day supply to [facility]    Patient is out of medication

## 2022-09-22 NOTE — PROGRESS NOTES
-- DO NOT REPLY / DO NOT REPLY ALL --  -- Message is from [employer] ([employer]) --    General Patient Message     Patient's  Mother is requesting to speak with Dr. Phylicia Jj or his nurse. She did not state why, just that patient would like to speak with them.     Caller Information       Type Contact Phone/Fax    09/22/2022 02:06 PM CDT Phone (Incoming) HIRO BUTTERFIELD (Mother) [phone]        Alternative phone number:[phone]    Can a detailed message be left? Yes    Message Turnaround:     Is it Working Hours? Yes - Working Hours     [place]:    Please give this turnaround time to the caller:   \"This message will be sent to [state Provider's name]. The clinical team will fulfill your request as soon as they review your message.\"                 Kelly Esquivel  : 1935  Primary:   Secondary:  [facility] [place] [address], [place] [address].  [phone]   [phone]         OUTPATIENT PHYSICAL THERAPY: Daily Treatment Note 2020  Visit Count:  12    ICD-10: Treatment Diagnosis: Pain in left knee (M25.562)  Precautions/Allergies:   Antihistamines; Other medication; and Statins-hmg-coa reductase inhibitors   TREATMENT PLAN:  Effective Dates: 2020 TO 2020 (90 days). Frequency/Duration: 2 times a week for 60 Day(s)    Pre-treatment Symptoms/Complaints:  L Knee Pain; Pt states his knee is doing well. Pain: Initial:   0/10 Post Session:  0/10   Medications Last Reviewed:  2020  Updated Objective Findings:  None Today  TREATMENT:     THERAPEUTIC EXERCISE: (45 minutes):  Exercises per grid below to improve mobility and strength. Required minimal verbal cues to promote proper body alignment and promote proper body posture. Progressed resistance and repetitions as indicated. MODALITIES: (10 minutes): Moist heat to L knee x10 minutes to decrease pain/soreness.   Skin clear afterwards      Date:  20 Date:  20 Date:  20   Activity/Exercise      Quad Sets 20 reps  5 sec holds 20 reps  5 sec holds 20 reps  5 sec holds   SLR Flexion 20 reps  3 sec holds 20 reps  3 sec holds 20 reps  3 sec holds   SAQ's 5 Lbs  20 reps 5 Lbs  20 reps 5 Lbs  20 reps   Heel Slides 20 reps  5 sec holds 20 reps  5 sec holds 20 reps  5 sec holds   LAQ's 5 Lbs  20 reps 5 Lbs  20 reps 5 Lbs  20 reps   NuStep Level 4  7 minutes Level 4  7 minutes Level 4  7 minutes   Standing Heel/Toe Raises 20 reps each 20 reps each 20 reps each   Step-Ups 8 inch step  20 reps 8 inch step  20 reps 6 inch step  20 reps   TKE with T-band Black T-band  20 reps Black T-band  20 reps Black T-band  20 reps   Gastroc Slantboard 3 reps  20 sec holds 3 reps  20 sec holds 3 reps  20 sec holds   Bridging 15 reps  5 sec holds 15 reps  5 sec holds 15 reps  5 sec holds   Nautilus Leg Press 40 Lbs  20 reps 40 Lbs  20 reps 40 Lbs  20 reps       MedBridge Portal  Treatment/Session Summary:    · Response to Treatment:  Pt tolerated all treatments well today with no c/o increased pain. Improved knee pain after treatments today. · Communication/Consultation:  None today  · Equipment provided today:  None today  · Recommendations/Intent for next treatment session: Next visit will focus on progression of ROM/strengthening L knee as tolerable.     Total Treatment Billable Duration:  45 minutes  PT Patient Time In/Time Out  Time In: 1190  Time Out: 6238 Jamee Siu PT    Future Appointments   Date Time Provider [facility]   5/18/2020  1:00 PM Elyssa Bradshaw PT [facility] SFE   5/20/2020  9:30 AM CAROLYN Ng SFE   6/11/2020 10:00 AM Dorinda Will MD [facility]

## 2022-09-27 RX ORDER — TAMSULOSIN HYDROCHLORIDE 0.4 MG/1
CAPSULE ORAL
Qty: 30 CAPSULE | Refills: 11 | Status: SHIPPED | OUTPATIENT
Start: 2022-09-27

## 2022-11-03 ENCOUNTER — HOSPITAL ENCOUNTER (EMERGENCY)
Age: 87
Discharge: HOME | End: 2022-11-03
Attending: EMERGENCY MEDICINE
Payer: MEDICARE

## 2022-11-03 ENCOUNTER — HOSPITAL ENCOUNTER (EMERGENCY)
Dept: CT IMAGING | Age: 87
Discharge: HOME | End: 2022-11-06
Payer: MEDICARE

## 2022-11-03 VITALS
HEIGHT: 67 IN | OXYGEN SATURATION: 99 % | WEIGHT: 120 LBS | RESPIRATION RATE: 27 BRPM | SYSTOLIC BLOOD PRESSURE: 137 MMHG | TEMPERATURE: 99.4 F | HEART RATE: 100 BPM | DIASTOLIC BLOOD PRESSURE: 69 MMHG | BODY MASS INDEX: 18.83 KG/M2

## 2022-11-03 DIAGNOSIS — R10.9 ABDOMINAL PAIN, UNSPECIFIED ABDOMINAL LOCATION: Primary | ICD-10-CM

## 2022-11-03 DIAGNOSIS — R19.7 DIARRHEA, UNSPECIFIED TYPE: ICD-10-CM

## 2022-11-03 LAB
ALBUMIN SERPL-MCNC: 3.1 G/DL (ref 3.2–4.6)
ALBUMIN/GLOB SERPL: 0.8 {RATIO} (ref 0.4–1.6)
ALP SERPL-CCNC: 53 U/L (ref 50–136)
ALT SERPL-CCNC: 13 U/L (ref 12–65)
ANION GAP SERPL CALC-SCNC: 8 MMOL/L (ref 2–11)
AST SERPL-CCNC: 10 U/L (ref 15–37)
BASOPHILS # BLD: 0 K/UL (ref 0–0.2)
BASOPHILS NFR BLD: 1 % (ref 0–2)
BILIRUB SERPL-MCNC: 0.6 MG/DL (ref 0.2–1.1)
BUN SERPL-MCNC: 23 MG/DL (ref 8–23)
CALCIUM SERPL-MCNC: 8.8 MG/DL (ref 8.3–10.4)
CHLORIDE SERPL-SCNC: 103 MMOL/L (ref 101–110)
CO2 SERPL-SCNC: 25 MMOL/L (ref 21–32)
CREAT SERPL-MCNC: 1.14 MG/DL (ref 0.8–1.5)
DIFFERENTIAL METHOD BLD: ABNORMAL
EOSINOPHIL # BLD: 0 K/UL (ref 0–0.8)
EOSINOPHIL NFR BLD: 0 % (ref 0.5–7.8)
ERYTHROCYTE [DISTWIDTH] IN BLOOD BY AUTOMATED COUNT: 13.7 % (ref 11.9–14.6)
GLOBULIN SER CALC-MCNC: 3.8 G/DL (ref 2.8–4.5)
GLUCOSE SERPL-MCNC: 122 MG/DL (ref 65–100)
HCT VFR BLD AUTO: 32.6 % (ref 41.1–50.3)
HGB BLD-MCNC: 10.8 G/DL (ref 13.6–17.2)
IMM GRANULOCYTES # BLD AUTO: 0 K/UL (ref 0–0.5)
IMM GRANULOCYTES NFR BLD AUTO: 1 % (ref 0–5)
LACTATE SERPL-SCNC: 0.7 MMOL/L (ref 0.4–2)
LIPASE SERPL-CCNC: 61 U/L (ref 73–393)
LYMPHOCYTES # BLD: 0.3 K/UL (ref 0.5–4.6)
LYMPHOCYTES NFR BLD: 8 % (ref 13–44)
MCH RBC QN AUTO: 27.6 PG (ref 26.1–32.9)
MCHC RBC AUTO-ENTMCNC: 33.1 G/DL (ref 31.4–35)
MCV RBC AUTO: 83.4 FL (ref 82–102)
MONOCYTES # BLD: 0.8 K/UL (ref 0.1–1.3)
MONOCYTES NFR BLD: 20 % (ref 4–12)
NEUTS SEG # BLD: 2.7 K/UL (ref 1.7–8.2)
NEUTS SEG NFR BLD: 71 % (ref 43–78)
NRBC # BLD: 0 K/UL (ref 0–0.2)
PLATELET # BLD AUTO: 204 K/UL (ref 150–450)
PMV BLD AUTO: 9.9 FL (ref 9.4–12.3)
POTASSIUM SERPL-SCNC: 3.9 MMOL/L (ref 3.5–5.1)
PROT SERPL-MCNC: 6.9 G/DL (ref 6.3–8.2)
RBC # BLD AUTO: 3.91 M/UL (ref 4.23–5.6)
SODIUM SERPL-SCNC: 136 MMOL/L (ref 133–143)
WBC # BLD AUTO: 3.9 K/UL (ref 4.3–11.1)

## 2022-11-03 PROCEDURE — 2580000003 HC RX 258: Performed by: EMERGENCY MEDICINE

## 2022-11-03 PROCEDURE — 83605 ASSAY OF LACTIC ACID: CPT

## 2022-11-03 PROCEDURE — 85025 COMPLETE CBC W/AUTO DIFF WBC: CPT

## 2022-11-03 PROCEDURE — 83690 ASSAY OF LIPASE: CPT

## 2022-11-03 PROCEDURE — 6360000004 HC RX CONTRAST MEDICATION: Performed by: EMERGENCY MEDICINE

## 2022-11-03 PROCEDURE — 74177 CT ABD & PELVIS W/CONTRAST: CPT

## 2022-11-03 PROCEDURE — 99285 EMERGENCY DEPT VISIT HI MDM: CPT

## 2022-11-03 PROCEDURE — 80053 COMPREHEN METABOLIC PANEL: CPT

## 2022-11-03 RX ORDER — SODIUM CHLORIDE 0.9 % (FLUSH) 0.9 %
10 SYRINGE (ML) INJECTION
Status: COMPLETED | OUTPATIENT
Start: 2022-11-03 | End: 2022-11-03

## 2022-11-03 RX ORDER — 0.9 % SODIUM CHLORIDE 0.9 %
100 INTRAVENOUS SOLUTION INTRAVENOUS
Status: COMPLETED | OUTPATIENT
Start: 2022-11-03 | End: 2022-11-03

## 2022-11-03 RX ORDER — HYOSCYAMINE SULFATE 0.12 MG/1
1 TABLET SUBLINGUAL 3 TIMES DAILY PRN
Qty: 12 EACH | Refills: 0 | Status: SHIPPED | OUTPATIENT
Start: 2022-11-03 | End: 2022-11-11 | Stop reason: ALTCHOICE

## 2022-11-03 RX ADMIN — SODIUM CHLORIDE 100 ML: 9 INJECTION, SOLUTION INTRAVENOUS at 22:34

## 2022-11-03 RX ADMIN — DIATRIZOATE MEGLUMINE AND DIATRIZOATE SODIUM 15 ML: 660; 100 LIQUID ORAL; RECTAL at 21:32

## 2022-11-03 RX ADMIN — SODIUM CHLORIDE, PRESERVATIVE FREE 10 ML: 5 INJECTION INTRAVENOUS at 22:34

## 2022-11-03 RX ADMIN — IOPAMIDOL 100 ML: 755 INJECTION, SOLUTION INTRAVENOUS at 22:32

## 2022-11-03 ASSESSMENT — ENCOUNTER SYMPTOMS
ABDOMINAL PAIN: 1
COUGH: 0
SHORTNESS OF BREATH: 0
BACK PAIN: 0
VOMITING: 0
DIARRHEA: 1

## 2022-11-03 ASSESSMENT — PAIN - FUNCTIONAL ASSESSMENT: PAIN_FUNCTIONAL_ASSESSMENT: 0-10

## 2022-11-03 ASSESSMENT — PAIN SCALES - GENERAL: PAINLEVEL_OUTOF10: 3

## 2022-11-03 NOTE — ED TRIAGE NOTES
Pt ambulatory to triage with c/o mid abd pain and diarrhea since Sunday evening. Pt denies N/V. Pt reports decreased appetite, but reports he is able to keep fluids down. Pt states he has tried Imodium.

## 2022-11-04 NOTE — ED PROVIDER NOTES
Emergency Department Provider Note                   PCP:                Larry Wells MD               Age: 81 y.o. Sex: male     No diagnosis found. DISPOSITION          MDM  Number of Diagnoses or Management Options  Abdominal pain, unspecified abdominal location  Diarrhea, unspecified type  Diagnosis management comments: Blood work shows white blood count 3.9, hemoglobin 10.8 hematocrit 32.6, normal basic panel and LFTs. Lipase and lactic also normal.  CT scan shows diverticulosis but no diverticulitis. There is a 3 cm splenic lesion new compared to CT scan from 2013. Radiology recommends follow-up CT in 6 months. No surgical or bacterial infectious process identified at this time. For now we will treat his discomfort with Levsin. Follow-up with primary care next week.        Amount and/or Complexity of Data Reviewed  Clinical lab tests: ordered and reviewed  Tests in the radiology section of CPT®: ordered and reviewed  Independent visualization of images, tracings, or specimens: yes    Risk of Complications, Morbidity, and/or Mortality  Presenting problems: moderate  Diagnostic procedures: moderate  Management options: moderate               Orders Placed This Encounter   Procedures    CT ABDOMEN PELVIS W IV CONTRAST Additional Contrast? Oral    CBC with Diff    CMP    Lipase    Lactate, Sepsis (Select if patient is over 65 to rule out mesenteric ischemia)    Diet NPO    POCT Urine Dipstick    EKG 12 Lead (Select if Upper Abd Pain, or SOB, Diaphoresis or Tachy)    Saline lock IV        Medications   diatrizoate meglumine-sodium (GASTROGRAFIN) 66-10 % solution 15 mL (has no administration in time range)       New Prescriptions    No medications on file        Joella Dubin is a 81 y.o. male who presents to the Emergency Department with chief complaint of    Chief Complaint   Patient presents with    Abdominal Pain      73-year-old white male history of coronary artery disease presents with lower abdominal pain for the past 4 days. Pain is described as a dull discomfort. Does not radiate into his back. Has also had some diarrhea. Decreased appetite. No urinary symptoms nausea or vomiting. No aggravating or alleviating factors. Review of Systems   Constitutional:  Negative for fever. HENT:  Negative for congestion. Respiratory:  Negative for cough and shortness of breath. Cardiovascular:  Negative for chest pain. Gastrointestinal:  Positive for abdominal pain and diarrhea. Negative for vomiting. Genitourinary:  Negative for dysuria. Musculoskeletal:  Negative for back pain. Neurological:  Negative for headaches. All other systems reviewed and are negative.     Past Medical History:   Diagnosis Date    Adhesive capsulitis of shoulder     Anemia, unspecified     Anxiety state, unspecified     AR (allergic rhinitis)     Arthritis     osteo generalized    CAD (coronary artery disease) 1995    MI, angioplasty    [facility] 2002    prostate/xrt    Cerebrovascular disease, unspecified 5/27/2015    Cerumen impaction     Cervicalgia     Chronic ethmoidal sinusitis     Chronic frontal sinusitis     Chronic ischemic heart disease, unspecified     Chronic lymphadenitis     Chronic maxillary sinusitis     Chronic pain     lower back    Coronary atherosclerosis of native coronary artery 5/27/2015    Degeneration of lumbar or lumbosacral intervertebral disc     Deviated septum     ED (erectile dysfunction)     Fatigue     GERD (gastroesophageal reflux disease)     managed with medication     H/O prostate cancer 2002    treated with radiation    H/O seasonal allergies     managed with Nasocort spray    High frequency hearing loss     HLD (hyperlipidemia) 6/4/2014    Hypercholesteremia     managed with medication     Hypertension     controlled with meds    Hypertrophy of nasal turbinates     Loss of weight 8/13/2013    Malignant neoplasm of prostate ([facility] 8/13/2013    MI (myocardial infarction) ([facility]     Nasal obstruction     Neuralgia, neuritis, and radiculitis, unspecified     Night sweat 2013    OA (osteoarthritis)     Obstructive sleep apnea 10/23/2013    sinus surgery corrected sleep apnea    Organic hypersomnia, unspecified 2014    Pain in joint, shoulder region     Prostate cancer [facility])     had radiation     Sleep apnea     SNHL (sensorineural hearing loss)         Past Surgical History:   Procedure Laterality Date    BACK SURGERY      CATARACT REMOVAL Bilateral     bilateral with lens implant    HEENT      S/P SMRITs, Balloon Sinuplasty    HEENT      3 different eye surgeries    HERNIA REPAIR Right 2007    [facility]    LUMBAR LAMINECTOMY  3/2013    Dr. Maria G York      multiple DANNIE    OTHER SURGICAL HISTORY      sinus     [place] CARDIAC SURG PROCEDURE UNLIST  2018    PTCA      angioplasty    SINUS SURGERY PROC UNLISTED  2016    sinus surgery-Dr Namrata Ramos    TONSILLECTOMY  childhood        Family History   Problem Relation Age of Onset    Stroke Mother     Cancer Sister         breast    Heart Disease Mother     Prostate Cancer Father     Heart Disease Father         Social History     Socioeconomic History    Marital status:      Spouse name: None    Number of children: None    Years of education: None    Highest education level: None   Tobacco Use    Smoking status: Former     Packs/day: 0.25     Types: Cigarettes     Quit date: 2015     Years since quittin.5    Smokeless tobacco: Never    Tobacco comments:     Quit smoking: Pipe and cigar   Substance and Sexual Activity    Alcohol use: Yes    Drug use: No     Types: OTC, Prescription         Antihistamines, diphenhydramine-type     Previous Medications    CYANOCOBALAMIN 1000 MCG TABLET    Take 1,000 mcg by mouth daily    DOXAZOSIN (CARDURA) 1 MG TABLET    Take 1 mg by mouth 2 times daily    ESZOPICLONE (LUNESTA) 2 MG TABS    Take 1 tablet by mouth nightly for 90 days.     EVOLOCUMAB WITH INFUSOR ([facility]) 420 MG/3.5ML SOCT    every 30 days    EZETIMIBE (ZETIA) 10 MG TABLET    TAKE 1 TABLET DAILY    PANTOPRAZOLE (PROTONIX) 40 MG TABLET    TAKE 1 TABLET TWICE A DAY (DISCONTINUE DAILY DOSE)    TAMSULOSIN (FLOMAX) 0.4 MG CAPSULE    TAKE 1 CAPSULE NIGHTLY    VITAMIN E 100 UNITS CAPSULE    Take 100 Units by mouth daily        Vitals signs and nursing note reviewed. Patient Vitals for the past 4 hrs:   Temp Pulse Resp BP SpO2   11/03/22 1937 99.4 °F (37.4 °C) (!) 104 17 121/66 96 %          Physical Exam  Vitals and nursing note reviewed. Constitutional:       General: He is not in acute distress. Appearance: Normal appearance. He is not toxic-appearing. HENT:      Head: Normocephalic and atraumatic. Nose: Nose normal.      Mouth/Throat:      Mouth: Mucous membranes are moist.   Eyes:      Conjunctiva/sclera: Conjunctivae normal.      Pupils: Pupils are equal, round, and reactive to light. Cardiovascular:      Rate and Rhythm: Normal rate and regular rhythm. Pulmonary:      Effort: Pulmonary effort is normal.      Breath sounds: Normal breath sounds. Abdominal:      Palpations: Abdomen is soft. Tenderness: There is abdominal tenderness in the suprapubic area. There is no guarding or rebound. Musculoskeletal:         General: No swelling. Normal range of motion. Cervical back: Normal range of motion and neck supple. Skin:     General: Skin is warm and dry. Neurological:      Mental Status: He is alert and oriented to person, place, and time.    Psychiatric:         Mood and Affect: Mood normal.         Behavior: Behavior normal.        EKG 12 Lead (Select if Upper Abd Pain, or SOB, Diaphoresis or Tachy)    Date/Time: 11/3/2022 9:17 PM  Performed by: Alena Mendoza MD  Authorized by: Alena Mendoza MD     ECG reviewed by ED Physician in the absence of a cardiologist: yes    Interpretation:     Interpretation: abnormal    Rate:     ECG rate: 96    ECG rate assessment: normal    Rhythm:     Rhythm: sinus rhythm    QRS:     QRS conduction: RBBB    ST segments:     ST segments:  Normal  T waves:     T waves: normal      Results for orders placed or performed during the hospital encounter of 11/03/22   CBC with Diff   Result Value Ref Range    WBC 3.9 (L) 4.3 - 11.1 K/uL    RBC 3.91 (L) 4.23 - 5.6 M/uL    Hemoglobin 10.8 (L) 13.6 - 17.2 g/dL    Hematocrit 32.6 (L) 41.1 - 50.3 %    MCV 83.4 82.0 - 102.0 FL    MCH 27.6 26.1 - 32.9 PG    MCHC 33.1 31.4 - 35.0 g/dL    RDW 13.7 11.9 - 14.6 %    Platelets 469 195 - 175 K/uL    MPV 9.9 9.4 - 12.3 FL    nRBC 0.00 0.0 - 0.2 K/uL    Differential Type AUTOMATED      Seg Neutrophils 71 43 - 78 %    Lymphocytes 8 (L) 13 - 44 %    Monocytes 20 (H) 4.0 - 12.0 %    Eosinophils % 0 (L) 0.5 - 7.8 %    Basophils 1 0.0 - 2.0 %    Immature Granulocytes 1 0.0 - 5.0 %    Segs Absolute 2.7 1.7 - 8.2 K/UL    Absolute Lymph # 0.3 (L) 0.5 - 4.6 K/UL    Absolute Mono # 0.8 0.1 - 1.3 K/UL    Absolute Eos # 0.0 0.0 - 0.8 K/UL    Basophils Absolute 0.0 0.0 - 0.2 K/UL    Absolute Immature Granulocyte 0.0 0.0 - 0.5 K/UL        CT ABDOMEN PELVIS W IV CONTRAST Additional Contrast? Oral    (Results Pending)                       Voice dictation software was used during the making of this note. This software is not perfect and grammatical and other typographical errors may be present. This note has not been completely proofread for errors.      Heath Abdi MD  11/03/22 3106       Heath Abdi MD  11/03/22 [id number]

## 2022-11-04 NOTE — ED NOTES
I have reviewed discharge instructions with the patient. The patient verbalized understanding. Patient left ED via Discharge Method: ambulatory to Home with self. Opportunity for questions and clarification provided. Patient given 1 scripts. To continue your aftercare when you leave the hospital, you may receive an automated call from our care team to check in on how you are doing. This is a free service and part of our promise to provide the best care and service to meet your aftercare needs.  If you have questions, or wish to unsubscribe from this service please call [phone]. Thank you for Choosing our [address] [place] [address] Emergency Department.           Janet Jackson, RN  11/03/22 [address] [address] Darrion RN  11/03/22 3001

## 2022-11-07 ENCOUNTER — HOSPITAL ENCOUNTER (INPATIENT)
Age: 87
LOS: 2 days | Discharge: HOME | DRG: 372 | End: 2022-11-09
Attending: EMERGENCY MEDICINE | Admitting: FAMILY MEDICINE
Payer: MEDICARE

## 2022-11-07 ENCOUNTER — HOSPITAL ENCOUNTER (INPATIENT)
Dept: GENERAL RADIOLOGY | Age: 87
Discharge: HOME | DRG: 372 | End: 2022-11-10
Payer: MEDICARE

## 2022-11-07 DIAGNOSIS — N17.9 AKI (ACUTE KIDNEY INJURY) (HCC): ICD-10-CM

## 2022-11-07 DIAGNOSIS — R19.7 DIARRHEA, UNSPECIFIED TYPE: Primary | ICD-10-CM

## 2022-11-07 LAB
ALBUMIN SERPL-MCNC: 3 G/DL (ref 3.2–4.6)
ALBUMIN/GLOB SERPL: 0.7 {RATIO} (ref 0.4–1.6)
ALP SERPL-CCNC: 56 U/L (ref 50–136)
ALT SERPL-CCNC: 23 U/L (ref 12–65)
ANION GAP SERPL CALC-SCNC: 7 MMOL/L (ref 2–11)
AST SERPL-CCNC: 11 U/L (ref 15–37)
BASOPHILS # BLD: 0 K/UL (ref 0–0.2)
BASOPHILS NFR BLD: 0 % (ref 0–2)
BILIRUB SERPL-MCNC: 0.7 MG/DL (ref 0.2–1.1)
BUN SERPL-MCNC: 69 MG/DL (ref 8–23)
C. DIFFICILE TOXIN MOLECULAR: NEGATIVE
CALCIUM SERPL-MCNC: 9.8 MG/DL (ref 8.3–10.4)
CHLORIDE SERPL-SCNC: 101 MMOL/L (ref 101–110)
CO2 SERPL-SCNC: 25 MMOL/L (ref 21–32)
CREAT SERPL-MCNC: 2.12 MG/DL (ref 0.8–1.5)
CRP SERPL-MCNC: 13.7 MG/DL (ref 0–0.9)
DIFFERENTIAL METHOD BLD: ABNORMAL
EOSINOPHIL # BLD: 0 K/UL (ref 0–0.8)
EOSINOPHIL NFR BLD: 0 % (ref 0.5–7.8)
ERYTHROCYTE [DISTWIDTH] IN BLOOD BY AUTOMATED COUNT: 13.8 % (ref 11.9–14.6)
ERYTHROCYTE [SEDIMENTATION RATE] IN BLOOD: 17 MM/HR (ref 0–15)
GLOBULIN SER CALC-MCNC: 4.1 G/DL (ref 2.8–4.5)
GLUCOSE SERPL-MCNC: 119 MG/DL (ref 65–100)
HCT VFR BLD AUTO: 33.7 % (ref 41.1–50.3)
HGB BLD-MCNC: 11.2 G/DL (ref 13.6–17.2)
IMM GRANULOCYTES # BLD AUTO: 0.1 K/UL (ref 0–0.5)
IMM GRANULOCYTES NFR BLD AUTO: 1 % (ref 0–5)
LACTATE SERPL-SCNC: 0.9 MMOL/L (ref 0.4–2)
LYMPHOCYTES # BLD: 0.8 K/UL (ref 0.5–4.6)
LYMPHOCYTES NFR BLD: 10 % (ref 13–44)
MAGNESIUM SERPL-MCNC: 2.7 MG/DL (ref 1.8–2.4)
MCH RBC QN AUTO: 27.2 PG (ref 26.1–32.9)
MCHC RBC AUTO-ENTMCNC: 33.2 G/DL (ref 31.4–35)
MCV RBC AUTO: 81.8 FL (ref 82–102)
MONOCYTES # BLD: 1 K/UL (ref 0.1–1.3)
MONOCYTES NFR BLD: 12 % (ref 4–12)
NEUTS SEG # BLD: 6 K/UL (ref 1.7–8.2)
NEUTS SEG NFR BLD: 76 % (ref 43–78)
NRBC # BLD: 0 K/UL (ref 0–0.2)
PLATELET # BLD AUTO: 319 K/UL (ref 150–450)
PMV BLD AUTO: 10.3 FL (ref 9.4–12.3)
POTASSIUM SERPL-SCNC: 4.4 MMOL/L (ref 3.5–5.1)
PROCALCITONIN SERPL-MCNC: 0.68 NG/ML (ref 0–0.49)
PROT SERPL-MCNC: 7.1 G/DL (ref 6.3–8.2)
RBC # BLD AUTO: 4.12 M/UL (ref 4.23–5.6)
SODIUM SERPL-SCNC: 133 MMOL/L (ref 133–143)
WBC # BLD AUTO: 7.8 K/UL (ref 4.3–11.1)

## 2022-11-07 PROCEDURE — 83605 ASSAY OF LACTIC ACID: CPT

## 2022-11-07 PROCEDURE — 2580000003 HC RX 258: Performed by: HOSPITALIST

## 2022-11-07 PROCEDURE — 80053 COMPREHEN METABOLIC PANEL: CPT

## 2022-11-07 PROCEDURE — 74018 RADEX ABDOMEN 1 VIEW: CPT

## 2022-11-07 PROCEDURE — 83735 ASSAY OF MAGNESIUM: CPT

## 2022-11-07 PROCEDURE — 1100000000 HC RM PRIVATE

## 2022-11-07 PROCEDURE — 2580000003 HC RX 258: Performed by: EMERGENCY MEDICINE

## 2022-11-07 PROCEDURE — 85025 COMPLETE CBC W/AUTO DIFF WBC: CPT

## 2022-11-07 PROCEDURE — 87493 C DIFF AMPLIFIED PROBE: CPT

## 2022-11-07 PROCEDURE — 84145 PROCALCITONIN (PCT): CPT

## 2022-11-07 PROCEDURE — 6370000000 HC RX 637 (ALT 250 FOR IP): Performed by: HOSPITALIST

## 2022-11-07 PROCEDURE — 85652 RBC SED RATE AUTOMATED: CPT

## 2022-11-07 PROCEDURE — 99285 EMERGENCY DEPT VISIT HI MDM: CPT

## 2022-11-07 PROCEDURE — 86140 C-REACTIVE PROTEIN: CPT

## 2022-11-07 PROCEDURE — 87077 CULTURE AEROBIC IDENTIFY: CPT

## 2022-11-07 PROCEDURE — 87899 AGENT NOS ASSAY W/OPTIC: CPT

## 2022-11-07 RX ORDER — ASPIRIN 81 MG/1
81 TABLET, CHEWABLE ORAL DAILY
COMMUNITY

## 2022-11-07 RX ORDER — LANOLIN ALCOHOL/MO/W.PET/CERES
1000 CREAM (GRAM) TOPICAL DAILY
Status: DISCONTINUED | OUTPATIENT
Start: 2022-11-08 | End: 2022-11-09 | Stop reason: HOSPADM

## 2022-11-07 RX ORDER — CIPROFLOXACIN 500 MG/1
250 TABLET, FILM COATED ORAL EVERY 12 HOURS SCHEDULED
Status: DISCONTINUED | OUTPATIENT
Start: 2022-11-07 | End: 2022-11-09 | Stop reason: HOSPADM

## 2022-11-07 RX ORDER — SODIUM CHLORIDE, SODIUM LACTATE, POTASSIUM CHLORIDE, CALCIUM CHLORIDE 600; 310; 30; 20 MG/100ML; MG/100ML; MG/100ML; MG/100ML
INJECTION, SOLUTION INTRAVENOUS CONTINUOUS
Status: ACTIVE | OUTPATIENT
Start: 2022-11-07 | End: 2022-11-09

## 2022-11-07 RX ORDER — SACCHAROMYCES BOULARDII 250 MG
500 CAPSULE ORAL 2 TIMES DAILY
Status: DISCONTINUED | OUTPATIENT
Start: 2022-11-07 | End: 2022-11-09 | Stop reason: HOSPADM

## 2022-11-07 RX ORDER — DIPHENOXYLATE HYDROCHLORIDE AND ATROPINE SULFATE 2.5; .025 MG/1; MG/1
1 TABLET ORAL 4 TIMES DAILY PRN
Status: DISCONTINUED | OUTPATIENT
Start: 2022-11-07 | End: 2022-11-09 | Stop reason: HOSPADM

## 2022-11-07 RX ORDER — ACETAMINOPHEN 325 MG/1
650 TABLET ORAL EVERY 6 HOURS PRN
Status: DISCONTINUED | OUTPATIENT
Start: 2022-11-07 | End: 2022-11-09 | Stop reason: HOSPADM

## 2022-11-07 RX ORDER — PANTOPRAZOLE SODIUM 40 MG/1
40 TABLET, DELAYED RELEASE ORAL
Status: DISCONTINUED | OUTPATIENT
Start: 2022-11-08 | End: 2022-11-09 | Stop reason: HOSPADM

## 2022-11-07 RX ORDER — SODIUM CHLORIDE 0.9 % (FLUSH) 0.9 %
5-40 SYRINGE (ML) INJECTION PRN
Status: DISCONTINUED | OUTPATIENT
Start: 2022-11-07 | End: 2022-11-09 | Stop reason: HOSPADM

## 2022-11-07 RX ORDER — MAGNESIUM SULFATE IN WATER 40 MG/ML
2000 INJECTION, SOLUTION INTRAVENOUS PRN
Status: DISCONTINUED | OUTPATIENT
Start: 2022-11-07 | End: 2022-11-09 | Stop reason: HOSPADM

## 2022-11-07 RX ORDER — DOXAZOSIN MESYLATE 1 MG/1
1 TABLET ORAL 2 TIMES DAILY
Status: DISCONTINUED | OUTPATIENT
Start: 2022-11-07 | End: 2022-11-09 | Stop reason: HOSPADM

## 2022-11-07 RX ORDER — TAMSULOSIN HYDROCHLORIDE 0.4 MG/1
0.4 CAPSULE ORAL DAILY
Status: DISCONTINUED | OUTPATIENT
Start: 2022-11-08 | End: 2022-11-09 | Stop reason: HOSPADM

## 2022-11-07 RX ORDER — CHOLESTYRAMINE LIGHT 4 G/5.7G
4 POWDER, FOR SUSPENSION ORAL 2 TIMES DAILY
Status: DISCONTINUED | OUTPATIENT
Start: 2022-11-07 | End: 2022-11-09 | Stop reason: HOSPADM

## 2022-11-07 RX ORDER — METRONIDAZOLE 500 MG/1
500 TABLET ORAL EVERY 8 HOURS SCHEDULED
Status: DISCONTINUED | OUTPATIENT
Start: 2022-11-07 | End: 2022-11-09 | Stop reason: HOSPADM

## 2022-11-07 RX ORDER — ONDANSETRON 2 MG/ML
4 INJECTION INTRAMUSCULAR; INTRAVENOUS EVERY 6 HOURS PRN
Status: DISCONTINUED | OUTPATIENT
Start: 2022-11-07 | End: 2022-11-09 | Stop reason: HOSPADM

## 2022-11-07 RX ORDER — POTASSIUM CHLORIDE 7.45 MG/ML
10 INJECTION INTRAVENOUS PRN
Status: DISCONTINUED | OUTPATIENT
Start: 2022-11-07 | End: 2022-11-09 | Stop reason: HOSPADM

## 2022-11-07 RX ORDER — SODIUM CHLORIDE, SODIUM LACTATE, POTASSIUM CHLORIDE, AND CALCIUM CHLORIDE .6; .31; .03; .02 G/100ML; G/100ML; G/100ML; G/100ML
1000 INJECTION, SOLUTION INTRAVENOUS
Status: COMPLETED | OUTPATIENT
Start: 2022-11-07 | End: 2022-11-07

## 2022-11-07 RX ORDER — ZOLPIDEM TARTRATE 5 MG/1
5 TABLET ORAL NIGHTLY PRN
Status: DISCONTINUED | OUTPATIENT
Start: 2022-11-07 | End: 2022-11-09 | Stop reason: HOSPADM

## 2022-11-07 RX ORDER — ACETAMINOPHEN 650 MG/1
650 SUPPOSITORY RECTAL EVERY 6 HOURS PRN
Status: DISCONTINUED | OUTPATIENT
Start: 2022-11-07 | End: 2022-11-09 | Stop reason: HOSPADM

## 2022-11-07 RX ORDER — SODIUM CHLORIDE 0.9 % (FLUSH) 0.9 %
5-40 SYRINGE (ML) INJECTION EVERY 12 HOURS SCHEDULED
Status: DISCONTINUED | OUTPATIENT
Start: 2022-11-07 | End: 2022-11-09 | Stop reason: HOSPADM

## 2022-11-07 RX ORDER — SODIUM CHLORIDE 9 MG/ML
INJECTION, SOLUTION INTRAVENOUS PRN
Status: DISCONTINUED | OUTPATIENT
Start: 2022-11-07 | End: 2022-11-09 | Stop reason: HOSPADM

## 2022-11-07 RX ORDER — EZETIMIBE 10 MG/1
10 TABLET ORAL NIGHTLY
Status: DISCONTINUED | OUTPATIENT
Start: 2022-11-07 | End: 2022-11-09 | Stop reason: HOSPADM

## 2022-11-07 RX ORDER — ONDANSETRON 4 MG/1
4 TABLET, ORALLY DISINTEGRATING ORAL EVERY 8 HOURS PRN
Status: DISCONTINUED | OUTPATIENT
Start: 2022-11-07 | End: 2022-11-09 | Stop reason: HOSPADM

## 2022-11-07 RX ADMIN — METRONIDAZOLE 500 MG: 500 TABLET ORAL at 16:06

## 2022-11-07 RX ADMIN — SODIUM CHLORIDE, SODIUM LACTATE, POTASSIUM CHLORIDE, AND CALCIUM CHLORIDE: 600; 310; 30; 20 INJECTION, SOLUTION INTRAVENOUS at 16:06

## 2022-11-07 RX ADMIN — CIPROFLOXACIN 250 MG: 500 TABLET, FILM COATED ORAL at 21:42

## 2022-11-07 RX ADMIN — DOXAZOSIN 1 MG: 1 TABLET ORAL at 21:41

## 2022-11-07 RX ADMIN — CHOLESTYRAMINE 4 G: 4 POWDER, FOR SUSPENSION ORAL at 21:41

## 2022-11-07 RX ADMIN — ZOLPIDEM TARTRATE 5 MG: 5 TABLET ORAL at 21:42

## 2022-11-07 RX ADMIN — SODIUM CHLORIDE, PRESERVATIVE FREE 5 ML: 5 INJECTION INTRAVENOUS at 21:47

## 2022-11-07 RX ADMIN — EZETIMIBE 10 MG: 10 TABLET ORAL at 21:42

## 2022-11-07 RX ADMIN — Medication 500 MG: at 21:41

## 2022-11-07 RX ADMIN — METRONIDAZOLE 500 MG: 500 TABLET ORAL at 21:41

## 2022-11-07 RX ADMIN — SODIUM CHLORIDE, POTASSIUM CHLORIDE, SODIUM LACTATE AND CALCIUM CHLORIDE 1000 ML: 600; 310; 30; 20 INJECTION, SOLUTION INTRAVENOUS at 11:27

## 2022-11-07 ASSESSMENT — ENCOUNTER SYMPTOMS
VOMITING: 0
NAUSEA: 0
ABDOMINAL PAIN: 0
DIARRHEA: 1

## 2022-11-07 NOTE — ED NOTES
TRANSFER - OUT REPORT:    Verbal report given to Aleta RN on [employer]  being transferred to  for routine progression of patient care       Report consisted of patient's Situation, Background, Assessment and   Recommendations(SBAR). Information from the following report(s) ED SBAR, Intake/Output, MAR and Recent Results was reviewed with the receiving nurse. Lines:   Peripheral IV 11/07/22 Left Antecubital (Active)        Opportunity for questions and clarification was provided.       Patient transported with:  Florentino Uriostegui RN  11/07/22 [id number]

## 2022-11-07 NOTE — ED TRIAGE NOTES
Pt states that he came in on Thursday for abdominal pain and diarrhea. Pt states he had a negative workup and was given levasin for his pain which has helped but his diarrhea has continued. Pt would like something for the diarrhea. OTC meds have not helped.

## 2022-11-07 NOTE — H&P
improvement in clinical symptoms in next 24 to 48 hours, consult GI for evaluation. Active Problems:    Dyslipidemia  Plan: 11/7-  Continue Zetia      GERD (gastroesophageal reflux disease)  Plan: Protonix 40 mg p.o. daily    PT OT eval      Anticipated discharge needs:   Pending clinical course    Diet: ADULT DIET; Regular; Low Fat/Low Chol/High Fiber/2 gm Na  VTE ppx: SCDs  Code status: Full Code    Hospital Problems:  Principal Problem:    Acute renal failure (ARF) (HCC)  Active Problems:    Acute diarrhea    Dyslipidemia    GERD (gastroesophageal reflux disease)  Resolved Problems:    * No resolved hospital problems.  *       Past History:     Past Medical History:   Diagnosis Date    Adhesive capsulitis of shoulder     Anemia, unspecified     Anxiety state, unspecified     AR (allergic rhinitis)     Arthritis     osteo generalized    CAD (coronary artery disease) 1995    MI, angioplasty    Cancer ([facility] 2002    prostate/xrt    Cerebrovascular disease, unspecified 5/27/2015    Cerumen impaction     Cervicalgia     Chronic ethmoidal sinusitis     Chronic frontal sinusitis     Chronic ischemic heart disease, unspecified     Chronic lymphadenitis     Chronic maxillary sinusitis     Chronic pain     lower back    Coronary atherosclerosis of native coronary artery 5/27/2015    Degeneration of lumbar or lumbosacral intervertebral disc     Deviated septum     ED (erectile dysfunction)     Fatigue     GERD (gastroesophageal reflux disease)     managed with medication     H/O prostate cancer 2002    treated with radiation    H/O seasonal allergies     managed with Nasocort spray    High frequency hearing loss     HLD (hyperlipidemia) 6/4/2014    Hypercholesteremia     managed with medication     Hypertension     controlled with meds    Hypertrophy of nasal turbinates     Loss of weight 8/13/2013    Malignant neoplasm of prostate ([facility] 8/13/2013    MI (myocardial infarction) ([facility] 1995    Nasal obstruction     Neuralgia, neuritis, and radiculitis, unspecified     Night sweat 2013    OA (osteoarthritis)     Obstructive sleep apnea 10/23/2013    sinus surgery corrected sleep apnea    Organic hypersomnia, unspecified 2014    Pain in joint, shoulder region     Prostate cancer [facility])     had radiation 2002    Sleep apnea     SNHL (sensorineural hearing loss)        Past Surgical History:   Procedure Laterality Date    BACK SURGERY      CATARACT REMOVAL Bilateral     bilateral with lens implant    HEENT      S/P SMRITs, Balloon Sinuplasty    HEENT      3 different eye surgeries    HERNIA REPAIR Right 2007    [facility]    LUMBAR LAMINECTOMY  3/2013    Dr. Janeen Núñez      multiple DANNIE    OTHER SURGICAL HISTORY  2016    sinus     [place] CARDIAC SURG PROCEDURE UNLIST  2018    PTCA      angioplasty    SINUS SURGERY PROC UNLISTED  2016    sinus surgery-Dr Sophy Hernandez    TONSILLECTOMY  childhood        Social History     Tobacco Use    Smoking status: Former     Packs/day: 0.25     Types: Cigarettes     Quit date: 2015     Years since quittin.5    Smokeless tobacco: Never    Tobacco comments:     Quit smoking: Pipe and cigar   Substance Use Topics    Alcohol use: Yes      Social History     Substance and Sexual Activity   Drug Use No    Types: OTC, Prescription       Family History   Problem Relation Age of Onset    Stroke Mother     Cancer Sister         breast    Heart Disease Mother     Prostate Cancer Father     Heart Disease Father         Immunization History   Administered Date(s) Administered    COVID-19, MODERNA BLUE border, Primary or Immunocompromised, (age 12y+), IM, 100 mcg/0.5mL 2021, 2021    Influenza Virus Vaccine 10/02/2013, 10/01/2018    Influenza, FLUZONE (age 72 y+), High Dose, 0.7mL 2020    Influenza, High Dose (Fluzone 65 yrs and older) 10/05/2015, 2016, 10/08/2017, 10/01/2019    Pneumococcal Conjugate 13-valent ([id number]) 2015    Pneumococcal Vaccine 01/01/2005    Zoster Live (Zostavax) 10/18/2016     Allergies   Allergen Reactions    Antihistamines, Diphenhydramine-Type      Pt reports prostate problems     Prior to Admit Medications:  Current Outpatient Medications   Medication Instructions    cyanocobalamin 1,000 mcg, Oral, DAILY    doxazosin (CARDURA) 1 mg, Oral, 2 TIMES DAILY    eszopiclone (LUNESTA) 2 mg, Oral, NIGHTLY    Evolocumab with Infusor (REPATHA PUSHTRONEX SYSTEM) 420 MG/3.5ML SOCT EVERY 30 DAYS    ezetimibe (ZETIA) 10 MG tablet TAKE 1 TABLET DAILY    Hyoscyamine Sulfate SL (LEVSIN/SL) 0.125 MG SUBL 1 tablet, SubLINGual, 3 TIMES DAILY PRN    pantoprazole (PROTONIX) 40 MG tablet TAKE 1 TABLET TWICE A DAY (DISCONTINUE DAILY DOSE)    tamsulosin (FLOMAX) 0.4 MG capsule TAKE 1 CAPSULE NIGHTLY    vitamin E 100 Units, Oral, DAILY         Objective:   Patient Vitals for the past 24 hrs:   Temp Pulse Resp BP SpO2   11/07/22 0941 97.7 °F (36.5 °C) 99 18 108/81 96 %       Oxygen Therapy  SpO2: 96 %    Estimated body mass index is 19.37 kg/m² as calculated from the following:    Height as of this encounter: 5' 6\" (1.676 m). Weight as of this encounter: 120 lb (54.4 kg). No intake or output data in the 24 hours ending 11/07/22 1317      Physical Exam:    Blood pressure 108/81, pulse 99, temperature 97.7 °F (36.5 °C), temperature source Oral, resp. rate 18, height 5' 6\" (1.676 m), weight 120 lb (54.4 kg), SpO2 96 %. General:    Alert, awake, elderly, lethargic, on room air  Head:  Normocephalic, atraumatic  Eyes:  Sclerae appear normal.  Pupils equally round. ENT:  Nares appear normal, no drainage. Moist oral mucosa  Neck:  No restricted ROM. Trachea midline   CV:   RRR. No m/r/g. No jugular venous distension. Lungs:   CTAB. No wheezing, rhonchi, or rales. Symmetric expansion. Abdomen: Bowel sounds present. Soft, nontender, nondistended. Extremities: No cyanosis or clubbing. No edema  Skin:     No rashes and normal coloration. Warm and dry. Neuro:  GCS 15, cranial nerves intact, no motor or sensory deficit  Psych:  AOx3, mood and affect appropriate.     I have personally reviewed labs and tests showing:  Recent Labs:  Recent Results (from the past 24 hour(s))   C difficile Molecular/PCR    Collection Time: 11/07/22 10:58 AM    Specimen: Miscellaneous sample   Result Value Ref Range    C. difficile toxin Molecular Negative NEG     CBC with Auto Differential    Collection Time: 11/07/22 11:32 AM   Result Value Ref Range    WBC 7.8 4.3 - 11.1 K/uL    RBC 4.12 (L) 4.23 - 5.6 M/uL    Hemoglobin 11.2 (L) 13.6 - 17.2 g/dL    Hematocrit 33.7 (L) 41.1 - 50.3 %    MCV 81.8 (L) 82.0 - 102.0 FL    MCH 27.2 26.1 - 32.9 PG    MCHC 33.2 31.4 - 35.0 g/dL    RDW 13.8 11.9 - 14.6 %    Platelets 278 876 - 004 K/uL    MPV 10.3 9.4 - 12.3 FL    nRBC 0.00 0.0 - 0.2 K/uL    Differential Type AUTOMATED      Seg Neutrophils 76 43 - 78 %    Lymphocytes 10 (L) 13 - 44 %    Monocytes 12 4.0 - 12.0 %    Eosinophils % 0 (L) 0.5 - 7.8 %    Basophils 0 0.0 - 2.0 %    Immature Granulocytes 1 0.0 - 5.0 %    Segs Absolute 6.0 1.7 - 8.2 K/UL    Absolute Lymph # 0.8 0.5 - 4.6 K/UL    Absolute Mono # 1.0 0.1 - 1.3 K/UL    Absolute Eos # 0.0 0.0 - 0.8 K/UL    Basophils Absolute 0.0 0.0 - 0.2 K/UL    Absolute Immature Granulocyte 0.1 0.0 - 0.5 K/UL   Comprehensive Metabolic Panel    Collection Time: 11/07/22 11:32 AM   Result Value Ref Range    Sodium 133 133 - 143 mmol/L    Potassium 4.4 3.5 - 5.1 mmol/L    Chloride 101 101 - 110 mmol/L    CO2 25 21 - 32 mmol/L    Anion Gap 7 2 - 11 mmol/L    Glucose 119 (H) 65 - 100 mg/dL    BUN 69 (H) 8 - 23 MG/DL    Creatinine 2.12 (H) 0.8 - 1.5 MG/DL    Est, Glom Filt Rate 30 (L) >60 ml/min/1.73m2    Calcium 9.8 8.3 - 10.4 MG/DL    Total Bilirubin 0.7 0.2 - 1.1 MG/DL    ALT 23 12 - 65 U/L    AST 11 (L) 15 - 37 U/L    Alk Phosphatase 56 50 - 136 U/L    Total Protein 7.1 6.3 - 8.2 g/dL    Albumin 3.0 (L) 3.2 - 4.6 g/dL    Globulin 4.1 2.8 - 4.5 g/dL Albumin/Globulin Ratio 0.7 0.4 - 1.6     Magnesium    Collection Time: 11/07/22 11:32 AM   Result Value Ref Range    Magnesium 2.7 (H) 1.8 - 2.4 mg/dL   C-Reactive Protein    Collection Time: 11/07/22 11:32 AM   Result Value Ref Range    CRP 13.7 (H) 0.0 - 0.9 mg/dL       I have personally reviewed imaging studies showing:  No results found. Echocardiogram:  No results found for this or any previous visit. Orders Placed This Encounter   Medications    lactated ringers bolus    vitamin B-12 (CYANOCOBALAMIN) tablet 1,000 mcg    doxazosin (CARDURA) tablet 1 mg    zolpidem (AMBIEN) tablet 5 mg    ezetimibe (ZETIA) tablet 10 mg    Hyoscyamine Sulfate SL SUBL 1 tablet    pantoprazole (PROTONIX) tablet 40 mg    tamsulosin (FLOMAX) capsule 0.4 mg    sodium chloride flush 0.9 % injection 5-40 mL    sodium chloride flush 0.9 % injection 5-40 mL    0.9 % sodium chloride infusion    OR Linked Order Group     ondansetron (ZOFRAN-ODT) disintegrating tablet 4 mg     ondansetron (ZOFRAN) injection 4 mg    OR Linked Order Group     acetaminophen (TYLENOL) tablet 650 mg     acetaminophen (TYLENOL) suppository 650 mg    magnesium sulfate 2000 mg in 50 mL IVPB premix    potassium chloride 10 mEq/100 mL IVPB (Peripheral Line)    cholestyramine light packet 4 g    lactated ringers infusion    saccharomyces boulardii (FLORASTOR) capsule 500 mg    metroNIDAZOLE (FLAGYL) tablet 500 mg     Order Specific Question:   Antimicrobial Indications     Answer: Other     Order Specific Question:   Other Abx Indication     Answer:   infectious diarrhea     Order Specific Question:   Suspected Organism(s)     Answer:   anaerobics    ciprofloxacin (CIPRO) tablet 250 mg     Order Specific Question:   Antimicrobial Indications     Answer:    Other     Order Specific Question:   Other Abx Indication     Answer:   infectious diarrhea     Order Specific Question:   Suspected Organism(s)     Answer:   GNR    diphenoxylate-atropine (LOMOTIL) 2.5-0.025 MG per tablet 1 tablet         Signed:  Alisha Malik MD    Part of this note may have been written by using a voice dictation software. The note has been proof read but may still contain some grammatical/other typographical errors.

## 2022-11-07 NOTE — CARE COORDINATION
Case Management Assessment  Initial Evaluation    Date/Time of Evaluation: 11/7/2022 2:57 PM  Assessment Completed by: Amaury De La Cruz RN    If patient is discharged prior to next notation, then this note serves as note for discharge by case management. Patient Name: Cori Agudelo                   YOB: 1935  Diagnosis: Acute renal failure (ARF) ([facility] [address] [N17.9]                   Date / Time: 11/7/2022  9:43 AM    Patient Admission Status: Inpatient   Readmission Risk (Low < 19, Mod (19-27), High > 27): No data recorded  Current PCP: William Bennett MD  PCP verified by CM? (P) Yes    Chart Reviewed: Yes      History Provided by: (P) Patient  Patient Orientation: (P) Alert and Oriented    Patient Cognition: (P) Alert    Hospitalization in the last 30 days (Readmission):  No    If yes, Readmission Assessment in CM Navigator will be completed.     Advance Directives:      Code Status: Full Code   Patient's Primary Decision Maker is: (P) Named in Scanned ACP Document      Discharge Planning:    Patient lives with:   Type of Home:    Primary Care Giver:    Patient Support Systems include: (P) Children   Current Financial resources: (P) Medicare  Current community resources: (P)  (none)  Current services prior to admission:              Current DME:              Type of Home Care services:       ADLS  Prior functional level: (P) Independent in ADLs/IADLs  Current functional level: (P) Independent in ADLs/IADLs    PT AM-PAC:   /24  OT AM-PAC:   /24    Family can provide assistance at DC: (P) No (He is primary caregiver for his wife/Sienna who has Alzheimers.)  Would you like Case Management to discuss the discharge plan with any other family members/significant others, and if so, who? (P) No  Plans to Return to Present Housing: (P) Yes  Other Identified Issues/Barriers to RETURNING to current housing:   Potential Assistance needed at discharge:              Potential DME:    Patient expects to discharge to: (P) House  Plan for transportation at discharge: (P) Family    Financial    Payor: MEDICARE / Plan: MEDICARE PART A AND B / Product Type: *No Product type* /     Does insurance require precert for SNF: No    Potential assistance Purchasing Medications:    Meds-to-Beds request:        [address] [phone] - F [phone]  [address] [facility] [address]  Phone: [phone] Fax: [address] [place] [address] [phone] [place], [place] - [address] [place] [address] [phone] [place] [phone]  [address] [place] [id number]  Phone: [phone] Fax: [phone]      Notes:    Factors facilitating achievement of predicted outcomes: Family support, Cooperative, Pleasant, Sense of humor, and Home is wheelchair accessible    Barriers to discharge: Additional Case Management Notes: Pt staes his concerns for admission b/c he is primary caregiver for his wife/Sienna adrian/ Alzheimers. She cannot be left alone, dtr/Angelic at bedside, pt also has grand-dtr who will is also local. Pt states he has a lift-chair and walker for his wife, he is inde and drives. PCP is current and Rx are filled at [address] or at [place] [facility] . Per MD pt may be ready for d/c on 11/10. PT/OT/PPD  ordered and CM to follow. The Plan for Transition of Care is related to the following treatment goals of Acute renal failure (ARF) ([facility] [address] [D89.0]    IF APPLICABLE: The Patient and/or patient representative Kaitlin Brock and his family were provided with a choice of provider and agrees with the discharge plan. Freedom of choice list with basic dialogue that supports the patient's individualized plan of care/goals and shares the quality data associated with the providers was provided to: (P)  (will provide if needed)   Patient Representative Name:       The Patient and/or Patient Representative Agree with the Discharge Plan?       Rosa CUNNINGHAM

## 2022-11-07 NOTE — ED PROVIDER NOTES
Emergency Department Provider Note                   PCP:                Miriam Fitzgerald MD               Age: 81 y.o. Sex: male     No diagnosis found. DISPOSITION          MDM     Amount and/or Complexity of Data Reviewed  Clinical lab tests: ordered and reviewed  Tests in the radiology section of CPT®: reviewed  Review and summarize past medical records: yes  Independent visualization of images, tracings, or specimens: yes    Risk of Complications, Morbidity, and/or Mortality  Presenting problems: moderate  Diagnostic procedures: moderate  Management options: moderate    Patient Progress  Patient progress: stable             Orders Placed This Encounter   Procedures    Culture, Stool    Clostridium Difficile Toxin/Antigen    CBC with Auto Differential    Comprehensive Metabolic Panel    Magnesium        Medications   lactated ringers bolus (has no administration in time range)       New Prescriptions    No medications on file        Fredo Amato is a 81 y.o. male who presents to the Emergency Department with chief complaint of  No chief complaint on file. Patient presents to the emergency department with complaints of diarrhea. He states this has been present since at least about 3 November, 4 to 5 days ago. He was seen in the emergency department on the third with abdominal pain as well as the diarrhea. Onset of symptoms was 4 to 5 days ago. Evaluation in the emergency department at that time was unremarkable including a CAT scan. He states he was prescribed Levsin and has been taking that and the abdominal pain has resolved however the diarrhea has been persistent. He reports no fever or chills. No recent antibiotic use or sick contacts. He has been using Imodium without relief. He reports no hematochezia. He denies nausea or vomiting. The history is provided by the patient and medical records. Review of Systems   Constitutional:  Negative for chills and fever. Gastrointestinal:  Positive for diarrhea. Negative for abdominal pain, nausea and vomiting. All other systems reviewed and are negative.     Past Medical History:   Diagnosis Date    Adhesive capsulitis of shoulder     Anemia, unspecified     Anxiety state, unspecified     AR (allergic rhinitis)     Arthritis     osteo generalized    CAD (coronary artery disease) 1995    MI, angioplasty    Cancer ([facility] [address] 2002    prostate/xrt    Cerebrovascular disease, unspecified 5/27/2015    Cerumen impaction     Cervicalgia     Chronic ethmoidal sinusitis     Chronic frontal sinusitis     Chronic ischemic heart disease, unspecified     Chronic lymphadenitis     Chronic maxillary sinusitis     Chronic pain     lower back    Coronary atherosclerosis of native coronary artery 5/27/2015    Degeneration of lumbar or lumbosacral intervertebral disc     Deviated septum     ED (erectile dysfunction)     Fatigue     GERD (gastroesophageal reflux disease)     managed with medication     H/O prostate cancer 2002    treated with radiation    H/O seasonal allergies     managed with Nasocort spray    High frequency hearing loss     HLD (hyperlipidemia) 6/4/2014    Hypercholesteremia     managed with medication     Hypertension     controlled with meds    Hypertrophy of nasal turbinates     Loss of weight 8/13/2013    Malignant neoplasm of prostate ([facility] [address] 8/13/2013    MI (myocardial infarction) ([facility] [address] 1995    Nasal obstruction     Neuralgia, neuritis, and radiculitis, unspecified     Night sweat 8/19/2013    OA (osteoarthritis)     Obstructive sleep apnea 10/23/2013    sinus surgery corrected sleep apnea    Organic hypersomnia, 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 Andry Maldonado      multiple DANNIE    OTHER SURGICAL HISTORY  2016    sinus     [place] CARDIAC SURG PROCEDURE UNLIST  2018    PTCA  1995    angioplasty    SINUS SURGERY PROC UNLISTED  2016    sinus surgery-Dr Poli Jenkins    TONSILLECTOMY  childhood        Family History   Problem Relation Age of Onset    Stroke Mother     Cancer Sister         breast    Heart Disease Mother     Prostate Cancer Father     Heart Disease Father         Social History     Socioeconomic History    Marital status:      Spouse name: None    Number of children: None    Years of education: None    Highest education level: None   Tobacco Use    Smoking status: Former     Packs/day: 0.25     Types: Cigarettes     Quit date: 2015     Years since quittin.5    Smokeless tobacco: Never    Tobacco comments:     Quit smoking: Pipe and cigar   Substance and Sexual Activity    Alcohol use: Yes    Drug use: No     Types: OTC, Prescription         Antihistamines, diphenhydramine-type     Previous Medications    CYANOCOBALAMIN 1000 MCG TABLET    Take 1,000 mcg by mouth daily    DOXAZOSIN (CARDURA) 1 MG TABLET    Take 1 mg by mouth 2 times daily    ESZOPICLONE (LUNESTA) 2 MG TABS    Take 1 tablet by mouth nightly for 90 days. EVOLOCUMAB WITH INFUSOR ([address] [place] [address]) 420 MG/3.5ML SOCT    every 30 days    EZETIMIBE (ZETIA) 10 MG TABLET    TAKE 1 TABLET DAILY    HYOSCYAMINE SULFATE SL (LEVSIN/SL) 0.125 MG SUBL    Place 1 tablet under the tongue 3 times daily as needed (abd pain)    PANTOPRAZOLE (PROTONIX) 40 MG TABLET    TAKE 1 TABLET TWICE A DAY (DISCONTINUE DAILY DOSE)    TAMSULOSIN (FLOMAX) 0.4 MG CAPSULE    TAKE 1 CAPSULE NIGHTLY    VITAMIN E 100 UNITS CAPSULE    Take 100 Units by mouth daily        Vitals signs and nursing note reviewed.    Patient Vitals for the past 4 hrs:   Temp Pulse Resp BP SpO2   11/07/22 0941 97.7 °F (36.5 °C) 99 18 108/81 96 %          Physical Exam  Vitals and nursing note reviewed. Constitutional:       General: He is not in acute distress. Appearance: Normal appearance. He is not ill-appearing, toxic-appearing or diaphoretic. HENT:      Head: Normocephalic and atraumatic. Mouth/Throat:      Mouth: Mucous membranes are dry. Pharynx: No oropharyngeal exudate or posterior oropharyngeal erythema. Eyes:      Extraocular Movements: Extraocular movements intact. Conjunctiva/sclera: Conjunctivae normal.      Pupils: Pupils are equal, round, and reactive to light. Cardiovascular:      Rate and Rhythm: Normal rate and regular rhythm. Pulses: Normal pulses. Pulmonary:      Effort: Pulmonary effort is normal.      Breath sounds: Normal breath sounds. Abdominal:      General: There is no distension. Palpations: Abdomen is soft. Tenderness: There is no abdominal tenderness. There is no right CVA tenderness, left CVA tenderness or guarding. Musculoskeletal:      Cervical back: Normal range of motion and neck supple. Skin:     General: Skin is warm and dry. Capillary Refill: Capillary refill takes less than 2 seconds. Neurological:      General: No focal deficit present. Mental Status: He is alert and oriented to person, place, and time. Mental status is at baseline. Psychiatric:         Mood and Affect: Mood normal.         Behavior: Behavior normal.         Thought Content: Thought content normal.        Procedures    No results found for any visits on 11/07/22. No orders to display                       Voice dictation software was used during the making of this note. This software is not perfect and grammatical and other typographical errors may be present. This note has not been completely proofread for errors.      Dunia Goode DO  11/07/22 1013

## 2022-11-07 NOTE — PROGRESS NOTES
TRANSFER - IN REPORT:    Verbal report received from Samantha(name) on Silver Crease  being received from ED(unit) for routine progression of care      Report consisted of patient's Situation, Background, Assessment and   Recommendations(SBAR). Information from the following report(s) SBAR, ED Summary, MAR, and Recent Results was reviewed with the receiving nurse. Opportunity for questions and clarification was provided. Assessment completed upon patient's arrival to unit and care assumed.

## 2022-11-08 LAB
ALBUMIN SERPL-MCNC: 2.2 G/DL (ref 3.2–4.6)
ALBUMIN/GLOB SERPL: 0.7 {RATIO} (ref 0.4–1.6)
ALP SERPL-CCNC: 44 U/L (ref 50–136)
ALT SERPL-CCNC: 16 U/L (ref 12–65)
ANION GAP SERPL CALC-SCNC: 8 MMOL/L (ref 2–11)
AST SERPL-CCNC: 10 U/L (ref 15–37)
BASOPHILS # BLD: 0 K/UL (ref 0–0.2)
BASOPHILS NFR BLD: 1 % (ref 0–2)
BILIRUB SERPL-MCNC: 0.6 MG/DL (ref 0.2–1.1)
BUN SERPL-MCNC: 43 MG/DL (ref 8–23)
CALCIUM SERPL-MCNC: 8.6 MG/DL (ref 8.3–10.4)
CHLORIDE SERPL-SCNC: 106 MMOL/L (ref 101–110)
CO2 SERPL-SCNC: 24 MMOL/L (ref 21–32)
CREAT SERPL-MCNC: 1.27 MG/DL (ref 0.8–1.5)
DIFFERENTIAL METHOD BLD: ABNORMAL
EOSINOPHIL # BLD: 0 K/UL (ref 0–0.8)
EOSINOPHIL NFR BLD: 1 % (ref 0.5–7.8)
ERYTHROCYTE [DISTWIDTH] IN BLOOD BY AUTOMATED COUNT: 13.9 % (ref 11.9–14.6)
GLOBULIN SER CALC-MCNC: 3 G/DL (ref 2.8–4.5)
GLUCOSE SERPL-MCNC: 104 MG/DL (ref 65–100)
HCT VFR BLD AUTO: 26.6 % (ref 41.1–50.3)
HGB BLD-MCNC: 9 G/DL (ref 13.6–17.2)
IMM GRANULOCYTES # BLD AUTO: 0 K/UL (ref 0–0.5)
IMM GRANULOCYTES NFR BLD AUTO: 1 % (ref 0–5)
LYMPHOCYTES # BLD: 1.1 K/UL (ref 0.5–4.6)
LYMPHOCYTES NFR BLD: 23 % (ref 13–44)
MCH RBC QN AUTO: 27.5 PG (ref 26.1–32.9)
MCHC RBC AUTO-ENTMCNC: 33.8 G/DL (ref 31.4–35)
MCV RBC AUTO: 81.3 FL (ref 82–102)
MONOCYTES # BLD: 0.6 K/UL (ref 0.1–1.3)
MONOCYTES NFR BLD: 12 % (ref 4–12)
NEUTS SEG # BLD: 2.9 K/UL (ref 1.7–8.2)
NEUTS SEG NFR BLD: 62 % (ref 43–78)
NRBC # BLD: 0 K/UL (ref 0–0.2)
PLATELET # BLD AUTO: 256 K/UL (ref 150–450)
PLATELET COMMENT: ADEQUATE
PMV BLD AUTO: 10 FL (ref 9.4–12.3)
POTASSIUM SERPL-SCNC: 3.6 MMOL/L (ref 3.5–5.1)
PROT SERPL-MCNC: 5.2 G/DL (ref 6.3–8.2)
RBC # BLD AUTO: 3.27 M/UL (ref 4.23–5.6)
SODIUM SERPL-SCNC: 138 MMOL/L (ref 133–143)
WBC # BLD AUTO: 4.6 K/UL (ref 4.3–11.1)
WBC MORPH BLD: ABNORMAL

## 2022-11-08 PROCEDURE — 80053 COMPREHEN METABOLIC PANEL: CPT

## 2022-11-08 PROCEDURE — 1100000000 HC RM PRIVATE

## 2022-11-08 PROCEDURE — 36415 COLL VENOUS BLD VENIPUNCTURE: CPT

## 2022-11-08 PROCEDURE — 97161 PT EVAL LOW COMPLEX 20 MIN: CPT

## 2022-11-08 PROCEDURE — 85025 COMPLETE CBC W/AUTO DIFF WBC: CPT

## 2022-11-08 PROCEDURE — 97530 THERAPEUTIC ACTIVITIES: CPT

## 2022-11-08 PROCEDURE — 2580000003 HC RX 258: Performed by: HOSPITALIST

## 2022-11-08 PROCEDURE — 97165 OT EVAL LOW COMPLEX 30 MIN: CPT

## 2022-11-08 PROCEDURE — 6370000000 HC RX 637 (ALT 250 FOR IP): Performed by: HOSPITALIST

## 2022-11-08 RX ADMIN — CHOLESTYRAMINE 4 G: 4 POWDER, FOR SUSPENSION ORAL at 20:35

## 2022-11-08 RX ADMIN — TAMSULOSIN HYDROCHLORIDE 0.4 MG: 0.4 CAPSULE ORAL at 09:23

## 2022-11-08 RX ADMIN — Medication 1000 MCG: at 09:23

## 2022-11-08 RX ADMIN — EZETIMIBE 10 MG: 10 TABLET ORAL at 20:35

## 2022-11-08 RX ADMIN — METRONIDAZOLE 500 MG: 500 TABLET ORAL at 14:40

## 2022-11-08 RX ADMIN — DOXAZOSIN 1 MG: 1 TABLET ORAL at 09:23

## 2022-11-08 RX ADMIN — METRONIDAZOLE 500 MG: 500 TABLET ORAL at 20:35

## 2022-11-08 RX ADMIN — CIPROFLOXACIN 250 MG: 500 TABLET, FILM COATED ORAL at 09:24

## 2022-11-08 RX ADMIN — PANTOPRAZOLE SODIUM 40 MG: 40 TABLET, DELAYED RELEASE ORAL at 06:21

## 2022-11-08 RX ADMIN — DOXAZOSIN 1 MG: 1 TABLET ORAL at 20:35

## 2022-11-08 RX ADMIN — CIPROFLOXACIN 250 MG: 500 TABLET, FILM COATED ORAL at 20:35

## 2022-11-08 RX ADMIN — CHOLESTYRAMINE 4 G: 4 POWDER, FOR SUSPENSION ORAL at 09:25

## 2022-11-08 RX ADMIN — METRONIDAZOLE 500 MG: 500 TABLET ORAL at 06:21

## 2022-11-08 RX ADMIN — ZOLPIDEM TARTRATE 5 MG: 5 TABLET ORAL at 20:36

## 2022-11-08 RX ADMIN — Medication 500 MG: at 20:35

## 2022-11-08 RX ADMIN — Medication 500 MG: at 09:24

## 2022-11-08 RX ADMIN — SODIUM CHLORIDE, PRESERVATIVE FREE 5 ML: 5 INJECTION INTRAVENOUS at 09:25

## 2022-11-08 ASSESSMENT — PAIN SCALES - GENERAL: PAINLEVEL_OUTOF10: 0

## 2022-11-08 NOTE — PROGRESS NOTES
OCCUPATIONAL THERAPY Initial Assessment, Discharge, and AM       OT Visit Days: 1  Acknowledge Orders  Time  OT Charge Capture  Rehab Caseload Tracker      German Marie is a 81 y.o. male   PRIMARY DIAGNOSIS: Acute renal failure (ARF) ([facility] [address]  Acute renal failure (ARF) (HCC) [N17.9]  LUIS MIGUEL (acute kidney injury) ([facility] [address] [N17.9]  Diarrhea, unspecified type [R19.7]       Reason for Referral: Generalized Muscle Weakness (M62.81)  Other lack of cordination (R27.8)  Difficulty in walking, Not elsewhere classified (R26.2)  Other abnormalities of gait and mobility (R26.89)  Inpatient: Payor: MEDICARE / Plan: MEDICARE PART A AND B / Product Type: *No Product type* /     ASSESSMENT:     REHAB RECOMMENDATIONS:   Recommendation to date pending progress:  Setting:  No further skilled therapy after discharge from hospital    Equipment:    None     ASSESSMENT:  Mr. Roland supine in bed, he has been getting himself up to the bathroom by himself. He was supervision supine to sit. He was Sba to move around the room  OT pushed the IV pole. He was supervision for upper and lower body dressing. He got in and out of bed with supervision. He was set up in the recliner with all his needs. He lives with his wife and he is her caregiver. They live in a split level home with lifts for both sets of stairs. He has 3 steps to enter his home with B rails. He has a tub shower combo and said they used to have a shower chair but they don't use it. He plans to discharge home tomorrow. He expressed no concerns and reported he feels so much better today. No skilled OT indicated at this time. Discharge OT.      [address] [facility] [address] [address] AM-PAC 6 Clicks Daily Activity Inpatient Short Form:    AM-PAC Daily Activity Inpatient   How much help for putting on and taking off regular lower body clothing?: A Little  How much help for Bathing?: A Little  How much help for Toileting?: A Little  How much help for putting on and taking off regular upper body clothing?: None  How much help for taking care of personal grooming?: None  How much help for eating meals?: None  AM-PAC Inpatient Daily Activity Raw Score: 21  AM-PAC Inpatient ADL T-Scale Score : 44.27  ADL Inpatient CMS 0-100% Score: 32.79  ADL Inpatient CMS G-Code Modifier : CJ           SUBJECTIVE:     Mr. Roland stated he feels so much better    Social/Functional Lives With: Spouse  Type of Home: House  Home Layout: Multi-level, Able to Live on Main level with bedroom/bathroom (has chair-lift)  Home Equipment: Coleman Maradiaga, standard  ADL Assistance: Independent  Ambulation Assistance: Independent  Transfer Assistance: Independent  Active : Yes  Mode of Transportation: Car  Occupation: Retired    OBJECTIVE:     Tellis Merlin / Amanda Garcia / Ely Law: IV    RESTRICTIONS/PRECAUTIONS:     fall  PAIN: Tristan Velezjeffersond / O2:   Pre Treatment:      0/10    Post Treatment: 0/10       Vitals          Oxygen            GROSS EVALUATION: INTACT IMPAIRED   (See Comments)   UE AROM [] []L shoulder RTC injury   UE PROM [] []   Strength [x]       Posture / Balance []  Sba in standing   Sensation []     Coordination []  SBA in standing     Tone []       Edema []    Activity Tolerance []  Fair with mobility     Hand Dominance R [x] L []      COGNITION/  PERCEPTION: INTACT IMPAIRED   (See Comments)   Orientation []     Vision []     Hearing []     Cognition  []     Perception []       MOBILITY: I Mod I S SBA CGA Min Mod Max Total  NT x2 Comments:   Bed Mobility    Rolling [] [] [] [] [] [] [] [] [] [] []    Supine to Sit [] [] [x] [] [] [] [] [] [] [] []    Scooting [] [] [x] [] [] [] [] [] [] [] []    Sit to Supine [] [] [x] [] [] [] [] [] [] [] []    Transfers    Sit to Stand [] [] [x] [] [] [] [] [] [] [] []    Bed to Chair [] [] [] [x] [] [] [] [] [] [] []    Stand to Sit [] [] [x] [] [] [] [] [] [] [] []    Tub/Shower [] [] [] [] [] [] [] [] [] [] []     Toilet [] [] []  [] [] [] [] [] [] []      [] [] [] [] [] [] [] [] [] [] [] I=Independent, Mod I=Modified Independent, S=Supervision/Setup, SBA=Standby Assistance, CGA=Contact Guard Assistance, Min=Minimal Assistance, Mod=Moderate Assistance, Max=Maximal Assistance, Total=Total Assistance, NT=Not Tested    ACTIVITIES OF DAILY LIVING: I Mod I S SBA CGA Min Mod Max Total NT Comments   BASIC ADLs:              Upper Body Bathing  [] [] [] [] [] [] [] [] [] []    Lower Body Bathing [] [] [] [] [] [] [] [] [] []    Toileting [] [] [] [] [] [] [] [] [] [] Supervision with ADLS and SBA for mobility    Upper Body Dressing [] [] [] [] [] [] [] [] [] []    Lower Body Dressing [] [] [] [] [] [] [] [] [] []    Feeding [] [] [] [] [] [] [] [] [] []    Grooming [] [] [] [] [] [] [] [] [] []    Personal Device Care [] [] [] [] [] [] [] [] [] []    Functional Mobility [] [] [] [x] [] [] [] [] [] []    I=Independent, Mod I=Modified Independent, S=Supervision/Setup, SBA=Standby Assistance, CGA=Contact Guard Assistance, Min=Minimal Assistance, Mod=Moderate Assistance, Max=Maximal Assistance, Total=Total Assistance, NT=Not Tested    PLAN:        TREATMENT:     EVALUATION: LOW COMPLEXITY: (Untimed Charge)       AFTER TREATMENT PRECAUTIONS: Bed/Chair Locked, Call light within reach, Chair, Needs within reach, RN notified, and CNA notified    INTERDISCIPLINARY COLLABORATION:  RN/ PCT, PT/ PTA, and OT/ ESCOTO    EDUCATION:  Education Given To: Patient  Education Provided: Role of Therapy;Plan of Care;Transfer Training;Equipment; Fall Prevention Strategies  Education Method: Demonstration;Verbal  Barriers to Learning: None  Education Outcome: Verbalized understanding;Demonstrated understanding    TOTAL TREATMENT DURATION AND TIME:  Time In: 1020  Time Out: [address]  Minutes: [address], OT

## 2022-11-08 NOTE — PROGRESS NOTES
ACUTE PHYSICAL THERAPY GOALS:   (Developed with and agreed upon by patient and/or caregiver.)  STG:  (1.)[facility] will move from supine to sit and sit to supine  with INDEPENDENT within 1-2 treatment day(s). (2.)Mr. [facility] [place] [facility] will transfer from bed to chair and chair to bed with INDEPENDENT using the least restrictive device within 1-2 treatment day(s). (3.)[facility] will ambulate with INDEPENDENT for  feet with the least restrictive device within 1-2 treatment day(s). LTG:  (1.)Mr. [facility] will ambulate with INDEPENDENT for 100-125 feet with the least restrictive device within 3-5 treatment day(s). ________________________________________________________________________________________________        PHYSICAL THERAPY Initial Assessment, Daily Note, and AM  (Link to Caseload Tracking: PT Visit Days : 1  Acknowledge Orders  Time In/Out  PT Charge Capture  Rehab Caseload Tracker    Verónica Ayala is a 81 y.o. male   PRIMARY DIAGNOSIS: Acute renal failure (ARF) ([address]  Acute renal failure (ARF) ([address] [N17.9]  LUIS MIGUEL (acute kidney injury) ([facility] [N17.9]  Diarrhea, unspecified type [R19.7]       Reason for Referral: Difficulty in walking, Not elsewhere classified (R26.2)  Other abnormalities of gait and mobility (R26.89)  Inpatient: Payor: MEDICARE / Plan: MEDICARE PART A AND B / Product Type: *No Product type* /     ASSESSMENT:     REHAB RECOMMENDATIONS:   Recommendation to date pending progress:  Setting:  Home Health Therapy    Equipment:    None     ASSESSMENT:  [facility] is admitted with above diagnosis. Pt supine on arrival. Pt performed supine to sit to stand. Pt ambulated in room. Pt in bedside chair with needs in reach. Pt will benefit from PT services to maximize independence with functional mobility.      [address] [facility] [address] [address] AM-PAC 6 Clicks Basic Mobility Inpatient Short Form  AM-PAC Mobility Inpatient   How much difficulty turning over in bed?: None  How much difficulty sitting down on / standing up from a chair with arms?: None  How much difficulty moving from lying on back to sitting on side of bed?: None  How much help from another person moving to and from a bed to a chair?: None  How much help from another person needed to walk in hospital room?: None  How much help from another person for climbing 3-5 steps with a railing?: None  AM-PAC Inpatient Mobility Raw Score : 24  AM-PAC Inpatient T-Scale Score : 61.14  Mobility Inpatient CMS 0-100% Score: 0  Mobility Inpatient CMS G-Code Modifier : CH    SUBJECTIVE:   Mr. Roland states, [place] [employer]. \" Pt agreeable to therapy, despite feeling we \"messed up his room. \"     Social/Functional Lives With: Spouse  Type of Home: House  Home Layout: Multi-level, Able to Live on Main level with bedroom/bathroom (has chair-lift)  Home Equipment: [employer], standard  ADL Assistance: Independent  Ambulation Assistance: Independent  Transfer Assistance: Independent  Active : Yes  Mode of Transportation: Car  Occupation: Retired    OBJECTIVE:     PAIN: VITALS / O2: PRECAUTION / Mayela Paramjit / Redia Ackerman:   Pre Treatment:   Pain Assessment: None - Denies Pain      Post Treatment: no complaints Vitals        Oxygen      IV    RESTRICTIONS/PRECAUTIONS:  Restrictions/Precautions: None                 GROSS EVALUATION: Intact Impaired (Comments):   AROM [x]     PROM []    Strength [x]     Balance [x]     Posture [] N/A   Sensation [x]     Coordination [x]      Tone [x]     Edema []    Activity Tolerance [] Patient limited by fatigue    []      COGNITION/  PERCEPTION: Intact Impaired (Comments):   Orientation [x]     Vision [x]     Hearing [x]     Cognition  [x]       MOBILITY: I Mod I S SBA CGA Min Mod Max Total  NT x2 Comments:   Bed Mobility    Rolling [] [] [] [] [] [] [] [] [] [] []    Supine to Sit [] [] [] [x] [] [] [] [] [] [] []    Scooting [] [] [] [] [] [] [] [] [] [] []    Sit to Supine [] [] [] [x] [] [] [] [] [] [] []    Transfers    Sit to Stand [] [] [] [x] [] [] [] [] [] [] []    Bed to Chair [] [] [] [] [] [] [] [] [] [] []    Stand to Sit [] [] [] [x] [] [] [] [] [] [] []     [] [] [] [] [] [] [] [] [] [] []    I=Independent, Mod I=Modified Independent, S=Supervision, SBA=Standby Assistance, CGA=Contact Guard Assistance,   Min=Minimal Assistance, Mod=Moderate Assistance, Max=Maximal Assistance, Total=Total Assistance, NT=Not Tested    GAIT: I Mod I S SBA CGA Min Mod Max Total  NT x2 Comments:   Level of Assistance [] [] [] [x] [] [] [] [] [] [] []    Distance 45 feet    DME None    Gait Quality Slightly unsteady gait    Weightbearing Status Restrictions/Precautions  Restrictions/Precautions: None    Stairs      I=Independent, Mod I=Modified Independent, S=Supervision, SBA=Standby Assistance, CGA=Contact Guard Assistance,   Min=Minimal Assistance, Mod=Moderate Assistance, Max=Maximal Assistance, Total=Total Assistance, NT=Not Tested    PLAN:   FREQUENCY AND DURATION: Daily for duration of hospital stay or until stated goals are met, whichever comes first.    THERAPY PROGNOSIS: Good    PROBLEM LIST:   (Skilled intervention is medically necessary to address:)  Decreased Activity Tolerance  Decreased Balance  Decreased Cognition  Decreased Gait Ability  Decreased Transfer Abilities INTERVENTIONS PLANNED:   (Benefits and precautions of physical therapy have been discussed with the patient.)  Gait Training  Education       TREATMENT:   EVALUATION: LOW COMPLEXITY: (Untimed Charge)    TREATMENT:   Therapeutic Activity (20 Minutes): Therapeutic activity included Transfer Training, Sitting balance , and Standing balance to improve functional Activity tolerance, Balance, and Mobility.     TREATMENT GRID:  N/A    AFTER TREATMENT PRECAUTIONS: Bed/Chair Locked, Call light within reach, Chair, Needs within reach, and RN notified    INTERDISCIPLINARY COLLABORATION:  RN/ PCT, PT/ PTA, and OT/ ESCOTO    EDUCATION: Education Given To: Patient  Education Provided: Role of Therapy;Home Exercise

## 2022-11-08 NOTE — CONSULTS
Comprehensive Nutrition Assessment    Type and Reason for Visit: Initial, Consult, Positive Nutrition Screen  Malnutrition Screening Tool: Malnutrition Screen  Have you recently lost weight without trying?: 14 to 23 pounds (2 points)  Have you been eating poorly because of a decreased appetite?: No (0 points)  Malnutrition Screening Tool Score: 2 and Oral Nutrition Supplements (Hospitalists)    Nutrition Recommendations/Plan:   Food and/or Nutrient Delivery: Continue Current Diet, Start Oral Nutrition Supplement  Medical food supplement therapy:  Initiate Ensure Enlive once per day (this provides 350 kcal and 20 grams protein per bottle). Prefers chocolate. Nutrition Education/Counseling: Education completed. Discussed that severely limiting fat and sugar also significantly limits calories intake. Suggested increasing kcal with healthy fats and increased use of ONS. Suggested trying 1/2 bottle twice a day yo see if that improves GI tolerance. Handouts provided:  High protein breakfast and lunch ideas, high protein high calorie snacks, high protein high calorie hints,mini meal ideas, Ensure plus labeling information  indicating suitable for use for lactose intolerance. Coordination of Nutrition Care: Continue to monitor while inpatient, Interdisciplinary Rounds          Malnutrition Assessment:  Malnutrition Status:  Moderate malnutrition  Context: Chronic Illness  Findings of clinical characteristics of malnutrition:   Energy Intake:   (Decreased po intake for at least 2-3 weeks, possible longer, unable to provide quantifiable diet hx)  Weight Loss:  Mild weight loss (specify amount and time period)     Body Fat Loss:  Mild body fat loss (moderate) Buccal region, Fat Overlying Ribs, Triceps   Muscle Mass Loss:  Mild muscle mass loss (mild to moderate) Temples (temporalis), Clavicles (pectoralis & deltoids), Hand (interosseous)  Fluid Accumulation:  Unable to assess     Strength:  Not Performed  Nutrition Assessment:  Nutrition History: 11/8: Unable to provide any quantifiable nutrition history. Pt reports at baseline he eats 3 meals per day. He eats \"fat free\" and sugar free items d/t his heart disease. He drinks Boost about 3 times per week. When he drank it once a day he had  more GI symptoms which he r/t to being lactose intolerance. He uses lactose free milk and ice cream. Reviewed that Boost and Ensure are lactose free and he replied that he has not been able to find lactose free on the packaging and won't believe it until he sees it. He says he has been losing weight for awhile and he now realizes he has been sick for a couple of months. Reports recent UBW as 136# and last weighed :high school\" weight of 120# and knows he is less than that now. 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 Nutrition Background:   H/O GERD, HLD/CAD, OA. ED visit 11/3 for abd pain, diarrhea and decreased appetite since 10/30 PM  CT abdomen from 11/3 showed diverticulosis and trace free pelvic fluid along with indeterminate 3 cm upper pole splenic lesion which is new as compared to 2013. Presented back with continued diarrhea. Admitted with diarrhea, volume depletion, LUIS MIGUEL  Nutrition Interval:  Creat-WDL, GFR improved to 55  Pt seen sitting in bed with lunch tray at bedside with 25-50% observed to be consumed. Pt reports his appetite and eating are much better since admission and he's close to eating the amount he usually does at home. No diarrhea since admission. Receptive to Ensure as long as it is lactose free, but does not want more than 1 bottle per day. Current Nutrition Therapies:  ADULT DIET;  Regular; Low Fat/Low Chol/High Fiber/2 gm Na    Current Intake:   Average Meal Intake: 26-50%        Anthropometric Measures:  Height: 5' 6\" (167.6 cm)  Current Body Wt: 120 lb 3.2 oz (54.5 kg) (11/8), Weight source: Bed Scale  BMI: 19.4  Admission Body Weight: 120 lb (54.4 kg) (stated)  Ideal Body Weight (Kg) (Calculated): 65 kg (142 lbs),    Usual Body Wt:  ,  11/3/2022 120 lbs 54.4 kg Stated   6/24/2022 124#  Cardiology OV   12/7/2021 129#  Cardiology OV   8/13/2021 131 lbs 59.4 kg PCP OV   7/1/2021 133 lbs 60.3 kg -   4/29/2021 132 lbs 8 oz 60.1 kg     Percent weight change:  7% in 1 year, 3.2% in 6 months       Edema: No data recorded  BMI Category Underweight (BMI less than 22) age over 72  Estimated Daily Nutrient Needs:  Energy (kcal/day): 9029-9530 (25-30 kcal/kg0 (Kcal/kg Weight used: 54.5 kg Current (bed scale11/8)  Protein (g/day): 65-82 (1.2-1.5 g/kg) Weight Used: (Current) 54.5 kg  Fluid (ml/day):   (1 ml/kcal)    Nutrition Diagnosis:   Moderate malnutrition, In context of chronic illness related to inadequate protein-energy intake as evidenced by Criteria as identified in malnutrition assessment  Inadequate oral intake related to altered GI function (decreased appetite) as evidenced by poor intake prior to admission (current intake meeting 50-75% of estimated needs)    Goals:       Active Goal: Meet at least 75% of estimated needs       Nutrition Monitoring and Evaluation:      Food/Nutrient Intake Outcomes: Food and Nutrient Intake, Supplement Intake  Physical Signs/Symptoms Outcomes: Weight    Discharge Planning:    Continue Oral Nutrition Supplement, Continue current diet    Chapo Monsivais, SIMEON,LD, [facility]

## 2022-11-08 NOTE — CARE COORDINATION
CM spoke with therapist re: evaluation for home services. HHPT was recommended. CM met with patient who adamantly declined home health services and stated that he does not \"have a balance problem\". CM expressed understanding and explained that patient does not have to have any services if he does not feel he needs them. Patient stated that he is completely independent, plays in a senior band (saxophone, clarinet, and flute), and cares for his wife who has early stage dementia. He has 3 daughters who are supportive and assist with care arrangements for his wife when needed. Patient will possibly discharge home tomorrow. No discharge planning needs anticipated.

## 2022-11-08 NOTE — PROGRESS NOTES
Hospitalist Progress Note   Admit Date:  2022  9:43 AM   Name:  Robby Dumont   Age:  81 y.o. Sex:  male  :  1935   MRN:  [id number]   Room:  [facility]/    Presenting Complaint: Diarrhea     Reason(s) for Admission: Acute renal failure (ARF) ([facility] [N17.9]  LUIS MIGUEL (acute kidney injury) ([facility] [N17.9]  Diarrhea, unspecified type [R19.7]     Hospital Course:   Robby Dumont is a 81 y.o. male with medical history of GERD, dyslipidemia, osteoarthritis presented to the emergency with 4 to 5 days complaint of abdominal discomfort and diarrhea. Patient was initially seen in the emergency on 11/3/2022, was discharged on Levsin and Imodium without any significant clinical improvement. Patient reports poor appetite, feeling lethargic and weak and persistent loose stools without any changes. Pt reports 3-4 episodes of loose watery stools per day. ER work-up was remarkable for creatinine of 2.12 (baseline around 1.0-1.14), CRP 13.7. CT abdomen from 11/3 showed diverticulosis and trace free pelvic fluid along with indeterminate 3 cm upper pole splenic lesion which is new as compared to 2013. Subjective & 24hr Events (22): Patient seen at bedside, resting in bed comfortably. Patient reports feeling better this morning. He denies any diarrhea, abdominal pain, nausea vomiting, fever chills or night sweats. No overnight events reported by nursing staff. ROS:  10 point review of system is negative except for what mentioned above. Assessment & Plan:     Principal Problem:    Acute renal failure (ARF) ([facility]  Plan:  -renal function is improving, creatinine almost at baseline. Creatinine down from 2.14 to 1.27  Acute renal failure secondary to volume depletion from poor oral intake and acute diarrhea. Continue IV fluids. Avoid nephrotoxic medications including NSAIDs, ACE inhibitor's/ARB's, aminoglycosides, hypotensive episodes.       Active Problems:    Acute diarrhea  Plan: 11/8-diarrhea is improving, no diarrheal episodes since admission. Continue empiric oral Cipro and Flagyl, will complete total of 7 days. C. difficile is negative, stool culture and WBC pending. Florastor 500 mg p.o. twice daily. Imodium as needed. Continue IV fluids. Questran 4 g p.o. twice daily. Dyslipidemia  Plan: Continue Zetia      GERD (gastroesophageal reflux disease)  Plan: Protonix 40 mg p.o. daily. .  PT OT eval.      Anticipated discharge needs:    Potential discharge to home tomorrow with home health aide. Diet:  ADULT DIET; Regular; Low Fat/Low Chol/High Fiber/2 gm Na  DVT PPx: Lovenox  Code status: Full Code    I spent 38 minutes of time caring for this patient on the unit nearby or at bedside, and more than 50 percent was spent on coordination of care activities, and/or patient/family counseling regarding status and plan of care. Hospital Problems:  Principal Problem:    Acute renal failure (ARF) (HCC)  Active Problems:    Acute diarrhea    Dyslipidemia    GERD (gastroesophageal reflux disease)  Resolved Problems:    * No resolved hospital problems. *      Objective:   Patient Vitals for the past 24 hrs:   Temp Pulse Resp BP SpO2   11/08/22 0341 97.9 °F (36.6 °C) 76 16 (!) 96/49 95 %   11/07/22 2342 97.7 °F (36.5 °C) 85 18 104/69 97 %   11/07/22 1919 97.7 °F (36.5 °C) 80 20 99/60 97 %   11/07/22 1600 97.5 °F (36.4 °C) 88 16 124/61 97 %   11/07/22 0941 97.7 °F (36.5 °C) 99 18 108/81 96 %       Oxygen Therapy  SpO2: 95 %  O2 Device: None (Room air)    Estimated body mass index is 19.37 kg/m² as calculated from the following:    Height as of this encounter: 5' 6\" (1.676 m). Weight as of this encounter: 120 lb (54.4 kg). No intake or output data in the 24 hours ending 11/08/22 0744      Physical Exam:     Blood pressure (!) 96/49, pulse 76, temperature 97.9 °F (36.6 °C), temperature source Oral, resp.  rate 16, height 5' 6\" (1.676 m), weight 120 lb (54.4 kg), SpO2 95 %.  General:    Well nourished. Head:  Normocephalic, atraumatic  Eyes:  Sclerae appear normal.  Pupils equally round. ENT:  Nares appear normal, no drainage. Moist oral mucosa  Neck:  No restricted ROM. Trachea midline   CV:   RRR. No m/r/g. No jugular venous distension. Lungs:   CTAB. No wheezing, rhonchi, or rales. Symmetric expansion. Abdomen: Bowel sounds present. Soft, nontender, nondistended. Extremities: No cyanosis or clubbing. No edema  Skin:     No rashes and normal coloration. Warm and dry. Neuro:  CN II-XII grossly intact. Sensation intact. A&Ox3  Psych:  Normal mood and affect.       I have personally reviewed labs and tests showing:  Recent Labs:  Recent Results (from the past 48 hour(s))   C difficile Molecular/PCR    Collection Time: 11/07/22 10:58 AM    Specimen: Miscellaneous sample   Result Value Ref Range    C. difficile toxin Molecular Negative NEG     CBC with Auto Differential    Collection Time: 11/07/22 11:32 AM   Result Value Ref Range    WBC 7.8 4.3 - 11.1 K/uL    RBC 4.12 (L) 4.23 - 5.6 M/uL    Hemoglobin 11.2 (L) 13.6 - 17.2 g/dL    Hematocrit 33.7 (L) 41.1 - 50.3 %    MCV 81.8 (L) 82.0 - 102.0 FL    MCH 27.2 26.1 - 32.9 PG    MCHC 33.2 31.4 - 35.0 g/dL    RDW 13.8 11.9 - 14.6 %    Platelets 059 085 - 228 K/uL    MPV 10.3 9.4 - 12.3 FL    nRBC 0.00 0.0 - 0.2 K/uL    Differential Type AUTOMATED      Seg Neutrophils 76 43 - 78 %    Lymphocytes 10 (L) 13 - 44 %    Monocytes 12 4.0 - 12.0 %    Eosinophils % 0 (L) 0.5 - 7.8 %    Basophils 0 0.0 - 2.0 %    Immature Granulocytes 1 0.0 - 5.0 %    Segs Absolute 6.0 1.7 - 8.2 K/UL    Absolute Lymph # 0.8 0.5 - 4.6 K/UL    Absolute Mono # 1.0 0.1 - 1.3 K/UL    Absolute Eos # 0.0 0.0 - 0.8 K/UL    Basophils Absolute 0.0 0.0 - 0.2 K/UL    Absolute Immature Granulocyte 0.1 0.0 - 0.5 K/UL   Comprehensive Metabolic Panel    Collection Time: 11/07/22 11:32 AM   Result Value Ref Range    Sodium 133 133 - 143 mmol/L    Potassium 4.4 3.5 - 5.1 mmol/L    Chloride 101 101 - 110 mmol/L    CO2 25 21 - 32 mmol/L    Anion Gap 7 2 - 11 mmol/L    Glucose 119 (H) 65 - 100 mg/dL    BUN 69 (H) 8 - 23 MG/DL    Creatinine 2.12 (H) 0.8 - 1.5 MG/DL    Est, Glom Filt Rate 30 (L) >60 ml/min/1.73m2    Calcium 9.8 8.3 - 10.4 MG/DL    Total Bilirubin 0.7 0.2 - 1.1 MG/DL    ALT 23 12 - 65 U/L    AST 11 (L) 15 - 37 U/L    Alk Phosphatase 56 50 - 136 U/L    Total Protein 7.1 6.3 - 8.2 g/dL    Albumin 3.0 (L) 3.2 - 4.6 g/dL    Globulin 4.1 2.8 - 4.5 g/dL    Albumin/Globulin Ratio 0.7 0.4 - 1.6     Magnesium    Collection Time: 11/07/22 11:32 AM   Result Value Ref Range    Magnesium 2.7 (H) 1.8 - 2.4 mg/dL   C-Reactive Protein    Collection Time: 11/07/22 11:32 AM   Result Value Ref Range    CRP 13.7 (H) 0.0 - 0.9 mg/dL   Sedimentation Rate    Collection Time: 11/07/22 11:32 AM   Result Value Ref Range    Sed Rate, Automated 17 (H) 0 - 15 mm/hr   Procalcitonin    Collection Time: 11/07/22 11:32 AM   Result Value Ref Range    Procalcitonin 0.68 (H) 0.00 - 0.49 ng/mL   Lactic Acid    Collection Time: 11/07/22  4:06 PM   Result Value Ref Range    Lactic Acid, Plasma 0.9 0.4 - 2.0 MMOL/L   Comprehensive Metabolic Panel w/ Reflex to MG    Collection Time: 11/08/22  6:08 AM   Result Value Ref Range    Sodium 138 133 - 143 mmol/L    Potassium 3.6 3.5 - 5.1 mmol/L    Chloride 106 101 - 110 mmol/L    CO2 24 21 - 32 mmol/L    Anion Gap 8 2 - 11 mmol/L    Glucose 104 (H) 65 - 100 mg/dL    BUN 43 (H) 8 - 23 MG/DL    Creatinine 1.27 0.8 - 1.5 MG/DL    Est, Glom Filt Rate 55 (L) >60 ml/min/1.73m2    Calcium 8.6 8.3 - 10.4 MG/DL    Total Bilirubin 0.6 0.2 - 1.1 MG/DL    ALT 16 12 - 65 U/L    AST 10 (L) 15 - 37 U/L    Alk Phosphatase 44 (L) 50 - 136 U/L    Total Protein 5.2 (L) 6.3 - 8.2 g/dL    Albumin 2.2 (L) 3.2 - 4.6 g/dL    Globulin 3.0 2.8 - 4.5 g/dL    Albumin/Globulin Ratio 0.7 0.4 - 1.6     CBC with Auto Differential    Collection Time: 11/08/22  6:08 AM   Result Value Ref Range    WBC 4.6 4.3 - 11.1 K/uL    RBC 3.27 (L) 4.23 - 5.6 M/uL    Hemoglobin 9.0 (L) 13.6 - 17.2 g/dL    Hematocrit 26.6 (L) 41.1 - 50.3 %    MCV 81.3 (L) 82.0 - 102.0 FL    MCH 27.5 26.1 - 32.9 PG    MCHC 33.8 31.4 - 35.0 g/dL    RDW 13.9 11.9 - 14.6 %    Platelets 157 260 - 490 K/uL    MPV 10.0 9.4 - 12.3 FL    nRBC 0.00 0.0 - 0.2 K/uL    Seg Neutrophils 62 43 - 78 %    Lymphocytes 23 13 - 44 %    Monocytes 12 4.0 - 12.0 %    Eosinophils % 1 0.5 - 7.8 %    Basophils 1 0.0 - 2.0 %    Immature Granulocytes 1 0.0 - 5.0 %    Segs Absolute 2.9 1.7 - 8.2 K/UL    Absolute Lymph # 1.1 0.5 - 4.6 K/UL    Absolute Mono # 0.6 0.1 - 1.3 K/UL    Absolute Eos # 0.0 0.0 - 0.8 K/UL    Basophils Absolute 0.0 0.0 - 0.2 K/UL    Absolute Immature Granulocyte 0.0 0.0 - 0.5 K/UL    WBC Comment Result Confirmed By Smear      Platelet Comment ADEQUATE      Differential Type AUTOMATED         I have personally reviewed imaging studies showing: Other Studies:  XR ABDOMEN (KUB) (SINGLE AP VIEW)   Final Result   -No radiographic evidence of bowel obstruction.   -Likely left-sided renal calculus.                    Current Meds:  Current Facility-Administered Medications   Medication Dose Route Frequency    vitamin B-12 (CYANOCOBALAMIN) tablet 1,000 mcg  1,000 mcg Oral Daily    doxazosin (CARDURA) tablet 1 mg  1 mg Oral BID    zolpidem (AMBIEN) tablet 5 mg  5 mg Oral Nightly PRN    ezetimibe (ZETIA) tablet 10 mg  10 mg Oral Nightly    hyoscyamine (LEVSIN/SL) sublingual tablet 125 mcg  125 mcg SubLINGual TID PRN    pantoprazole (PROTONIX) tablet 40 mg  40 mg Oral QAM AC    tamsulosin (FLOMAX) capsule 0.4 mg  0.4 mg Oral Daily    sodium chloride flush 0.9 % injection 5-40 mL  5-40 mL IntraVENous 2 times per day    sodium chloride flush 0.9 % injection 5-40 mL  5-40 mL IntraVENous PRN    0.9 % sodium chloride infusion   IntraVENous PRN    ondansetron (ZOFRAN-ODT) disintegrating tablet 4 mg  4 mg Oral Q8H PRN    Or    ondansetron (ZOFRAN) injection 4 mg  4 mg IntraVENous Q6H PRN    acetaminophen (TYLENOL) tablet 650 mg  650 mg Oral Q6H PRN    Or    acetaminophen (TYLENOL) suppository 650 mg  650 mg Rectal Q6H PRN    magnesium sulfate 2000 mg in 50 mL IVPB premix  2,000 mg IntraVENous PRN    potassium chloride 10 mEq/100 mL IVPB (Peripheral Line)  10 mEq IntraVENous PRN    cholestyramine light packet 4 g  4 g Oral BID    lactated ringers infusion   IntraVENous Continuous    saccharomyces boulardii (FLORASTOR) capsule 500 mg  500 mg Oral BID    metroNIDAZOLE (FLAGYL) tablet 500 mg  500 mg Oral 3 times per day    ciprofloxacin (CIPRO) tablet 250 mg  250 mg Oral 2 times per day    diphenoxylate-atropine (LOMOTIL) 2.5-0.025 MG per tablet 1 tablet  1 tablet Oral 4x Daily PRN    tuberculin injection 5 Units  5 Units IntraDERmal Once       Signed:  Judy Ford MD    Part of this note may have been written by using a voice dictation software. The note has been proof read but may still contain some grammatical/other typographical errors.

## 2022-11-09 ENCOUNTER — TELEPHONE (OUTPATIENT)
Dept: INTERNAL MEDICINE CLINIC | Facility: CLINIC | Age: 87
End: 2022-11-09

## 2022-11-09 VITALS
WEIGHT: 120.2 LBS | RESPIRATION RATE: 18 BRPM | OXYGEN SATURATION: 94 % | SYSTOLIC BLOOD PRESSURE: 119 MMHG | BODY MASS INDEX: 19.32 KG/M2 | DIASTOLIC BLOOD PRESSURE: 69 MMHG | HEIGHT: 66 IN | HEART RATE: 81 BPM | TEMPERATURE: 98.4 F

## 2022-11-09 LAB
ALBUMIN SERPL-MCNC: 2.3 G/DL (ref 3.2–4.6)
ALBUMIN/GLOB SERPL: 0.8 {RATIO} (ref 0.4–1.6)
ALP SERPL-CCNC: 47 U/L (ref 50–136)
ALT SERPL-CCNC: 15 U/L (ref 12–65)
ANION GAP SERPL CALC-SCNC: 5 MMOL/L (ref 2–11)
AST SERPL-CCNC: 8 U/L (ref 15–37)
BASOPHILS # BLD: 0 K/UL (ref 0–0.2)
BASOPHILS NFR BLD: 0 % (ref 0–2)
BILIRUB SERPL-MCNC: 0.4 MG/DL (ref 0.2–1.1)
BUN SERPL-MCNC: 22 MG/DL (ref 8–23)
CALCIUM SERPL-MCNC: 8.7 MG/DL (ref 8.3–10.4)
CHLORIDE SERPL-SCNC: 108 MMOL/L (ref 101–110)
CO2 SERPL-SCNC: 26 MMOL/L (ref 21–32)
CREAT SERPL-MCNC: 1.06 MG/DL (ref 0.8–1.5)
DIFFERENTIAL METHOD BLD: ABNORMAL
EOSINOPHIL # BLD: 0.1 K/UL (ref 0–0.8)
EOSINOPHIL NFR BLD: 1 % (ref 0.5–7.8)
ERYTHROCYTE [DISTWIDTH] IN BLOOD BY AUTOMATED COUNT: 13.8 % (ref 11.9–14.6)
GLOBULIN SER CALC-MCNC: 3 G/DL (ref 2.8–4.5)
GLUCOSE SERPL-MCNC: 103 MG/DL (ref 65–100)
HCT VFR BLD AUTO: 27.6 % (ref 41.1–50.3)
HGB BLD-MCNC: 9 G/DL (ref 13.6–17.2)
IMM GRANULOCYTES # BLD AUTO: 0.1 K/UL (ref 0–0.5)
IMM GRANULOCYTES NFR BLD AUTO: 2 % (ref 0–5)
LYMPHOCYTES # BLD: 1.2 K/UL (ref 0.5–4.6)
LYMPHOCYTES NFR BLD: 17 % (ref 13–44)
MCH RBC QN AUTO: 27 PG (ref 26.1–32.9)
MCHC RBC AUTO-ENTMCNC: 32.6 G/DL (ref 31.4–35)
MCV RBC AUTO: 82.9 FL (ref 82–102)
MONOCYTES # BLD: 0.9 K/UL (ref 0.1–1.3)
MONOCYTES NFR BLD: 12 % (ref 4–12)
NEUTS SEG # BLD: 4.8 K/UL (ref 1.7–8.2)
NEUTS SEG NFR BLD: 68 % (ref 43–78)
NRBC # BLD: 0 K/UL (ref 0–0.2)
PLATELET # BLD AUTO: 274 K/UL (ref 150–450)
PMV BLD AUTO: 10 FL (ref 9.4–12.3)
POTASSIUM SERPL-SCNC: 3.7 MMOL/L (ref 3.5–5.1)
PROT SERPL-MCNC: 5.3 G/DL (ref 6.3–8.2)
RBC # BLD AUTO: 3.33 M/UL (ref 4.23–5.6)
SODIUM SERPL-SCNC: 139 MMOL/L (ref 133–143)
WBC # BLD AUTO: 7.1 K/UL (ref 4.3–11.1)

## 2022-11-09 PROCEDURE — 80053 COMPREHEN METABOLIC PANEL: CPT

## 2022-11-09 PROCEDURE — 85025 COMPLETE CBC W/AUTO DIFF WBC: CPT

## 2022-11-09 PROCEDURE — 2580000003 HC RX 258: Performed by: HOSPITALIST

## 2022-11-09 PROCEDURE — 6370000000 HC RX 637 (ALT 250 FOR IP): Performed by: HOSPITALIST

## 2022-11-09 PROCEDURE — 36415 COLL VENOUS BLD VENIPUNCTURE: CPT

## 2022-11-09 RX ORDER — CHOLESTYRAMINE LIGHT 4 G/5.7G
4 POWDER, FOR SUSPENSION ORAL 2 TIMES DAILY
Qty: 28 PACKET | Refills: 0 | Status: SHIPPED | OUTPATIENT
Start: 2022-11-09 | End: 2022-11-11 | Stop reason: ALTCHOICE

## 2022-11-09 RX ORDER — METRONIDAZOLE 500 MG/1
500 TABLET ORAL EVERY 8 HOURS SCHEDULED
Qty: 12 TABLET | Refills: 0 | Status: SHIPPED | OUTPATIENT
Start: 2022-11-09 | End: 2022-11-09 | Stop reason: SDUPTHER

## 2022-11-09 RX ORDER — METRONIDAZOLE 500 MG/1
500 TABLET ORAL EVERY 8 HOURS SCHEDULED
Qty: 12 TABLET | Refills: 0 | Status: SHIPPED | OUTPATIENT
Start: 2022-11-09 | End: 2022-11-13

## 2022-11-09 RX ORDER — SACCHAROMYCES BOULARDII 250 MG
500 CAPSULE ORAL 2 TIMES DAILY
Qty: 28 CAPSULE | Refills: 0 | Status: SHIPPED | OUTPATIENT
Start: 2022-11-09 | End: 2022-11-16

## 2022-11-09 RX ORDER — CHOLESTYRAMINE LIGHT 4 G/5.7G
4 POWDER, FOR SUSPENSION ORAL 2 TIMES DAILY
Qty: 28 PACKET | Refills: 0 | Status: SHIPPED | OUTPATIENT
Start: 2022-11-09 | End: 2022-11-09 | Stop reason: SDUPTHER

## 2022-11-09 RX ORDER — CIPROFLOXACIN 250 MG/1
250 TABLET, FILM COATED ORAL EVERY 12 HOURS SCHEDULED
Qty: 8 TABLET | Refills: 0 | Status: SHIPPED | OUTPATIENT
Start: 2022-11-09 | End: 2022-11-13

## 2022-11-09 RX ORDER — CIPROFLOXACIN 250 MG/1
250 TABLET, FILM COATED ORAL EVERY 12 HOURS SCHEDULED
Qty: 8 TABLET | Refills: 0 | Status: SHIPPED | OUTPATIENT
Start: 2022-11-09 | End: 2022-11-09 | Stop reason: SDUPTHER

## 2022-11-09 RX ORDER — SACCHAROMYCES BOULARDII 250 MG
500 CAPSULE ORAL 2 TIMES DAILY
Qty: 28 CAPSULE | Refills: 0 | Status: SHIPPED | OUTPATIENT
Start: 2022-11-09 | End: 2022-11-09 | Stop reason: SDUPTHER

## 2022-11-09 RX ADMIN — PANTOPRAZOLE SODIUM 40 MG: 40 TABLET, DELAYED RELEASE ORAL at 07:30

## 2022-11-09 RX ADMIN — METRONIDAZOLE 500 MG: 500 TABLET ORAL at 05:19

## 2022-11-09 RX ADMIN — Medication 500 MG: at 08:48

## 2022-11-09 RX ADMIN — Medication 1000 MCG: at 08:46

## 2022-11-09 RX ADMIN — CHOLESTYRAMINE 4 G: 4 POWDER, FOR SUSPENSION ORAL at 08:46

## 2022-11-09 RX ADMIN — ACETAMINOPHEN 650 MG: 325 TABLET, FILM COATED ORAL at 05:19

## 2022-11-09 RX ADMIN — CIPROFLOXACIN 250 MG: 500 TABLET, FILM COATED ORAL at 08:47

## 2022-11-09 RX ADMIN — TAMSULOSIN HYDROCHLORIDE 0.4 MG: 0.4 CAPSULE ORAL at 08:47

## 2022-11-09 RX ADMIN — SODIUM CHLORIDE, PRESERVATIVE FREE 5 ML: 5 INJECTION INTRAVENOUS at 08:50

## 2022-11-09 ASSESSMENT — ENCOUNTER SYMPTOMS
VOMITING: 0
DIARRHEA: 0
ABDOMINAL DISTENTION: 0
RESPIRATORY NEGATIVE: 1
NAUSEA: 0
ABDOMINAL PAIN: 0

## 2022-11-09 ASSESSMENT — PAIN SCALES - GENERAL
PAINLEVEL_OUTOF10: 1
PAINLEVEL_OUTOF10: 4

## 2022-11-09 NOTE — PROGRESS NOTES
Patient D/C order received. IV removed from Left AC, catheter intact, site CDI, dry dressing applied. RN reviewed AVS with patient, explanation provided for medications, pt given opportunity to ask questions, verbalized understanding of discharge instructions. Prescriptions sent to [facility] for mail order, per pt delivery takes up to 7 days. He gets immediate prescriptions filled at [facility] on [address]. RN sent message to Dr Ranjan Brower via [employer], awaiting response. RN called [facility] and requested prescriptions be transferred from [facility] to [facility]. RN attempted to call patient with update, left VM.

## 2022-11-09 NOTE — PROGRESS NOTES
Progress Note  Date:11/9/2022       Room:[facility]  Patient Name:Amilcar Harding     YOB: 1935     Age:86 y.o. Subjective    Subjective:  Symptoms:  No diarrhea. Diet:  No nausea or vomiting. Damian García is an 80yo male with a past medical history of GERD, Dyslipidemia, Osteoarthritis and BPH. He presented to the ED on 11/07 with abdominal pain and diarrhea. On 11/3 he went the the ED with similar complaints and was discharged on immodium and Azalea Curls, but had no improvement. He had decreased appetite, lethargy and persistent weakness. Labs were done in the ED and showed a Creatine of 2.12. CT of the abdomen showed diverticulosis, trace free pelvic fluid and an upper pole splenic lesion. Today, patient was examined at bedside. He reported feeling much better and is wanting to discharge. He denied abdominal pain, nausea, vomiting, diarrhea.      Past Medical History:   Diagnosis Date    Adhesive capsulitis of shoulder     Anemia, unspecified     Anxiety state, unspecified     AR (allergic rhinitis)     Arthritis     osteo generalized    CAD (coronary artery disease) 1995    MI, angioplasty    Cancer ([facility] [address] 2002    prostate/xrt    Cerebrovascular disease, unspecified 5/27/2015    Cerumen impaction     Cervicalgia     Chronic ethmoidal sinusitis     Chronic frontal sinusitis     Chronic ischemic heart disease, unspecified     Chronic lymphadenitis     Chronic maxillary sinusitis     Chronic pain     lower back    Coronary atherosclerosis of native coronary artery 5/27/2015    Degeneration of lumbar or lumbosacral intervertebral disc     Deviated septum     ED (erectile dysfunction)     Fatigue     GERD (gastroesophageal reflux disease)     managed with medication     H/O prostate cancer 2002    treated with radiation    H/O seasonal allergies     managed with Nasocort spray    High frequency hearing loss     HLD (hyperlipidemia) 6/4/2014    Hypercholesteremia     managed with medication Hypertension     controlled with meds    Hypertrophy of nasal turbinates     Loss of weight 8/13/2013    Malignant neoplasm of prostate ([facility] 8/13/2013    MI (myocardial infarction) ([facility] 1995    Nasal obstruction     Neuralgia, neuritis, and radiculitis, unspecified     Night sweat 8/19/2013    OA (osteoarthritis)     Obstructive sleep apnea 10/23/2013    sinus surgery corrected sleep apnea    Organic hypersomnia, 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    Pain in joint, shoulder region     Prostate cancer ([facility]     had radiation 2002    Sleep apnea     SNHL (sensorineural hearing loss)       Current Facility-Administered Medications   Medication Dose Route Frequency Provider Last Rate Last Admin    vitamin B-12 (CYANOCOBALAMIN) tablet 1,000 mcg  1,000 mcg Oral Daily Flavio Colbert MD   1,000 mcg at 11/09/22 0846    doxazosin (CARDURA) tablet 1 mg  1 mg Oral BID Flavio Colbert MD   1 mg at 11/08/22 2035    zolpidem (AMBIEN) tablet 5 mg  5 mg Oral Nightly PRN Flavio Colbert MD   5 mg at 11/08/22 2036    ezetimibe (ZETIA) tablet 10 mg  10 mg Oral Nightly Flavio Colbert MD   10 mg at 11/08/22 2035    hyoscyamine (LEVSIN/SL) sublingual tablet 125 mcg  125 mcg SubLINGual TID PRN Flavio Colbert MD        pantoprazole (PROTONIX) tablet 40 mg  40 mg Oral QAM AC Flavio Colbert MD   40 mg at 11/09/22 0730    tamsulosin (FLOMAX) capsule 0.4 mg  0.4 mg Oral Daily Flavio Colbert MD   0.4 mg at 11/09/22 0847    sodium chloride flush 0.9 % injection 5-40 mL  5-40 mL IntraVENous 2 times per day Flavio Colbert MD   5 mL at 11/09/22 0850    sodium chloride flush 0.9 % injection 5-40 mL  5-40 mL IntraVENous PRN Flavio Colbert MD        0.9 % sodium chloride infusion   IntraVENous PRN Flavio Colbert MD        ondansetron (ZOFRAN-ODT) disintegrating tablet 4 mg  4 mg Oral Q8H PRN Flavio Colbert MD        Or    ondansetron [facility]) injection 4 mg  4 mg IntraVENous Q6H PRN Flavio Colbert MD        acetaminophen (TYLENOL) tablet 650 mg  650 mg Oral Q6H PRN Alisha Malik MD   650 mg at 22 0519    Or    acetaminophen (TYLENOL) suppository 650 mg  650 mg Rectal Q6H PRN Alisha Malik MD        magnesium sulfate 2000 mg in 50 mL IVPB premix  2,000 mg IntraVENous PRN Alisha Malik MD        potassium chloride 10 mEq/100 mL IVPB (Peripheral Line)  10 mEq IntraVENous PRN Alisha Malik MD        cholestyramine light packet 4 g  4 g Oral BID Alisha Malik MD   4 g at 22 0846    saccharomyces boulardii (FLORASTOR) capsule 500 mg  500 mg Oral BID Alisha Malik MD   500 mg at 22 0848    metroNIDAZOLE (FLAGYL) tablet 500 mg  500 mg Oral 3 times per day Alisha Malik MD   500 mg at 22 0519    ciprofloxacin (CIPRO) tablet 250 mg  250 mg Oral 2 times per day Alisha Malik MD   250 mg at 22 0847    diphenoxylate-atropine (LOMOTIL) 2.5-0.025 MG per tablet 1 tablet  1 tablet Oral 4x Daily PRN Alisha Malik MD        tuberculin injection 5 Units  5 Units IntraDERmal Once Alisha Malik MD            Review of Systems   Constitutional: Negative. Negative for fever. HENT: Negative. Respiratory: Negative. Cardiovascular: Negative. Gastrointestinal:  Negative for abdominal distention, abdominal pain, diarrhea, nausea and vomiting. Genitourinary: Negative. Musculoskeletal: Negative. Skin: Negative. Neurological: Negative. Psychiatric/Behavioral: Negative. Objective         Vitals Last 24 Hours:  TEMPERATURE:  Temp  Av.9 °F (36.6 °C)  Min: 97.3 °F (36.3 °C)  Max: 98.4 °F (36.9 °C)  RESPIRATIONS RANGE: Resp  Av.2  Min: 14  Max: 18  PULSE OXIMETRY RANGE: SpO2  Av.2 %  Min: 94 %  Max: 98 %  PULSE RANGE: Pulse  Av.3  Min: 81  Max: 84  BLOOD PRESSURE RANGE: Systolic (05YEW), DVT:123 , Min:110 , WWI:359   ; Diastolic (64RKS), IU, Min:58, Max:69    I/O (24Hr):   No intake or output data in the 24 hours ending 22 1033  Objective:  General Appearance: Comfortable and in no acute distress. Vital signs: (most recent): Blood pressure 119/69, pulse 81, temperature 98.4 °F (36.9 °C), temperature source Oral, resp. rate 18, height 5' 6\" (1.676 m), weight 120 lb 3.2 oz (54.5 kg), SpO2 94 %. No fever. HEENT: Normal HEENT exam.    Lungs:  Normal effort and normal respiratory rate. Breath sounds clear to auscultation. Heart: Normal rate. Regular rhythm. No murmur, gallop or friction rub. Abdomen: Abdomen is soft and non-distended. Bowel sounds are normal.   There is no abdominal tenderness. Pulses: Distal pulses are intact. Neurological: Patient is alert and oriented to person, place and time. Skin:  Warm and dry. Labs/Imaging/Diagnostics    Labs:  CBC:  Recent Labs     11/07/22 1132 11/08/22 0608 11/09/22  0552   WBC 7.8 4.6 7.1   RBC 4.12* 3.27* 3.33*   HGB 11.2* 9.0* 9.0*   HCT 33.7* 26.6* 27.6*   MCV 81.8* 81.3* 82.9   RDW 13.8 13.9 13.8    256 274     CHEMISTRIES:  Recent Labs     11/07/22 1132 11/08/22  0608 11/09/22  0552    138 139   K 4.4 3.6 3.7    106 108   CO2 25 24 26   BUN 69* 43* 22   CREATININE 2.12* 1.27 1.06   GLUCOSE 119* 104* 103*   MG 2.7*  --   --      PT/INR:No results for input(s): PROTIME, INR in the last 72 hours. APTT:No results for input(s): APTT in the last 72 hours. LIVER PROFILE:  Recent Labs     11/07/22 1132 11/08/22  0608 11/09/22  0552   AST 11* 10* 8*   ALT 23 16 15   BILITOT 0.7 0.6 0.4   ALKPHOS 56 44* 47*       Imaging Last 24 Hours:  XR ABDOMEN (KUB) (SINGLE AP VIEW)    Result Date: 11/7/2022  EXAMINATION: XR ABDOMEN (KUB) (SINGLE AP VIEW) 11/7/2022 1:56 PM ACCESSION NUMBER: [id number] COMPARISON: CT abdomen/pelvis 11/3/2022. INDICATION: evaluate for any acute abdominal pathology TECHNIQUE: A single AP supine view of the abdomen was obtained. FINDINGS: Gas is seen within nondilated colon and within a few nondilated loops of small bowel.  6 mm ovoid calcification projects over the lower renal shadow, likely a renal calculus. Left-sided L4-L5 fixation hardware. Degenerative changes of the spine.     -No radiographic evidence of bowel obstruction. -Likely left-sided renal calculus. Assessment//Plan           Principal Problem:    Acute renal failure (ARF) ([address]): Likely secondary to volume depletion from diarrhea. Creatine is normal today at 1.06 and BUN is normal at 22  Plan: Avoid nephrotoxic medications     Active Problems:    Acute diarrhea: no diarrhea reported today, pt feels much better, C.  Diff negative, stool culture pending  Plan:continue empiric Ciprofloxacin and Flagyl, Immodium as needed       Dyslipidemia  Plan: Continue at home medications      GERD (gastroesophageal reflux disease)  Plan: Continue at home medications      Electronically signed by Hilbert Moritz on 11/9/22 at 10:33 AM EST

## 2022-11-09 NOTE — DISCHARGE SUMMARY
Hospitalist Discharge Summary   Admit Date:  2022  9:43 AM   DC Note date: 2022  Name:  Fredo Amato   Age:  81 y.o. Sex:  male  :  1935   MRN:  [id number]   Room:  [facility]  PCP:  Miriam Fitzgerald MD    Presenting Complaint: Diarrhea     Initial Admission Diagnosis: Acute renal failure ([facility]F) ([facility] [N17.9]  LUIS MIGUEL (acute kidney injury) ([facility] [N17.9]  Diarrhea, unspecified type [R19.7]     Problem List for this Hospitalization (present on admission):    Principal Problem:    Acute renal failure (ARF) ([facility] [address]  Active Problems:    Acute diarrhea    Dyslipidemia    GERD (gastroesophageal reflux disease)  Resolved Problems:    * No resolved hospital problems. *      Hospital Course:  Fredo Amato is a 81 y.o. male with medical history of GERD, dyslipidemia, osteoarthritis presented to the emergency with 4 to 5 days complaint of abdominal discomfort and diarrhea. Patient was initially seen in the emergency on 11/3/2022, was discharged on Levsin and Imodium without any significant clinical improvement. Patient reports poor appetite, feeling lethargic and weak and persistent loose stools without any changes. Pt reports 3-4 episodes of loose watery stools per day. Admitted with LUIS MIGUEL secondary to volume depletion from poor oral intake and acute diarrhea. Responded well to IV fluids and kidney function improved. C. difficile negative. Patient started on empiric antibiotics Cipro/Flagyl for infectious diarrhea. Also started on Questran and Florastor with significant improvement in his symptoms. All other chronic conditions stable and we continued essential home meds. No new complaint on the day of discharge. No more diarrhea. Patient is stable to discharge home today with close follow-up with PCP      Disposition: Home  Diet: ADULT DIET;  Regular; Low Fat/Low Chol/High Fiber/2 gm Na  ADULT ORAL NUTRITION SUPPLEMENT; Dinner; Standard High Calorie/High Protein Oral Supplement  Code Status: Full Code    Follow Ups:   Follow-up Information     Antonio Vanegas MD Follow up on 11/11/2022. Specialty: Internal Medicine  Why: Apt time 10:45 am  Contact information:  Diego Thompson,Third Floor [address] [facility]  [phone]                       Time spent in patient discharge and coordination 37 minutes. Plan was discussed with patient. All questions answered. Patient was stable at time of discharge. Instructions given to call a physician or return if any concerns. Discharge Medication List as of 11/9/2022 11:04 AM        START taking these medications    Details   saccharomyces boulardii (FLORASTOR) 250 MG capsule Take 2 capsules by mouth 2 times daily for 7 days, Disp-28 capsule, R-0Normal      cholestyramine light 4 g packet Take 1 packet by mouth 2 times daily for 14 days, Disp-28 packet, R-0Normal      ciprofloxacin (CIPRO) 250 MG tablet Take 1 tablet by mouth every 12 hours for 4 days, Disp-8 tablet, R-0Normal      metroNIDAZOLE (FLAGYL) 500 MG tablet Take 1 tablet by mouth every 8 hours for 4 days, Disp-12 tablet, R-0Normal           CONTINUE these medications which have NOT CHANGED    Details   aspirin 81 MG chewable tablet Take 81 mg by mouth daily 2 tablets once a day, at nightHistorical Med      Hyoscyamine Sulfate SL (LEVSIN/SL) 0.125 MG SUBL Place 1 tablet under the tongue 3 times daily as needed (abd pain), Disp-12 each, R-0Print      tamsulosin (FLOMAX) 0.4 MG capsule TAKE 1 CAPSULE NIGHTLY, Disp-30 capsule, R-11Normal      eszopiclone (LUNESTA) 2 MG TABS Take 1 tablet by mouth nightly for 90 days. , Disp-90 tablet, R-1Normal      cyanocobalamin 1000 MCG tablet Take 1,000 mcg by mouth dailyHistorical Med      doxazosin (CARDURA) 1 MG tablet Take 1 mg by mouth 2 times dailyHistorical Med      Evolocumab with Infusor ([facility]) 420 MG/3.5ML SOCT every 30 daysHistorical Med      ezetimibe (ZETIA) 10 MG tablet Take 10 mg by mouth at bedtime TAKE 1 TABLET DAILYHistorical Med      pantoprazole (PROTONIX) 40 MG tablet TAKE 1 TABLET TWICE A DAY (DISCONTINUE DAILY DOSE)Historical Med      vitamin E 100 units capsule Take 100 Units by mouth dailyHistorical Med             Procedures done this admission:  * No surgery found *    Consults this admission:  IP CONSULT TO DIETITIAN    Echocardiogram results:  No results found for this or any previous visit. Diagnostic Imaging/Tests:   XR ABDOMEN (KUB) (SINGLE AP VIEW)    Result Date: 11/7/2022  -No radiographic evidence of bowel obstruction. -Likely left-sided renal calculus. CT ABDOMEN PELVIS W IV CONTRAST Additional Contrast? Oral    Result Date: 11/3/2022  1. Diverticulosis. 2.  Trace free pelvic fluid. 3.  Indeterminate 3 cm upper pole splenic lesion. This is new compared to August 2013. This could be further assessed with follow-up CT of abdomen pelvis in 6 months.          Labs: Results:       BMP, Mg, Phos Recent Labs     11/07/22  1132 11/08/22  0608 11/09/22  0552    138 139   K 4.4 3.6 3.7    106 108   CO2 25 24 26   ANIONGAP 7 8 5   BUN 69* 43* 22   CREATININE 2.12* 1.27 1.06   LABGLOM 30* 55* >60   CALCIUM 9.8 8.6 8.7   GLUCOSE 119* 104* 103*   MG 2.7*  --   --       CBC Recent Labs     11/07/22  1132 11/08/22  0608 11/09/22  0552   WBC 7.8 4.6 7.1   RBC 4.12* 3.27* 3.33*   HGB 11.2* 9.0* 9.0*   HCT 33.7* 26.6* 27.6*   MCV 81.8* 81.3* 82.9   MCH 27.2 27.5 27.0   MCHC 33.2 33.8 32.6   RDW 13.8 13.9 13.8    256 274   MPV 10.3 10.0 10.0   NRBC 0.00 0.00 0.00   SEGS 76 62 68   LYMPHOPCT 10* 23 17   EOSRELPCT 0* 1 1   MONOPCT 12 12 12   BASOPCT 0 1 0   IMMGRAN 1 1 2   SEGSABS 6.0 2.9 4.8   LYMPHSABS 0.8 1.1 1.2   EOSABS 0.0 0.0 0.1   MONOSABS 1.0 0.6 0.9   BASOSABS 0.0 0.0 0.0   ABSIMMGRAN 0.1 0.0 0.1      LFT Recent Labs     11/07/22  1132 11/08/22  0608 11/09/22  0552   BILITOT 0.7 0.6 0.4   ALKPHOS 56 44* 47*   AST 11* 10* 8*   ALT 23 16 15   PROT 7.1 5.2* 5.3*   LABALBU 3.0* 2.2* 2.3* GLOB 4.1 3.0 3.0      Cardiac  No results found for: NTPROBNP, TROPHS   Coags No results found for: PROTIME, INR, APTT   A1c No results found for: LABA1C, EAG   Lipids Lab Results   Component Value Date/Time    CHOL 163 04/29/2021 09:00 AM    LDLCALC 94 04/29/2021 09:00 AM    LABVLDL 15 12/11/2019 11:01 AM    HDL 52 04/29/2021 09:00 AM    TRIG 90 04/29/2021 09:00 AM      Thyroid  No results found for: TSHELE, CKX8RSQ     Most Recent UA No results found for: COLORU, APPEARANCE, SPECGRAV, LABPH, PROTEINU, GLUCOSEU, KETUA, BILIRUBINUR, BLOODU, UROBILINOGEN, NITRU, LEUKOCYTESUR, WBCUA, RBCUA, EPITHUA, BACTERIA, LABCAST, MUCUS     Recent Labs     11/07/22  1058   CULTURE ORGANISM IN STUDY  CULTURE IN PROGRESS,FURTHER UPDATES TO FOLLOW       All Labs from Last 24 Hrs:  Recent Results (from the past 24 hour(s))   Comprehensive Metabolic Panel w/ Reflex to MG    Collection Time: 11/09/22  5:52 AM   Result Value Ref Range    Sodium 139 133 - 143 mmol/L    Potassium 3.7 3.5 - 5.1 mmol/L    Chloride 108 101 - 110 mmol/L    CO2 26 21 - 32 mmol/L    Anion Gap 5 2 - 11 mmol/L    Glucose 103 (H) 65 - 100 mg/dL    BUN 22 8 - 23 MG/DL    Creatinine 1.06 0.8 - 1.5 MG/DL    Est, Glom Filt Rate >60 >60 ml/min/1.73m2    Calcium 8.7 8.3 - 10.4 MG/DL    Total Bilirubin 0.4 0.2 - 1.1 MG/DL    ALT 15 12 - 65 U/L    AST 8 (L) 15 - 37 U/L    Alk Phosphatase 47 (L) 50 - 136 U/L    Total Protein 5.3 (L) 6.3 - 8.2 g/dL    Albumin 2.3 (L) 3.2 - 4.6 g/dL    Globulin 3.0 2.8 - 4.5 g/dL    Albumin/Globulin Ratio 0.8 0.4 - 1.6     CBC with Auto Differential    Collection Time: 11/09/22  5:52 AM   Result Value Ref Range    WBC 7.1 4.3 - 11.1 K/uL    RBC 3.33 (L) 4.23 - 5.6 M/uL    Hemoglobin 9.0 (L) 13.6 - 17.2 g/dL    Hematocrit 27.6 (L) 41.1 - 50.3 %    MCV 82.9 82.0 - 102.0 FL    MCH 27.0 26.1 - 32.9 PG    MCHC 32.6 31.4 - 35.0 g/dL    RDW 13.8 11.9 - 14.6 %    Platelets 055 563 - 061 K/uL    MPV 10.0 9.4 - 12.3 FL    nRBC 0.00 0.0 - 0.2 K/uL Differential Type AUTOMATED      Seg Neutrophils 68 43 - 78 %    Lymphocytes 17 13 - 44 %    Monocytes 12 4.0 - 12.0 %    Eosinophils % 1 0.5 - 7.8 %    Basophils 0 0.0 - 2.0 %    Immature Granulocytes 2 0.0 - 5.0 %    Segs Absolute 4.8 1.7 - 8.2 K/UL    Absolute Lymph # 1.2 0.5 - 4.6 K/UL    Absolute Mono # 0.9 0.1 - 1.3 K/UL    Absolute Eos # 0.1 0.0 - 0.8 K/UL    Basophils Absolute 0.0 0.0 - 0.2 K/UL    Absolute Immature Granulocyte 0.1 0.0 - 0.5 K/UL       Allergies   Allergen Reactions    Antihistamines, Diphenhydramine-Type      Pt reports prostate problems     Immunization History   Administered Date(s) Administered    COVID-19, MODERNA BLUE border, Primary or Immunocompromised, (age 12y+), IM, 100 mcg/0.5mL 02/21/2021, 03/21/2021    Influenza Virus Vaccine 10/02/2013, 10/01/2018    Influenza, FLUZONE (age 72 y+), High Dose, 0.7mL 09/11/2020    Influenza, High Dose (Fluzone 65 yrs and older) 10/05/2015, 09/22/2016, 10/08/2017, 10/01/2019    Pneumococcal Conjugate 13-valent (Jjaoyea22) 02/26/2015    Pneumococcal Vaccine 01/01/2005    Zoster Live (Zostavax) 10/18/2016       Recent Vital Data:  Patient Vitals for the past 24 hrs:   Temp Pulse Resp BP SpO2   11/09/22 0757 98.4 °F (36.9 °C) 81 18 119/69 94 %   11/09/22 0342 -- 83 14 111/68 97 %   11/08/22 2315 -- 83 18 (!) 110/58 97 %   11/08/22 1921 97.5 °F (36.4 °C) 82 16 111/62 95 %   11/08/22 1522 98.2 °F (36.8 °C) 81 17 111/64 96 %       Oxygen Therapy  SpO2: 94 %  O2 Device: None (Room air)    Estimated body mass index is 19.4 kg/m² as calculated from the following:    Height as of this encounter: 5' 6\" (1.676 m). Weight as of this encounter: 120 lb 3.2 oz (54.5 kg).     Intake/Output Summary (Last 24 hours) at 11/9/2022 1320  Last data filed at 11/9/2022 0930  Gross per 24 hour   Intake 450 ml   Output --   Net 450 ml         Physical Exam:    General:    No overt distress  Head:  Normocephalic, atraumatic  Eyes:  Sclerae appear normal.  Pupils equally round.    HENT:  Nares appear normal, no drainage. Moist mucous membranes  Neck:  No restricted ROM. Trachea midline  CV:   RRR. No m/r/g. No JVD  Lungs:   CTAB. No wheezing, rhonchi, or rales. Respirations even, unlabored  Abdomen:   Soft, nontender, nondistended. Extremities: Warm and dry. No cyanosis or clubbing. No edema. Skin:     No rashes. Normal coloration  Neuro:  CN II-XII grossly intact. Psych:  Normal mood and affect. Signed:  Lorena Tapia MD    Part of this note may have been written by using a voice dictation software. The note has been proof read but may still contain some grammatical/other typographical errors.

## 2022-11-09 NOTE — PLAN OF CARE
Problem: Discharge Planning  Goal: Discharge to home or other facility with appropriate resources  11/9/2022 1100 by Rahul Sanchez RN  Outcome: Adequate for Discharge  11/8/2022 2307 by Iveth Brower RN  Outcome: Progressing  Flowsheets (Taken 11/8/2022 2030)  Discharge to home or other facility with appropriate resources: Refer to discharge planning if patient needs post-hospital services based on physician order or complex needs related to functional status, cognitive ability or social support system     Problem: ABCDS Injury Assessment  Goal: Absence of physical injury  11/9/2022 1100 by Rahul Sanchez RN  Outcome: Adequate for Discharge  11/8/2022 2307 by Iveth Brower RN  Outcome: Progressing     Problem: Pain  Goal: Verbalizes/displays adequate comfort level or baseline comfort level  Outcome: Adequate for Discharge

## 2022-11-10 ENCOUNTER — CARE COORDINATION (OUTPATIENT)
Dept: CARE COORDINATION | Facility: CLINIC | Age: 87
End: 2022-11-10

## 2022-11-10 NOTE — CARE COORDINATION
[facility] Care Transitions Initial Follow Up Call    Call within 2 business days of discharge: Yes    LPN Care Coordinator contacted the patient by telephone to perform post hospital discharge assessment. Verified name and  with patient as identifiers. Provided introduction to self, and explanation of the LPN Care Coordinator role. Patient: Sophie Stubbs Patient : 1935   MRN: [id number]  Reason for Admission: LUIS MIGUEL  Discharge Date: 22 RARS: Readmission Risk Score: 14.4      Last Discharge  Street       Date Complaint Diagnosis Description Type Department Provider    22 Diarrhea Diarrhea, unspecified type . .. ED to Hosp-Admission (Discharged) (ADMITTED) Brenden Zaidi MD; GEORGE Rothman. Was this an external facility discharge? No Discharge Facility: [address] [place] [address],[address] to be reviewed by the provider   Additional needs identified to be addressed with provider: No  none               Method of communication with provider: none. Spoke with patient states doing fine. Patient denies any concerns during this phone call. Reminded patient of follow up appointment on 2022    LPN Care Coordinator reviewed discharge instructions with patient who verbalized understanding. The patient was given an opportunity to ask questions and does not have any further questions or concerns at this time. Were discharge instructions available to patient? Yes. Reviewed appropriate site of care based on symptoms and resources available to patient including: PCP  When to call 911. The patient agrees to contact the PCP office for questions related to their healthcare. Advance Care Planning:   Does patient have an Advance Directive: decision maker updated. Medication reconciliation was performed with patient, who verbalizes understanding of administration of home medications. Medications reviewed, 1111F entered: N/A    Was patient discharged with a pulse oximeter? no    Non-face-to-face services provided:  Obtained and reviewed discharge summary and/or continuity of care documents    Offered patient enrollment in the Remote Patient Monitoring (RPM) program for in-home monitoring: NA.    Care Transitions 24 Hour Call    Care Transitions Interventions         Follow Up  Future Appointments   Date Time Provider Mirian aNvarro   11/11/2022 10:45 AM Renelle Aschoff, MD [facility]      Goals Addressed                      This Visit's Progress      Attends follow up appointments on schedule (pt-stated)         Patient states will attend follow up appointment with PCP on 11/11/2022               LPN Care Coordinator provided contact information. Plan for follow-up call in 5-7 days based on severity of symptoms and risk factors. Plan for next call: follow-up appointment-Discuss questions with follow up appointments plan of care.     oNah Resendez LPN

## 2022-11-11 ENCOUNTER — OFFICE VISIT (OUTPATIENT)
Dept: INTERNAL MEDICINE CLINIC | Facility: CLINIC | Age: 87
End: 2022-11-11

## 2022-11-11 VITALS
HEIGHT: 66 IN | WEIGHT: 120.5 LBS | SYSTOLIC BLOOD PRESSURE: 126 MMHG | OXYGEN SATURATION: 96 % | BODY MASS INDEX: 19.37 KG/M2 | HEART RATE: 80 BPM | DIASTOLIC BLOOD PRESSURE: 70 MMHG

## 2022-11-11 DIAGNOSIS — D64.9 ANEMIA, UNSPECIFIED TYPE: ICD-10-CM

## 2022-11-11 DIAGNOSIS — Z09 HOSPITAL DISCHARGE FOLLOW-UP: ICD-10-CM

## 2022-11-11 DIAGNOSIS — R19.7 DIARRHEA, UNSPECIFIED TYPE: Primary | ICD-10-CM

## 2022-11-11 DIAGNOSIS — N17.9 ACUTE KIDNEY INJURY (HCC): ICD-10-CM

## 2022-11-11 DIAGNOSIS — K57.90 DIVERTICULOSIS: ICD-10-CM

## 2022-11-11 DIAGNOSIS — D73.89 SPLENIC LESION: ICD-10-CM

## 2022-11-11 ASSESSMENT — ENCOUNTER SYMPTOMS
ABDOMINAL PAIN: 0
BLOOD IN STOOL: 0
NAUSEA: 0
DIARRHEA: 0

## 2022-11-11 NOTE — PROGRESS NOTES
SUBJECTIVE:   Mary Ambrose is a 81 y.o. male seen for a visit regarding   Chief Complaint   Patient presents with    Follow-up        Other  This is a new (for transition care -caller by Luciana Foster w/in 2 d--in hospital 11/7-9 for diarrhea and LUIS MIGUEL) problem. The current episode started 1 to 4 weeks ago (had diarrhea and seen ER 11/3 -no rx; wbc nl; diarrhea persisted -in ER 11/7 and admitted --wbc 7,800; cr to 2.12 and then down to 1.27--crp 13.7--took cipro/flagyl and is better--no BM for past 3 d but just started eating yesterday). Episode frequency: CT had tic present --a splenic lesion also 3 cm indeterminate-f/u 6 mo suggested. Pertinent negatives include no abdominal pain, fever or nausea. Past Medical History, Past Surgical History, Family history, Social History, and Medications were all reviewed with the patient today and updated as necessary. Current Outpatient Medications   Medication Sig Dispense Refill    saccharomyces boulardii (FLORASTOR) 250 MG capsule Take 2 capsules by mouth 2 times daily for 7 days 28 capsule 0    ciprofloxacin (CIPRO) 250 MG tablet Take 1 tablet by mouth every 12 hours for 4 days 8 tablet 0    metroNIDAZOLE (FLAGYL) 500 MG tablet Take 1 tablet by mouth every 8 hours for 4 days 12 tablet 0    aspirin 81 MG chewable tablet Take 81 mg by mouth daily 2 tablets once a day, at night      tamsulosin (FLOMAX) 0.4 MG capsule TAKE 1 CAPSULE NIGHTLY 30 capsule 11    eszopiclone (LUNESTA) 2 MG TABS Take 1 tablet by mouth nightly for 90 days.  90 tablet 1    cyanocobalamin 1000 MCG tablet Take 1,000 mcg by mouth daily      Evolocumab with Infusor ([address] [place] [address]) 420 MG/3.5ML SOCT every 30 days      ezetimibe (ZETIA) 10 MG tablet Take 10 mg by mouth at bedtime TAKE 1 TABLET DAILY      pantoprazole (PROTONIX) 40 MG tablet TAKE 1 TABLET TWICE A DAY (DISCONTINUE DAILY DOSE)      vitamin E 100 units capsule Take 100 Units by mouth daily       No current facility-administered medications for this visit. Allergies   Allergen Reactions    Antihistamines, Diphenhydramine-Type      Pt reports prostate problems     Patient Active Problem List   Diagnosis    ED (erectile dysfunction)    Benign hypertension    Anemia, unspecified    Hypertrophy of nasal turbinates    Nasal obstruction    Deviated septum    Chronic maxillary sinusitis    Cerebrovascular disease, unspecified    Coronary artery disease involving native coronary artery of native heart without angina pectoris    Degeneration of lumbar or lumbosacral intervertebral disc    Stiffness of left shoulder joint    SNHL (sensorineural hearing loss)    Ischemic cardiomyopathy    Fungal skin infection    Slow transit constipation    Heart failure (HCC)    Hx of CABG    Chronic lymphadenitis    Insomnia    DDD (degenerative disc disease), lumbar    Chronic frontal sinusitis    Cervicalgia    Chronic ischemic heart disease, unspecified    OA (osteoarthritis)    Obstructive sleep apnea    High frequency hearing loss    Hypercholesteremia    Chronic ethmoidal sinusitis    Shortness of breath    Slow urinary stream    AR (allergic rhinitis)    Malignant neoplasm of prostate (HCC)    HLD (hyperlipidemia)    Dyslipidemia    GERD (gastroesophageal reflux disease)    Stiffness of right shoulder joint    Acute renal failure (ARF) ([facility])    Acute diarrhea    Splenic lesion    Diverticulosis     Social History     Tobacco Use    Smoking status: Former     Packs/day: 0.25     Types: Cigarettes     Quit date: 2015     Years since quittin.5    Smokeless tobacco: Never    Tobacco comments:     Quit smoking: Pipe and cigar   Substance Use Topics    Alcohol use: Yes          Review of Systems   Constitutional:  Negative for appetite change (appetite better) and fever. Gastrointestinal:  Negative for abdominal pain, blood in stool, diarrhea and nausea.        OBJECTIVE:  /70   Pulse 80   Ht 5' 6\" (1.676 m)   Wt 120 lb 8 oz (54.7 kg) SpO2 96%   BMI 19.45 kg/m²      Physical Exam  Constitutional:       General: He is not in acute distress. Appearance: Normal appearance. Cardiovascular:      Rate and Rhythm: Normal rate and regular rhythm. Comments: One skip  Abdominal:      General: There is no distension. Palpations: There is no mass. Tenderness: There is no abdominal tenderness. Appetite better; bowel should move with better intake; to finish antibiotic -suspect this was diverticulitis-cr 1.06 at DC and BUN 22-Hb was 9.0 at DC also-recheck this in 2 week  Medical problems and test results were reviewed with the patient today. ASSESSMENT and PLAN     1. Diarrhea, unspecified type  -     CBC with Auto Differential; Future  2. Acute kidney injury ([facility] [address]  -     Basic Metabolic Panel; Future  3. Diverticulosis  4. Splenic lesion  5. Anemia, unspecified type     lab results and schedule for future lab studies reviewed with patient  reviewed medications and side effects in detail     No follow-ups on file.

## 2022-11-13 LAB
BACTERIA ISLT: ABNORMAL
ORGANISM ID: ABNORMAL
SPECIMEN SOURCE: ABNORMAL

## 2022-11-14 LAB
BACTERIA SPEC CULT: ABNORMAL
SERVICE CMNT-IMP: ABNORMAL

## 2022-11-16 ENCOUNTER — CARE COORDINATION (OUTPATIENT)
Dept: CARE COORDINATION | Facility: CLINIC | Age: 87
End: 2022-11-16

## 2022-11-16 NOTE — CARE COORDINATION
Care Transitions Outreach Attempt    Call within 2 business days of discharge: Yes   Attempted to reach patient for transitions of care follow up. Unable to reach patient. Will attempt to contact next business day. Patient: Ronny Delgado Patient : 1935 MRN: [id number]    Last Discharge  Street       Date Complaint Diagnosis Description Type Department Provider    22 Diarrhea Diarrhea, unspecified type . .. ED to Hosp-Admission (Discharged) (ADMITTED) Lauren Pruett MD; KIRSTIN Lu.. Was this an external facility discharge?  No Discharge Facility: [address] [place] [address]    Noted following upcoming appointments from discharge chart review:   [facility] follow up appointment(s):   Future Appointments   Date Time Provider Mirian Navarro   2023  9:30 AM Erin Pederson MD [facility] AMB     Non-[facility] follow up appointment(s):

## 2022-11-17 ENCOUNTER — CARE COORDINATION (OUTPATIENT)
Dept: CARE COORDINATION | Facility: CLINIC | Age: 87
End: 2022-11-17

## 2022-11-17 NOTE — CARE COORDINATION
[facility] Care Transitions Follow Up Call    LPN Care Coordinator contacted the patient by telephone to follow up after admission on 2022. Verified name and  with patient as identifiers. Patient: Gio Lewis  Patient : 1935   MRN: [id number]  Reason for Admission: LUIS MIGUEL  Discharge Date: 22 RARS: Readmission Risk Score: 14.4      Needs to be reviewed by the provider   Additional needs identified to be addressed with provider: No  none             Method of communication with provider: none. Spoke with patient states doing fine. Patient states attended follow up visit with PCP and visit went well. Patient states appetite has returned and having regular bowel movements. This Care Coordinator will graduate this patient from this program.    Addressed changes since last contact:  none  Discussed follow-up appointments. If no appointment was previously scheduled, appointment scheduling offered: Yes. Is follow up appointment scheduled within 7 days of discharge? Yes. Follow Up  Future Appointments   Date Time Provider Mirian Navarro   2023  9:30 AM Klaudia Nieves MD [facility] AMB     Non-[facility] follow up appointment(s):     N Care Coordinator reviewed medical action plan with patient and discussed any barriers to care and/or understanding of plan of care after discharge. Discussed appropriate site of care based on symptoms and resources available to patient including: PCP  Specialist  When to call 911. The patient agrees to contact the PCP office for questions related to their healthcare. Advance Care Planning:   decision maker updated.      Patients top risk factors for readmission: medical condition-LUIS MIGUEL  Interventions to address risk factors:     Offered patient enrollment in the Remote Patient Monitoring (RPM) program for in-home monitoring: NA.     Care Transitions Subsequent and Final Call    Subsequent and Final Calls  Do you have any ongoing symptoms?: No  Have your medications changed?: No  Do you have any questions related to your medications?: No  Do you currently have any active services?: No  Do you have any needs or concerns that I can assist you with?: No  Identified Barriers: None  Care Transitions Interventions  Other Interventions:            Goals Addressed                      This Visit's Progress      Attends follow up appointments on schedule (pt-stated)   On track      Patient states will attend follow up appointment with PCP on 11/11/2022                 LPN Care Coordinator provided contact information for future needs. No further follow-up call indicated based on severity of symptoms and risk factors.       Marga Chi LPN

## 2022-11-17 NOTE — PROGRESS NOTES
Physician Progress Note      Yayo Young  CSN #:                  [id number]  :                       1935  ADMIT DATE:       2022 9:43 AM  DISCH DATE:        2022 12:03 PM  RESPONDING  PROVIDER #:        Miroslava Kasper MD          QUERY TEXT:    Patient admitted with LUIS MIGUEL and infectious diarrhea. If possible, please   document in progress notes and discharge summary if you are evaluating and/or   treating any of the following: The medical record reflects the following:  Risk Factors: GERD, dyslipidemia, osteoarthritis  Clinical Indicators: Stool culture noted, \"ESCHERICHIA COLI NO SHIGA TOXIN   DETECTED BY EIA. \"  Treatment: IV flids and IV antibiotics  Options provided:  -- Bacterial diarrhea  -- Diarrhea with prophylactic antibiotic use  -- Other - I will add my own diagnosis  -- Disagree - Not applicable / Not valid  -- Disagree - Clinically unable to determine / Unknown  -- Refer to Clinical Documentation Reviewer    PROVIDER RESPONSE TEXT:    This patient has Bacterial diarrhea.     Query created by: Elisa Moncada on 2022 3:36 PM      Electronically signed by:  Miroslava Kasper MD 2022 7:07 AM

## 2022-11-23 DIAGNOSIS — R19.7 DIARRHEA, UNSPECIFIED TYPE: ICD-10-CM

## 2022-11-23 LAB
BASOPHILS # BLD: 0.1 K/UL (ref 0–0.2)
BASOPHILS NFR BLD: 1 % (ref 0–2)
DIFFERENTIAL METHOD BLD: ABNORMAL
EOSINOPHIL # BLD: 0.1 K/UL (ref 0–0.8)
EOSINOPHIL NFR BLD: 3 % (ref 0.5–7.8)
ERYTHROCYTE [DISTWIDTH] IN BLOOD BY AUTOMATED COUNT: 15.2 % (ref 11.9–14.6)
HCT VFR BLD AUTO: 32.3 % (ref 41.1–50.3)
HGB BLD-MCNC: 10.1 G/DL (ref 13.6–17.2)
IMM GRANULOCYTES # BLD AUTO: 0 K/UL (ref 0–0.5)
IMM GRANULOCYTES NFR BLD AUTO: 0 % (ref 0–5)
LYMPHOCYTES # BLD: 0.9 K/UL (ref 0.5–4.6)
LYMPHOCYTES NFR BLD: 22 % (ref 13–44)
MCH RBC QN AUTO: 27.6 PG (ref 26.1–32.9)
MCHC RBC AUTO-ENTMCNC: 31.3 G/DL (ref 31.4–35)
MCV RBC AUTO: 88.3 FL (ref 82–102)
MONOCYTES # BLD: 0.4 K/UL (ref 0.1–1.3)
MONOCYTES NFR BLD: 10 % (ref 4–12)
NEUTS SEG # BLD: 2.7 K/UL (ref 1.7–8.2)
NEUTS SEG NFR BLD: 64 % (ref 43–78)
NRBC # BLD: 0 K/UL (ref 0–0.2)
PLATELET # BLD AUTO: 243 K/UL (ref 150–450)
PMV BLD AUTO: 10.1 FL (ref 9.4–12.3)
RBC # BLD AUTO: 3.66 M/UL (ref 4.23–5.6)
WBC # BLD AUTO: 4.1 K/UL (ref 4.3–11.1)

## 2022-12-08 DIAGNOSIS — D64.9 ANEMIA, UNSPECIFIED TYPE: Primary | ICD-10-CM

## 2023-01-01 ENCOUNTER — TELEPHONE (OUTPATIENT)
Dept: INTERNAL MEDICINE CLINIC | Facility: CLINIC | Age: 88
End: 2023-01-01

## 2023-01-01 ENCOUNTER — HOSPICE ADMISSION (OUTPATIENT)
Dept: HOSPICE | Facility: HOSPICE | Age: 88
End: 2023-01-01
Payer: MEDICARE

## 2023-01-01 DIAGNOSIS — D50.8 OTHER IRON DEFICIENCY ANEMIA: Primary | ICD-10-CM

## 2023-01-01 DIAGNOSIS — K57.90 DIVERTICULOSIS: ICD-10-CM

## 2023-01-01 DIAGNOSIS — I10 BENIGN HYPERTENSION: ICD-10-CM

## 2023-01-01 DIAGNOSIS — R06.02 SHORTNESS OF BREATH: ICD-10-CM

## 2023-01-01 LAB
ALBUMIN SERPL-MCNC: 3.3 G/DL (ref 3.2–4.6)
ALBUMIN/GLOB SERPL: 1.7 (ref 0.4–1.6)
ALP SERPL-CCNC: 86 U/L (ref 50–136)
ALT SERPL-CCNC: 187 U/L (ref 12–65)
ANION GAP SERPL CALC-SCNC: 4 MMOL/L (ref 2–11)
AST SERPL-CCNC: 73 U/L (ref 15–37)
BILIRUB SERPL-MCNC: 1.1 MG/DL (ref 0.2–1.1)
BUN SERPL-MCNC: 34 MG/DL (ref 8–23)
CALCIUM SERPL-MCNC: 8.7 MG/DL (ref 8.3–10.4)
CHLORIDE SERPL-SCNC: 108 MMOL/L (ref 101–110)
CO2 SERPL-SCNC: 22 MMOL/L (ref 21–32)
CREAT SERPL-MCNC: 1.3 MG/DL (ref 0.8–1.5)
ERYTHROCYTE [DISTWIDTH] IN BLOOD BY AUTOMATED COUNT: 14.3 % (ref 11.9–14.6)
GLOBULIN SER CALC-MCNC: 2 G/DL (ref 2.8–4.5)
GLUCOSE SERPL-MCNC: 111 MG/DL (ref 65–100)
HCT VFR BLD AUTO: 27.9 % (ref 41.1–50.3)
HGB BLD-MCNC: 8.8 G/DL (ref 13.6–17.2)
MCH RBC QN AUTO: 25.5 PG (ref 26.1–32.9)
MCHC RBC AUTO-ENTMCNC: 31.5 G/DL (ref 31.4–35)
MCV RBC AUTO: 80.9 FL (ref 82–102)
NRBC # BLD: 0 K/UL (ref 0–0.2)
NT PRO BNP: 4733 PG/ML
PLATELET # BLD AUTO: 198 K/UL (ref 150–450)
PMV BLD AUTO: 11.4 FL (ref 9.4–12.3)
POTASSIUM SERPL-SCNC: 4.3 MMOL/L (ref 3.5–5.1)
PROT SERPL-MCNC: 5.3 G/DL (ref 6.3–8.2)
RBC # BLD AUTO: 3.45 M/UL (ref 4.23–5.6)
SODIUM SERPL-SCNC: 134 MMOL/L (ref 133–143)
WBC # BLD AUTO: 5.7 K/UL (ref 4.3–11.1)

## 2023-01-01 PROCEDURE — 0656 HSPC GENERAL INPATIENT

## 2023-01-01 PROCEDURE — 0651 HSPC ROUTINE HOME CARE

## 2023-01-01 RX ORDER — FERRIC CARBOXYMALTOSE INJECTION 50 MG/ML
750 INJECTION, SOLUTION INTRAVENOUS ONCE
Qty: 15 ML | Refills: 1 | Status: ON HOLD | OUTPATIENT
Start: 2023-01-01 | End: 2023-01-01 | Stop reason: HOSPADM

## 2023-03-06 ENCOUNTER — OFFICE VISIT (OUTPATIENT)
Dept: INTERNAL MEDICINE CLINIC | Facility: CLINIC | Age: 88
End: 2023-03-06
Payer: MEDICARE

## 2023-03-06 VITALS
BODY MASS INDEX: 19.44 KG/M2 | WEIGHT: 121 LBS | SYSTOLIC BLOOD PRESSURE: 108 MMHG | HEIGHT: 66 IN | DIASTOLIC BLOOD PRESSURE: 60 MMHG

## 2023-03-06 DIAGNOSIS — F51.01 PRIMARY INSOMNIA: Primary | ICD-10-CM

## 2023-03-06 DIAGNOSIS — Z63.6 CAREGIVER STRESS: ICD-10-CM

## 2023-03-06 PROCEDURE — G8427 DOCREV CUR MEDS BY ELIG CLIN: HCPCS | Performed by: INTERNAL MEDICINE

## 2023-03-06 PROCEDURE — 99213 OFFICE O/P EST LOW 20 MIN: CPT | Performed by: INTERNAL MEDICINE

## 2023-03-06 PROCEDURE — G8484 FLU IMMUNIZE NO ADMIN: HCPCS | Performed by: INTERNAL MEDICINE

## 2023-03-06 PROCEDURE — 1123F ACP DISCUSS/DSCN MKR DOCD: CPT | Performed by: INTERNAL MEDICINE

## 2023-03-06 PROCEDURE — G8420 CALC BMI NORM PARAMETERS: HCPCS | Performed by: INTERNAL MEDICINE

## 2023-03-06 PROCEDURE — 1036F TOBACCO NON-USER: CPT | Performed by: INTERNAL MEDICINE

## 2023-03-06 RX ORDER — ACETAMINOPHEN 500 MG
1000 TABLET ORAL 2 TIMES DAILY
COMMUNITY
Start: 2018-05-02

## 2023-03-06 RX ORDER — ESZOPICLONE 2 MG/1
3 TABLET, FILM COATED ORAL NIGHTLY
Qty: 135 TABLET | Refills: 1 | Status: SHIPPED | OUTPATIENT
Start: 2023-03-06 | End: 2024-03-05

## 2023-03-06 RX ORDER — VIT C/B6/B5/MAGNESIUM/HERB 173 50-5-6-5MG
500 CAPSULE ORAL DAILY
COMMUNITY

## 2023-03-06 RX ORDER — ESZOPICLONE 2 MG/1
2 TABLET, FILM COATED ORAL NIGHTLY
COMMUNITY

## 2023-03-06 SDOH — ECONOMIC STABILITY: HOUSING INSECURITY
IN THE LAST 12 MONTHS, WAS THERE A TIME WHEN YOU DID NOT HAVE A STEADY PLACE TO SLEEP OR SLEPT IN A SHELTER (INCLUDING NOW)?: NO

## 2023-03-06 SDOH — SOCIAL STABILITY - SOCIAL INSECURITY: DEPENDENT RELATIVE NEEDING CARE AT HOME: Z63.6

## 2023-03-06 SDOH — ECONOMIC STABILITY: FOOD INSECURITY: WITHIN THE PAST 12 MONTHS, YOU WORRIED THAT YOUR FOOD WOULD RUN OUT BEFORE YOU GOT MONEY TO BUY MORE.: NEVER TRUE

## 2023-03-06 SDOH — ECONOMIC STABILITY: FOOD INSECURITY: WITHIN THE PAST 12 MONTHS, THE FOOD YOU BOUGHT JUST DIDN'T LAST AND YOU DIDN'T HAVE MONEY TO GET MORE.: NEVER TRUE

## 2023-03-06 SDOH — ECONOMIC STABILITY: INCOME INSECURITY: HOW HARD IS IT FOR YOU TO PAY FOR THE VERY BASICS LIKE FOOD, HOUSING, MEDICAL CARE, AND HEATING?: NOT VERY HARD

## 2023-03-06 ASSESSMENT — PATIENT HEALTH QUESTIONNAIRE - PHQ9
SUM OF ALL RESPONSES TO PHQ QUESTIONS 1-9: 1
SUM OF ALL RESPONSES TO PHQ QUESTIONS 1-9: 1
2. FEELING DOWN, DEPRESSED OR HOPELESS: 1
SUM OF ALL RESPONSES TO PHQ QUESTIONS 1-9: 1
SUM OF ALL RESPONSES TO PHQ QUESTIONS 1-9: 1
SUM OF ALL RESPONSES TO PHQ9 QUESTIONS 1 & 2: 1
1. LITTLE INTEREST OR PLEASURE IN DOING THINGS: 0

## 2023-03-06 NOTE — PROGRESS NOTES
SUBJECTIVE:   Macario Arciniega is a 81 y.o. male seen for a visit regarding   Chief Complaint   Patient presents with    Stress     Pt c/o increased stress with caring for his wife         Other  This is a chronic problem. The current episode started more than 1 month ago. The problem occurs constantly (has caregiver stress with wife (alzheimers)-wife goes to yoga chair class , plays clarinet in band). 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 Past Medical History, Past Surgical History, Family history, Social History, and Medications were all reviewed with the patient today and updated as necessary. Current Outpatient Medications   Medication Sig Dispense Refill    turmeric 500 MG CAPS Take 500 mg by mouth daily      acetaminophen (TYLENOL) 500 MG tablet Take 1,000 mg by mouth in the morning and at bedtime      eszopiclone (LUNESTA) 2 MG TABS Take 2 mg by mouth nightly. eszopiclone (LUNESTA) 2 MG TABS Take 1.5 tablets by mouth nightly. Use extra 1/2 tab if not asleep in 45 min Max Daily Amount: 3 mg 135 tablet 1    aspirin 81 MG chewable tablet Take 162 mg by mouth every evening 2 tablets once a day, at night      tamsulosin (FLOMAX) 0.4 MG capsule TAKE 1 CAPSULE NIGHTLY (Patient taking differently: Take 0.4 mg by mouth every evening) 30 capsule 11    cyanocobalamin 1000 MCG tablet Take 1,000 mcg by mouth daily      Evolocumab with Infusor (REPATHA PUSHTRONEX SYSTEM) 420 MG/3.5ML SOCT Inject 420 mg into the skin every 30 days      ezetimibe (ZETIA) 10 MG tablet Take 10 mg by mouth daily      pantoprazole (PROTONIX) 40 MG tablet Take 40 mg by mouth daily      vitamin E 100 units capsule Take 100 Units by mouth daily       No current facility-administered medications for this visit.      Allergies   Allergen Reactions    Antihistamines, Diphenhydramine-Type      Pt reports prostate problems     Patient Active Problem List   Diagnosis    ED (erectile dysfunction)    Benign hypertension    Anemia, unspecified    Hypertrophy of nasal turbinates    Nasal obstruction    Deviated septum    Chronic maxillary sinusitis    Cerebrovascular disease, unspecified    Coronary artery disease involving native coronary artery of native heart without angina pectoris    Degeneration of lumbar or lumbosacral intervertebral disc    Stiffness of left shoulder joint    SNHL (sensorineural hearing loss)    Ischemic cardiomyopathy    Fungal skin infection    Slow transit constipation    Heart failure (HCC)    Hx of CABG    Chronic lymphadenitis    Insomnia    DDD (degenerative disc disease), lumbar    Chronic frontal sinusitis    Cervicalgia    Chronic ischemic heart disease, unspecified    OA (osteoarthritis)    Obstructive sleep apnea    High frequency hearing loss    Hypercholesteremia    Chronic ethmoidal sinusitis    Shortness of breath    Slow urinary stream    AR (allergic rhinitis)    Malignant neoplasm of prostate (HCC)    HLD (hyperlipidemia)    Dyslipidemia    GERD (gastroesophageal reflux disease)    Stiffness of right shoulder joint    Acute renal failure (ARF) ([facility] [address]    Acute diarrhea    Splenic lesion    Diverticulosis     Social History     Tobacco Use    Smoking status: Former     Packs/day: 0.25     Types: Cigarettes     Quit date: 2015     Years since quittin.8    Smokeless tobacco: Never    Tobacco comments:     Quit smoking: Pipe and cigar   Substance Use Topics    Alcohol use: Yes          Review of Systems   Psychiatric/Behavioral:  Positive for sleep disturbance. Negative for dysphoric mood (does not think depressed). OBJECTIVE:  /60   Ht 5' 6\" (1.676 m)   Wt 121 lb (54.9 kg)   BMI 19.53 kg/m²      Physical Exam  Constitutional:       General: He is not in acute distress. Appearance: Normal appearance. Cardiovascular:      Rate and Rhythm: Normal rate and regular rhythm.    Pulmonary:      Breath sounds: No wheezing or rales. Medical problems and test results were reviewed with the patient today. ASSESSMENT and PLAN     1. Primary insomnia  -     eszopiclone (LUNESTA) 2 MG TABS; Take 1.5 tablets by mouth nightly. Use extra 1/2 tab if not asleep in 45 min Max Daily Amount: 3 mg, Disp-135 tablet, R-1Normal  2. Caregiver stress   Is responsible for a jazz band -sleep irregular-will come to a point where wife will need more care-supervision needed-if falls he needs help to get her up-is in touch with [employer]zheimers assoc also-if not slept in 45 min can use extra 1/2 tab lunesta  reviewed medications and side effects in detail     No follow-ups on file.

## 2023-03-17 ENCOUNTER — TELEPHONE (OUTPATIENT)
Dept: INTERNAL MEDICINE CLINIC | Facility: CLINIC | Age: 88
End: 2023-03-17

## 2023-03-17 DIAGNOSIS — I50.22 CHRONIC SYSTOLIC HEART FAILURE (HCC): ICD-10-CM

## 2023-03-17 DIAGNOSIS — I25.5 ISCHEMIC CARDIOMYOPATHY: ICD-10-CM

## 2023-03-17 DIAGNOSIS — I25.10 ATHEROSCLEROSIS OF NATIVE CORONARY ARTERY OF NATIVE HEART WITHOUT ANGINA PECTORIS: ICD-10-CM

## 2023-03-17 NOTE — TELEPHONE ENCOUNTER
Pt said he has been having several negative experiences with his cardiologist lately and would like a recommendation/referral to another cardiologists in [facility]. Please advise.

## 2023-03-28 ENCOUNTER — TELEPHONE (OUTPATIENT)
Dept: INTERNAL MEDICINE CLINIC | Facility: CLINIC | Age: 88
End: 2023-03-28

## 2023-03-31 DIAGNOSIS — I25.10 ATHEROSCLEROSIS OF NATIVE CORONARY ARTERY OF NATIVE HEART WITHOUT ANGINA PECTORIS: Primary | ICD-10-CM

## 2023-03-31 DIAGNOSIS — E78.00 HYPERCHOLESTEROLEMIA: ICD-10-CM

## 2023-03-31 RX ORDER — EVOLOCUMAB 420 MG/3.5
420 KIT SUBCUTANEOUS
Qty: 10.5 ML | Refills: 3 | Status: SHIPPED | OUTPATIENT
Start: 2023-03-31

## 2023-04-12 PROBLEM — I50.9 HEART FAILURE (HCC): Status: RESOLVED | Noted: 2018-05-06 | Resolved: 2023-01-01

## 2023-04-12 PROBLEM — I25.5 ISCHEMIC CARDIOMYOPATHY: Status: RESOLVED | Noted: 2018-06-22 | Resolved: 2023-01-01

## 2023-04-12 PROBLEM — Z95.1 HX OF CABG: Status: RESOLVED | Noted: 2018-04-26 | Resolved: 2023-04-12

## 2023-04-12 PROBLEM — I50.20 HFREF (HEART FAILURE WITH REDUCED EJECTION FRACTION) (HCC): Status: ACTIVE | Noted: 2018-05-06

## 2023-04-17 ENCOUNTER — TELEPHONE (OUTPATIENT)
Dept: INTERNAL MEDICINE CLINIC | Facility: CLINIC | Age: 88
End: 2023-04-17

## 2023-04-17 DIAGNOSIS — F51.01 PRIMARY INSOMNIA: Primary | ICD-10-CM

## 2023-04-17 RX ORDER — TRAZODONE HYDROCHLORIDE 50 MG/1
50 TABLET ORAL NIGHTLY PRN
Qty: 30 TABLET | Refills: 5 | Status: SHIPPED | OUTPATIENT
Start: 2023-04-17

## 2023-04-17 NOTE — TELEPHONE ENCOUNTER
Pt notified new rx for trazodone sent in by Dr. Wilber Higgins and to take 3 hours prior to bedtime and pt agreed. Pt has been under more stress due to wife's health and said this is why he isn't sleeping as well. Pt is scheduled to see Dr. Wilber Higgins on 5/1 and told him to call us if he needs to be seen sooner. Pt agreed.

## 2023-04-24 ENCOUNTER — TELEPHONE (OUTPATIENT)
Dept: INTERNAL MEDICINE CLINIC | Facility: CLINIC | Age: 88
End: 2023-04-24

## 2023-04-24 NOTE — TELEPHONE ENCOUNTER
Ronny Delgado called in for an appointment. He thinks his sugar is low, noding off and fatigue. I advised him going to Urgent care or ER. He refused and wanted an appointment. I offered appointment with Dr. Monisha Novoa for 04/25/23. He didn't want to come tomorrow because he has to work. He wanted an appointment for Wednesday.

## 2023-04-25 ENCOUNTER — TELEPHONE (OUTPATIENT)
Dept: CARDIOLOGY CLINIC | Age: 88
End: 2023-04-25

## 2023-04-25 DIAGNOSIS — K57.90 DIVERTICULOSIS: ICD-10-CM

## 2023-04-25 DIAGNOSIS — R06.02 SHORTNESS OF BREATH: Primary | ICD-10-CM

## 2023-04-25 DIAGNOSIS — I10 BENIGN HYPERTENSION: ICD-10-CM

## 2023-04-25 NOTE — TELEPHONE ENCOUNTER
Pt.has had 2 MI's. Has been having some SOB lately w/exertion. If he makes the bed he is huffing and puffing. He has no edema. Has been falling asleep just sitting so he has an appt. w/PCP Friday. He is wanting an appt. 's  next  available in is August.

## 2023-04-26 ENCOUNTER — OFFICE VISIT (OUTPATIENT)
Dept: INTERNAL MEDICINE CLINIC | Facility: CLINIC | Age: 88
End: 2023-04-26
Payer: MEDICARE

## 2023-04-26 ENCOUNTER — HOSPITAL ENCOUNTER (OUTPATIENT)
Dept: GENERAL RADIOLOGY | Age: 88
Discharge: HOME | End: 2023-04-29

## 2023-04-26 VITALS
OXYGEN SATURATION: 99 % | HEART RATE: 96 BPM | BODY MASS INDEX: 20.33 KG/M2 | SYSTOLIC BLOOD PRESSURE: 108 MMHG | DIASTOLIC BLOOD PRESSURE: 62 MMHG | WEIGHT: 126.5 LBS | HEIGHT: 66 IN

## 2023-04-26 DIAGNOSIS — I50.20 SYSTOLIC CONGESTIVE HEART FAILURE, UNSPECIFIED HF CHRONICITY (HCC): Primary | ICD-10-CM

## 2023-04-26 DIAGNOSIS — R06.02 SHORTNESS OF BREATH: ICD-10-CM

## 2023-04-26 PROCEDURE — G8420 CALC BMI NORM PARAMETERS: HCPCS | Performed by: INTERNAL MEDICINE

## 2023-04-26 PROCEDURE — 1123F ACP DISCUSS/DSCN MKR DOCD: CPT | Performed by: INTERNAL MEDICINE

## 2023-04-26 PROCEDURE — 99214 OFFICE O/P EST MOD 30 MIN: CPT | Performed by: INTERNAL MEDICINE

## 2023-04-26 PROCEDURE — 1036F TOBACCO NON-USER: CPT | Performed by: INTERNAL MEDICINE

## 2023-04-26 PROCEDURE — G8427 DOCREV CUR MEDS BY ELIG CLIN: HCPCS | Performed by: INTERNAL MEDICINE

## 2023-04-26 RX ORDER — ESZOPICLONE 2 MG/1
3.5 TABLET, FILM COATED ORAL NIGHTLY
COMMUNITY

## 2023-04-26 RX ORDER — FUROSEMIDE 40 MG/1
40 TABLET ORAL DAILY
Qty: 30 TABLET | Refills: 5 | Status: SHIPPED | OUTPATIENT
Start: 2023-04-26

## 2023-04-26 ASSESSMENT — ENCOUNTER SYMPTOMS
COUGH: 0
SHORTNESS OF BREATH: 1

## 2023-04-26 ASSESSMENT — PATIENT HEALTH QUESTIONNAIRE - PHQ9
SUM OF ALL RESPONSES TO PHQ9 QUESTIONS 1 & 2: 2
SUM OF ALL RESPONSES TO PHQ QUESTIONS 1-9: 2
2. FEELING DOWN, DEPRESSED OR HOPELESS: 2
1. LITTLE INTEREST OR PLEASURE IN DOING THINGS: 0
SUM OF ALL RESPONSES TO PHQ QUESTIONS 1-9: 2

## 2023-04-26 NOTE — TELEPHONE ENCOUNTER
Pt.called back was informed of MD response. I placed orders for labs and CXR. I sent note to scheduling to go ahead and schedule ECHO soon w/luciana w/ after the ECHO.

## 2023-04-26 NOTE — TELEPHONE ENCOUNTER
I just saw patient in the office 10 days ago. He has a history of fairly severe anemia. Please check CBC and CMP and BNP. Please schedule patient for echocardiogram and chest x-ray. I will see patient after he has completed these tests.

## 2023-04-26 NOTE — PROGRESS NOTES
rhythm. Pulmonary:      Effort: Pulmonary effort is normal.      Breath sounds: No wheezing. Comments: Decreased some right base  Musculoskeletal:         General: Swelling (1-2 plus to mid tibia range) present. Medical problems and test results were reviewed with the patient today. ASSESSMENT and PLAN     1. Systolic congestive heart failure, unspecified HF chronicity (HCC)  -     furosemide (LASIX) 40 MG tablet; Take 1 tablet by mouth daily, Disp-30 tablet, R-5Normal     Had lab done this am ; with BNP-CXR with effusions right more than left-likely CHF -weigh qd --if more than 3 lb down over 2 d then hold next lasix --see 1 week-has echo planned --labs done   lab results and schedule for future lab studies reviewed with patient  reviewed medications and side effects in detail   radiology results and schedule of future radiology studies reviewed with patient     Return in about 1 week (around 5/3/2023) for For medication assessment.

## 2023-04-27 ENCOUNTER — TELEPHONE (OUTPATIENT)
Dept: CARDIOLOGY CLINIC | Age: 88
End: 2023-04-27

## 2023-04-27 LAB
FERRITIN SERPL-MCNC: 13 NG/ML (ref 8–388)
IRON SERPL-MCNC: 16 UG/DL (ref 35–150)
TIBC SERPL-MCNC: 400 UG/DL (ref 250–450)

## 2023-04-28 LAB
Lab: NORMAL
REFERENCE LAB: NORMAL

## 2023-05-01 NOTE — TELEPHONE ENCOUNTER
I spoke w/pt. he said wt.is stable but he feels awful. He is suppose to see  on Wed. Breathing is not that bad. He denies any edema this has improved. He does feel better on the Lasix.

## 2023-05-03 ENCOUNTER — OFFICE VISIT (OUTPATIENT)
Dept: INTERNAL MEDICINE CLINIC | Facility: CLINIC | Age: 88
End: 2023-05-03
Payer: MEDICARE

## 2023-05-03 VITALS
HEART RATE: 81 BPM | RESPIRATION RATE: 20 BRPM | WEIGHT: 118.5 LBS | SYSTOLIC BLOOD PRESSURE: 96 MMHG | DIASTOLIC BLOOD PRESSURE: 60 MMHG | BODY MASS INDEX: 19.04 KG/M2 | OXYGEN SATURATION: 98 % | HEIGHT: 66 IN

## 2023-05-03 DIAGNOSIS — D50.8 OTHER IRON DEFICIENCY ANEMIA: ICD-10-CM

## 2023-05-03 DIAGNOSIS — I95.2 HYPOTENSION DUE TO DRUGS: ICD-10-CM

## 2023-05-03 DIAGNOSIS — I50.20 SYSTOLIC CONGESTIVE HEART FAILURE, UNSPECIFIED HF CHRONICITY (HCC): Primary | ICD-10-CM

## 2023-05-03 LAB
Lab: NORMAL
Lab: NORMAL
REFERENCE LAB: NORMAL

## 2023-05-03 PROCEDURE — G8420 CALC BMI NORM PARAMETERS: HCPCS | Performed by: INTERNAL MEDICINE

## 2023-05-03 PROCEDURE — 99213 OFFICE O/P EST LOW 20 MIN: CPT | Performed by: INTERNAL MEDICINE

## 2023-05-03 PROCEDURE — G8427 DOCREV CUR MEDS BY ELIG CLIN: HCPCS | Performed by: INTERNAL MEDICINE

## 2023-05-03 PROCEDURE — 1036F TOBACCO NON-USER: CPT | Performed by: INTERNAL MEDICINE

## 2023-05-03 PROCEDURE — 1123F ACP DISCUSS/DSCN MKR DOCD: CPT | Performed by: INTERNAL MEDICINE

## 2023-05-03 ASSESSMENT — PATIENT HEALTH QUESTIONNAIRE - PHQ9
1. LITTLE INTEREST OR PLEASURE IN DOING THINGS: 0
SUM OF ALL RESPONSES TO PHQ9 QUESTIONS 1 & 2: 1
SUM OF ALL RESPONSES TO PHQ QUESTIONS 1-9: 1
SUM OF ALL RESPONSES TO PHQ QUESTIONS 1-9: 1
2. FEELING DOWN, DEPRESSED OR HOPELESS: 1
SUM OF ALL RESPONSES TO PHQ QUESTIONS 1-9: 1
SUM OF ALL RESPONSES TO PHQ QUESTIONS 1-9: 1

## 2023-05-03 NOTE — PROGRESS NOTES
SUBJECTIVE:   Michael Santizo is a 81 y.o. male seen for a visit regarding   Chief Complaint   Patient presents with    Follow-up        HPI  Fatigue  This is a new problem. The current episode started 1 to 4 weeks ago (fatigue a week ; no energy ; day drowsiness-had CPAP years ago, stop using it years ago). The problem occurs daily. The problem has been unchanged. Associated symptoms include fatigue-seen 4/26 -Hb was 8.8 lower than prior-low iron sat-injectafer ordered but not done yet([place] has submitted for insurance approval)-gets significant fatigue -BNP was 4700 walking ; legs get weak also. Past Medical History, Past Surgical History, Family history, Social History, and Medications were all reviewed with the patient today and updated as necessary. Current Outpatient Medications   Medication Sig Dispense Refill    ferric carboxymaltose (INJECTAFER) 750 MG/15ML SOLN IV solution Infuse 15 mLs intravenously once for 1 dose 15 mL 1    eszopiclone (LUNESTA) 2 MG TABS Take 3.5 mg by mouth nightly. Take an extra 1/2 tablet if not asleep in 45 mins      furosemide (LASIX) 40 MG tablet Take 1 tablet by mouth daily 30 tablet 5    doxazosin (CARDURA) 1 MG tablet Take 1 tablet by mouth in the morning and at bedtime      Evolocumab with Infusor ([facility]) 420 MG/3.5ML SOCT Inject 420 mg into the skin every 30 days 10.5 mL 3    turmeric 500 MG CAPS Take 1 capsule by mouth daily      acetaminophen (TYLENOL) 500 MG tablet Take 2 tablets by mouth in the morning and at bedtime      aspirin 81 MG chewable tablet Take 2 tablets by mouth every evening      cyanocobalamin 1000 MCG tablet Take 1 tablet by mouth daily      ezetimibe (ZETIA) 10 MG tablet Take 1 tablet by mouth daily      pantoprazole (PROTONIX) 40 MG tablet Take 1 tablet by mouth daily      vitamin E 100 units capsule Take 1 capsule by mouth daily       No current facility-administered medications for this visit.      Allergies   Allergen

## 2023-05-08 ENCOUNTER — TELEPHONE (OUTPATIENT)
Dept: CARDIOLOGY CLINIC | Age: 88
End: 2023-05-08

## 2023-05-08 ENCOUNTER — HOSPITAL ENCOUNTER (INPATIENT)
Age: 88
LOS: 8 days | Discharge: HOSPICE | DRG: 291 | End: 2023-05-17
Attending: INTERNAL MEDICINE | Admitting: INTERNAL MEDICINE
Payer: MEDICARE

## 2023-05-08 ENCOUNTER — APPOINTMENT (OUTPATIENT)
Dept: CT IMAGING | Age: 88
DRG: 291 | End: 2023-05-08
Payer: MEDICARE

## 2023-05-08 ENCOUNTER — TELEPHONE (OUTPATIENT)
Dept: INTERNAL MEDICINE CLINIC | Facility: CLINIC | Age: 88
End: 2023-05-08

## 2023-05-08 ENCOUNTER — APPOINTMENT (OUTPATIENT)
Dept: GENERAL RADIOLOGY | Age: 88
DRG: 291 | End: 2023-05-08
Payer: MEDICARE

## 2023-05-08 ENCOUNTER — HOSPITAL ENCOUNTER (EMERGENCY)
Age: 88
Discharge: HOME | DRG: 291 | End: 2023-05-08
Attending: EMERGENCY MEDICINE
Payer: MEDICARE

## 2023-05-08 VITALS
HEART RATE: 103 BPM | RESPIRATION RATE: 7 BRPM | SYSTOLIC BLOOD PRESSURE: 126 MMHG | BODY MASS INDEX: 18.96 KG/M2 | TEMPERATURE: 97.8 F | DIASTOLIC BLOOD PRESSURE: 86 MMHG | OXYGEN SATURATION: 98 % | HEIGHT: 66 IN | WEIGHT: 118 LBS

## 2023-05-08 DIAGNOSIS — E80.6 HYPERBILIRUBINEMIA: ICD-10-CM

## 2023-05-08 DIAGNOSIS — M25.611 STIFFNESS OF RIGHT SHOULDER JOINT: ICD-10-CM

## 2023-05-08 DIAGNOSIS — J34.3 HYPERTROPHY OF NASAL TURBINATES: ICD-10-CM

## 2023-05-08 DIAGNOSIS — J34.89 NASAL OBSTRUCTION: ICD-10-CM

## 2023-05-08 DIAGNOSIS — J90 BILATERAL PLEURAL EFFUSION: ICD-10-CM

## 2023-05-08 DIAGNOSIS — R74.01 TRANSAMINITIS: ICD-10-CM

## 2023-05-08 DIAGNOSIS — K57.90 DIVERTICULOSIS: ICD-10-CM

## 2023-05-08 DIAGNOSIS — C61 MALIGNANT NEOPLASM OF PROSTATE (HCC): ICD-10-CM

## 2023-05-08 DIAGNOSIS — J32.0 CHRONIC MAXILLARY SINUSITIS: ICD-10-CM

## 2023-05-08 DIAGNOSIS — M51.36 DDD (DEGENERATIVE DISC DISEASE), LUMBAR: ICD-10-CM

## 2023-05-08 DIAGNOSIS — R39.198 SLOW URINARY STREAM: ICD-10-CM

## 2023-05-08 DIAGNOSIS — M51.37 DEGENERATION OF LUMBAR OR LUMBOSACRAL INTERVERTEBRAL DISC: ICD-10-CM

## 2023-05-08 DIAGNOSIS — R77.8 ELEVATED TROPONIN: ICD-10-CM

## 2023-05-08 DIAGNOSIS — R06.02 SHORTNESS OF BREATH: ICD-10-CM

## 2023-05-08 DIAGNOSIS — I50.9 ACUTE ON CHRONIC CONGESTIVE HEART FAILURE, UNSPECIFIED HEART FAILURE TYPE (HCC): ICD-10-CM

## 2023-05-08 DIAGNOSIS — K59.01 SLOW TRANSIT CONSTIPATION: ICD-10-CM

## 2023-05-08 DIAGNOSIS — I10 BENIGN HYPERTENSION: ICD-10-CM

## 2023-05-08 DIAGNOSIS — R53.1 WEAKNESS: ICD-10-CM

## 2023-05-08 DIAGNOSIS — G47.33 OBSTRUCTIVE SLEEP APNEA: ICD-10-CM

## 2023-05-08 DIAGNOSIS — M54.2 CERVICALGIA: ICD-10-CM

## 2023-05-08 DIAGNOSIS — J32.1 CHRONIC FRONTAL SINUSITIS: ICD-10-CM

## 2023-05-08 DIAGNOSIS — M25.612 STIFFNESS OF LEFT SHOULDER JOINT: ICD-10-CM

## 2023-05-08 DIAGNOSIS — N17.9 AKI (ACUTE KIDNEY INJURY) (HCC): Primary | ICD-10-CM

## 2023-05-08 DIAGNOSIS — D73.89 SPLENIC LESION: ICD-10-CM

## 2023-05-08 DIAGNOSIS — I67.9 CEREBROVASCULAR DISEASE, UNSPECIFIED: ICD-10-CM

## 2023-05-08 DIAGNOSIS — I88.1 CHRONIC LYMPHADENITIS: ICD-10-CM

## 2023-05-08 DIAGNOSIS — B36.9 FUNGAL SKIN INFECTION: ICD-10-CM

## 2023-05-08 DIAGNOSIS — I50.20 HFREF (HEART FAILURE WITH REDUCED EJECTION FRACTION) (HCC): Primary | ICD-10-CM

## 2023-05-08 DIAGNOSIS — J32.2 CHRONIC ETHMOIDAL SINUSITIS: ICD-10-CM

## 2023-05-08 DIAGNOSIS — I25.10 CORONARY ARTERY DISEASE INVOLVING NATIVE CORONARY ARTERY OF NATIVE HEART WITHOUT ANGINA PECTORIS: ICD-10-CM

## 2023-05-08 DIAGNOSIS — R79.89 ELEVATED BRAIN NATRIURETIC PEPTIDE (BNP) LEVEL: ICD-10-CM

## 2023-05-08 DIAGNOSIS — E78.00 HYPERCHOLESTEREMIA: ICD-10-CM

## 2023-05-08 DIAGNOSIS — J34.2 DEVIATED SEPTUM: ICD-10-CM

## 2023-05-08 PROBLEM — R19.7 ACUTE DIARRHEA: Status: RESOLVED | Noted: 2022-01-01 | Resolved: 2023-01-01

## 2023-05-08 LAB
ABO + RH BLD: NORMAL
ALBUMIN SERPL-MCNC: 3.2 G/DL (ref 3.2–4.6)
ALBUMIN/GLOB SERPL: 1.1 (ref 0.4–1.6)
ALP SERPL-CCNC: 154 U/L (ref 50–136)
ALT SERPL-CCNC: 385 U/L (ref 12–65)
ANION GAP SERPL CALC-SCNC: 11 MMOL/L (ref 2–11)
APPEARANCE UR: CLEAR
AST SERPL-CCNC: 305 U/L (ref 15–37)
BASOPHILS # BLD: 0 K/UL (ref 0–0.2)
BASOPHILS NFR BLD: 0 % (ref 0–2)
BILIRUB SERPL-MCNC: 2.6 MG/DL (ref 0.2–1.1)
BILIRUB UR QL: NEGATIVE
BLOOD GROUP ANTIBODIES SERPL: NORMAL
BUN SERPL-MCNC: 58 MG/DL (ref 8–23)
CALCIUM SERPL-MCNC: 9.2 MG/DL (ref 8.3–10.4)
CHLORIDE SERPL-SCNC: 106 MMOL/L (ref 101–110)
CO2 SERPL-SCNC: 25 MMOL/L (ref 21–32)
COLOR UR: NORMAL
CREAT SERPL-MCNC: 1.72 MG/DL (ref 0.8–1.5)
DIFFERENTIAL METHOD BLD: ABNORMAL
EKG ATRIAL RATE: 91 BPM
EKG DIAGNOSIS: NORMAL
EKG P AXIS: 82 DEGREES
EKG P-R INTERVAL: 170 MS
EKG Q-T INTERVAL: 439 MS
EKG QRS DURATION: 142 MS
EKG QTC CALCULATION (BAZETT): 541 MS
EKG R AXIS: 203 DEGREES
EKG T AXIS: 80 DEGREES
EKG VENTRICULAR RATE: 91 BPM
EOSINOPHIL # BLD: 0 K/UL (ref 0–0.8)
EOSINOPHIL NFR BLD: 0 % (ref 0.5–7.8)
ERYTHROCYTE [DISTWIDTH] IN BLOOD BY AUTOMATED COUNT: 15.4 % (ref 11.9–14.6)
GLOBULIN SER CALC-MCNC: 2.9 G/DL (ref 2.8–4.5)
GLUCOSE SERPL-MCNC: 120 MG/DL (ref 65–100)
GLUCOSE UR STRIP.AUTO-MCNC: NEGATIVE MG/DL
HCT VFR BLD AUTO: 30.1 % (ref 41.1–50.3)
HGB BLD-MCNC: 9.4 G/DL (ref 13.6–17.2)
HGB UR QL STRIP: NEGATIVE
IMM GRANULOCYTES # BLD AUTO: 0 K/UL (ref 0–0.5)
IMM GRANULOCYTES NFR BLD AUTO: 0 % (ref 0–5)
KETONES UR QL STRIP.AUTO: NEGATIVE MG/DL
LEUKOCYTE ESTERASE UR QL STRIP.AUTO: NEGATIVE
LYMPHOCYTES # BLD: 0.9 K/UL (ref 0.5–4.6)
LYMPHOCYTES NFR BLD: 12 % (ref 13–44)
MCH RBC QN AUTO: 24.9 PG (ref 26.1–32.9)
MCHC RBC AUTO-ENTMCNC: 31.2 G/DL (ref 31.4–35)
MCV RBC AUTO: 79.8 FL (ref 82–102)
MONOCYTES # BLD: 0.7 K/UL (ref 0.1–1.3)
MONOCYTES NFR BLD: 10 % (ref 4–12)
NEUTS SEG # BLD: 5.3 K/UL (ref 1.7–8.2)
NEUTS SEG NFR BLD: 76 % (ref 43–78)
NITRITE UR QL STRIP.AUTO: NEGATIVE
NRBC # BLD: 0 K/UL (ref 0–0.2)
NT PRO BNP: 3661 PG/ML
PH UR STRIP: 6.5 (ref 5–9)
PLATELET # BLD AUTO: 211 K/UL (ref 150–450)
PMV BLD AUTO: 12.6 FL (ref 9.4–12.3)
POTASSIUM SERPL-SCNC: 3.9 MMOL/L (ref 3.5–5.1)
PROT SERPL-MCNC: 6.1 G/DL (ref 6.3–8.2)
PROT UR STRIP-MCNC: NEGATIVE MG/DL
RBC # BLD AUTO: 3.77 M/UL (ref 4.23–5.6)
SODIUM SERPL-SCNC: 142 MMOL/L (ref 133–143)
SP GR UR REFRACTOMETRY: 1.01 (ref 1–1.02)
SPECIMEN EXP DATE BLD: NORMAL
TROPONIN I SERPL HS-MCNC: 219.3 PG/ML (ref 0–14)
TROPONIN I SERPL HS-MCNC: 232.3 PG/ML (ref 0–14)
UROBILINOGEN UR QL STRIP.AUTO: 1 EU/DL (ref 0.2–1)
WBC # BLD AUTO: 6.9 K/UL (ref 4.3–11.1)

## 2023-05-08 PROCEDURE — 81003 URINALYSIS AUTO W/O SCOPE: CPT

## 2023-05-08 PROCEDURE — 86901 BLOOD TYPING SEROLOGIC RH(D): CPT

## 2023-05-08 PROCEDURE — 71045 X-RAY EXAM CHEST 1 VIEW: CPT

## 2023-05-08 PROCEDURE — 74177 CT ABD & PELVIS W/CONTRAST: CPT

## 2023-05-08 PROCEDURE — 6370000000 HC RX 637 (ALT 250 FOR IP): Performed by: EMERGENCY MEDICINE

## 2023-05-08 PROCEDURE — 2580000003 HC RX 258: Performed by: NURSE PRACTITIONER

## 2023-05-08 PROCEDURE — 83880 ASSAY OF NATRIURETIC PEPTIDE: CPT

## 2023-05-08 PROCEDURE — 86900 BLOOD TYPING SEROLOGIC ABO: CPT

## 2023-05-08 PROCEDURE — 6360000004 HC RX CONTRAST MEDICATION: Performed by: EMERGENCY MEDICINE

## 2023-05-08 PROCEDURE — 86850 RBC ANTIBODY SCREEN: CPT

## 2023-05-08 PROCEDURE — 80053 COMPREHEN METABOLIC PANEL: CPT

## 2023-05-08 PROCEDURE — 85025 COMPLETE CBC W/AUTO DIFF WBC: CPT

## 2023-05-08 PROCEDURE — 99223 1ST HOSP IP/OBS HIGH 75: CPT | Performed by: INTERNAL MEDICINE

## 2023-05-08 PROCEDURE — 6370000000 HC RX 637 (ALT 250 FOR IP): Performed by: NURSE PRACTITIONER

## 2023-05-08 PROCEDURE — 2580000003 HC RX 258: Performed by: EMERGENCY MEDICINE

## 2023-05-08 PROCEDURE — 84484 ASSAY OF TROPONIN QUANT: CPT

## 2023-05-08 PROCEDURE — G0378 HOSPITAL OBSERVATION PER HR: HCPCS

## 2023-05-08 PROCEDURE — 6360000002 HC RX W HCPCS: Performed by: NURSE PRACTITIONER

## 2023-05-08 RX ORDER — SODIUM CHLORIDE 9 MG/ML
INJECTION, SOLUTION INTRAVENOUS CONTINUOUS
Status: DISCONTINUED | OUTPATIENT
Start: 2023-05-08 | End: 2023-05-09

## 2023-05-08 RX ORDER — ACETAMINOPHEN 325 MG/1
650 TABLET ORAL EVERY 6 HOURS PRN
Status: DISCONTINUED | OUTPATIENT
Start: 2023-05-08 | End: 2023-05-17 | Stop reason: HOSPADM

## 2023-05-08 RX ORDER — ASPIRIN 81 MG/1
162 TABLET, CHEWABLE ORAL EVERY EVENING
Status: DISCONTINUED | OUTPATIENT
Start: 2023-05-08 | End: 2023-05-09

## 2023-05-08 RX ORDER — ONDANSETRON 2 MG/ML
4 INJECTION INTRAMUSCULAR; INTRAVENOUS EVERY 4 HOURS PRN
Status: DISCONTINUED | OUTPATIENT
Start: 2023-05-08 | End: 2023-05-17 | Stop reason: HOSPADM

## 2023-05-08 RX ORDER — ACETAMINOPHEN 325 MG/1
650 TABLET ORAL
Status: COMPLETED | OUTPATIENT
Start: 2023-05-08 | End: 2023-05-08

## 2023-05-08 RX ORDER — MAGNESIUM SULFATE IN WATER 40 MG/ML
2000 INJECTION, SOLUTION INTRAVENOUS PRN
Status: DISCONTINUED | OUTPATIENT
Start: 2023-05-08 | End: 2023-05-16

## 2023-05-08 RX ORDER — DOXAZOSIN MESYLATE 1 MG/1
1 TABLET ORAL 2 TIMES DAILY
Status: DISCONTINUED | OUTPATIENT
Start: 2023-05-08 | End: 2023-05-09

## 2023-05-08 RX ORDER — POTASSIUM CHLORIDE 7.45 MG/ML
10 INJECTION INTRAVENOUS PRN
Status: DISCONTINUED | OUTPATIENT
Start: 2023-05-08 | End: 2023-05-16

## 2023-05-08 RX ORDER — SODIUM CHLORIDE 0.9 % (FLUSH) 0.9 %
10 SYRINGE (ML) INJECTION
Status: COMPLETED | OUTPATIENT
Start: 2023-05-08 | End: 2023-05-08

## 2023-05-08 RX ORDER — POLYETHYLENE GLYCOL 3350 17 G/17G
17 POWDER, FOR SOLUTION ORAL DAILY PRN
Status: DISCONTINUED | OUTPATIENT
Start: 2023-05-08 | End: 2023-05-16

## 2023-05-08 RX ORDER — 0.9 % SODIUM CHLORIDE 0.9 %
100 INTRAVENOUS SOLUTION INTRAVENOUS
Status: COMPLETED | OUTPATIENT
Start: 2023-05-08 | End: 2023-05-08

## 2023-05-08 RX ORDER — NITROGLYCERIN 0.4 MG/1
0.4 TABLET SUBLINGUAL EVERY 5 MIN PRN
Status: DISCONTINUED | OUTPATIENT
Start: 2023-05-08 | End: 2023-05-16

## 2023-05-08 RX ORDER — SODIUM CHLORIDE 0.9 % (FLUSH) 0.9 %
5-40 SYRINGE (ML) INJECTION EVERY 12 HOURS SCHEDULED
Status: DISCONTINUED | OUTPATIENT
Start: 2023-05-08 | End: 2023-05-17 | Stop reason: HOSPADM

## 2023-05-08 RX ORDER — POTASSIUM CHLORIDE 20 MEQ/1
40 TABLET, EXTENDED RELEASE ORAL PRN
Status: DISCONTINUED | OUTPATIENT
Start: 2023-05-08 | End: 2023-05-16

## 2023-05-08 RX ORDER — ZOLPIDEM TARTRATE 5 MG/1
5 TABLET ORAL NIGHTLY PRN
Status: DISCONTINUED | OUTPATIENT
Start: 2023-05-09 | End: 2023-05-10

## 2023-05-08 RX ORDER — FUROSEMIDE 10 MG/ML
60 INJECTION INTRAMUSCULAR; INTRAVENOUS ONCE
Status: COMPLETED | OUTPATIENT
Start: 2023-05-08 | End: 2023-05-08

## 2023-05-08 RX ORDER — 0.9 % SODIUM CHLORIDE 0.9 %
500 INTRAVENOUS SOLUTION INTRAVENOUS ONCE
Status: COMPLETED | OUTPATIENT
Start: 2023-05-08 | End: 2023-05-08

## 2023-05-08 RX ORDER — SODIUM CHLORIDE 0.9 % (FLUSH) 0.9 %
5-40 SYRINGE (ML) INJECTION PRN
Status: DISCONTINUED | OUTPATIENT
Start: 2023-05-08 | End: 2023-05-16

## 2023-05-08 RX ORDER — PANTOPRAZOLE SODIUM 40 MG/1
40 TABLET, DELAYED RELEASE ORAL DAILY
Status: DISCONTINUED | OUTPATIENT
Start: 2023-05-09 | End: 2023-05-17 | Stop reason: HOSPADM

## 2023-05-08 RX ADMIN — SODIUM CHLORIDE 500 ML: 900 INJECTION, SOLUTION INTRAVENOUS at 12:24

## 2023-05-08 RX ADMIN — ASPIRIN 81 MG 162 MG: 81 TABLET ORAL at 23:06

## 2023-05-08 RX ADMIN — IOPAMIDOL 100 ML: 755 INJECTION, SOLUTION INTRAVENOUS at 15:44

## 2023-05-08 RX ADMIN — FUROSEMIDE 60 MG: 10 INJECTION, SOLUTION INTRAMUSCULAR; INTRAVENOUS at 23:06

## 2023-05-08 RX ADMIN — ACETAMINOPHEN 650 MG: 325 TABLET, FILM COATED ORAL at 20:14

## 2023-05-08 RX ADMIN — SODIUM CHLORIDE, PRESERVATIVE FREE 10 ML: 5 INJECTION INTRAVENOUS at 23:07

## 2023-05-08 RX ADMIN — SODIUM CHLORIDE: 9 INJECTION, SOLUTION INTRAVENOUS at 23:05

## 2023-05-08 RX ADMIN — SODIUM CHLORIDE 100 ML: 9 INJECTION, SOLUTION INTRAVENOUS at 15:44

## 2023-05-08 RX ADMIN — SODIUM CHLORIDE, PRESERVATIVE FREE 10 ML: 5 INJECTION INTRAVENOUS at 15:44

## 2023-05-08 RX ADMIN — DIATRIZOATE MEGLUMINE AND DIATRIZOATE SODIUM 15 ML: 660; 100 LIQUID ORAL; RECTAL at 13:41

## 2023-05-08 ASSESSMENT — ENCOUNTER SYMPTOMS
NAUSEA: 0
ALLERGIC/IMMUNOLOGIC NEGATIVE: 1
WHEEZING: 1
RESPIRATORY NEGATIVE: 1
ABDOMINAL PAIN: 0
DIARRHEA: 0
EYES NEGATIVE: 1
COLOR CHANGE: 0
COUGH: 0
GASTROINTESTINAL NEGATIVE: 1
VOMITING: 0
BACK PAIN: 0
SHORTNESS OF BREATH: 1

## 2023-05-08 ASSESSMENT — PAIN SCALES - GENERAL
PAINLEVEL_OUTOF10: 0
PAINLEVEL_OUTOF10: 8

## 2023-05-08 ASSESSMENT — PAIN - FUNCTIONAL ASSESSMENT: PAIN_FUNCTIONAL_ASSESSMENT: 0-10

## 2023-05-08 NOTE — TELEPHONE ENCOUNTER
Pt sister called in today and states that she saw PT this weekend and she has some concerns that she wants to get back to the DR. Because she's not going to be able to make it to his doctor visit today.  Sister states pt is experiencing A lot of sob, extremely weak, very emotional, very tired & weight lost.

## 2023-05-08 NOTE — TELEPHONE ENCOUNTER
FYI:  Pt called saying he was having extreme fatigue to a point he could barely move his head and lift it up. He said he was supposed to have iron transfusions but was still waiting due to insurance approval not being received yet. Spoke with [facility] who said they are supposed to call him regarding the insurance approval no later than Thursday but if he is feeling that weak may want to go to the ER to see if they could get a transfusion done for him. Pt said he would try the ER and see if they could help in the meantime.

## 2023-05-08 NOTE — ED PROVIDER NOTES
Take 1 tablet by mouth daily    PANTOPRAZOLE (PROTONIX) 40 MG TABLET    Take 1 tablet by mouth daily    TURMERIC 500 MG CAPS    Take 1 capsule by mouth daily    VITAMIN E 100 UNITS CAPSULE    Take 1 capsule by mouth daily        Results for orders placed or performed during the hospital encounter of 05/08/23   XR CHEST PORTABLE    Narrative    EXAMINATION: XR CHEST PORTABLE 5/8/2023 12:05 PM    ACCESSION NUMBER: [id number]    COMPARISON: 4/26/2023 chest radiograph. INDICATION: Chest pain. TECHNIQUE: A single view of the chest was obtained. FINDINGS:    Lungs well inflated. No focal consolidation or edema. No sizable pleural effusion or pneumothorax. Cardiomediastinal silhouette unchanged poststernotomy. Impression    No acute airspace disease.        CBC with Auto Differential   Result Value Ref Range    WBC 6.9 4.3 - 11.1 K/uL    RBC 3.77 (L) 4.23 - 5.6 M/uL    Hemoglobin 9.4 (L) 13.6 - 17.2 g/dL    Hematocrit 30.1 (L) 41.1 - 50.3 %    MCV 79.8 (L) 82.0 - 102.0 FL    MCH 24.9 (L) 26.1 - 32.9 PG    MCHC 31.2 (L) 31.4 - 35.0 g/dL    RDW 15.4 (H) 11.9 - 14.6 %    Platelets 077 686 - 554 K/uL    MPV 12.6 (H) 9.4 - 12.3 FL    nRBC 0.00 0.0 - 0.2 K/uL    Differential Type AUTOMATED      Seg Neutrophils 76 43 - 78 %    Lymphocytes 12 (L) 13 - 44 %    Monocytes 10 4.0 - 12.0 %    Eosinophils % 0 (L) 0.5 - 7.8 %    Basophils 0 0.0 - 2.0 %    Immature Granulocytes 0 0.0 - 5.0 %    Segs Absolute 5.3 1.7 - 8.2 K/UL    Absolute Lymph # 0.9 0.5 - 4.6 K/UL    Absolute Mono # 0.7 0.1 - 1.3 K/UL    Absolute Eos # 0.0 0.0 - 0.8 K/UL    Basophils Absolute 0.0 0.0 - 0.2 K/UL    Absolute Immature Granulocyte 0.0 0.0 - 0.5 K/UL   Comprehensive Metabolic Panel   Result Value Ref Range    Sodium 142 133 - 143 mmol/L    Potassium 3.9 3.5 - 5.1 mmol/L    Chloride 106 101 - 110 mmol/L    CO2 25 21 - 32 mmol/L    Anion Gap 11 2 - 11 mmol/L    Glucose 120 (H) 65 - 100 mg/dL    BUN 58 (H) 8 - 23 MG/DL    Creatinine 1.72
1.0 EU/dL    Nitrite, Urine Negative NEG      Leukocyte Esterase, Urine Negative NEG     Troponin   Result Value Ref Range    Troponin, High Sensitivity 232.3 (HH) 0 - 14 pg/mL   Troponin   Result Value Ref Range    Troponin, High Sensitivity 219.3 (HH) 0 - 14 pg/mL   EKG 12 Lead   Result Value Ref Range    Ventricular Rate 91 BPM    Atrial Rate 91 BPM    P-R Interval 170 ms    QRS Duration 142 ms    Q-T Interval 439 ms    QTc Calculation (Bazett) 541 ms    P Axis 82 degrees    R Axis 203 degrees    T Axis 80 degrees    Diagnosis       Sinus rhythm  Ventricular tachycardia, unsustained  Right bundle branch block  Baseline wander in lead(s) V4 V5     TYPE AND SCREEN   Result Value Ref Range    Crossmatch expiration date 05/11/2023,2359     ABO/Rh O POSITIVE     Antibody Screen NEG         CT ABDOMEN PELVIS W IV CONTRAST Additional Contrast? Oral   Final Result      Diffuse heterogeneity of the liver is nonspecific but could reflect acute on   chronic liver disease. Small volume perihepatic ascites. Recommend clinical   correlation. Mild wall thickening and mucosal enhancement of the gallbladder, favored   reactive secondary to adjacent liver disease. This could be further evaluated   with dedicated right upper quadrant ultrasound as clinically indicated. Unchanged size and appearance of small cystic lesions within the right posterior   hepatic lobe and medial spleen. Partially imaged moderate right and small left pleural effusions with adjacent   atelectasis. XR CHEST PORTABLE   Final Result      No acute airspace disease. Voice dictation software was used during the making of this note. This software is not perfect and grammatical and other typographical errors may be present. This note has not been completely proofread for errors.       Ajith Garay MD  05/08/23 [id number]

## 2023-05-08 NOTE — ED TRIAGE NOTES
Pt states he has a hx of anemia. States he is waiting for an iron infusion later this week but is so weak that he \"needs something to get him through. \" Pt alert and oriented X 4. Pt ambulatory in ED waiting area.

## 2023-05-09 ENCOUNTER — APPOINTMENT (OUTPATIENT)
Dept: ULTRASOUND IMAGING | Age: 88
DRG: 291 | End: 2023-05-09
Attending: INTERNAL MEDICINE
Payer: MEDICARE

## 2023-05-09 LAB
ALBUMIN SERPL-MCNC: 3.1 G/DL (ref 3.2–4.6)
ALBUMIN/GLOB SERPL: 1.3 (ref 0.4–1.6)
ALP SERPL-CCNC: 144 U/L (ref 50–136)
ALT SERPL-CCNC: 469 U/L (ref 12–65)
ANION GAP SERPL CALC-SCNC: 6 MMOL/L (ref 2–11)
AST SERPL-CCNC: 334 U/L (ref 15–37)
BILIRUB DIRECT SERPL-MCNC: 1.3 MG/DL
BILIRUB SERPL-MCNC: 2 MG/DL (ref 0.2–1.1)
BUN SERPL-MCNC: 50 MG/DL (ref 8–23)
CALCIUM SERPL-MCNC: 8.7 MG/DL (ref 8.3–10.4)
CHLORIDE SERPL-SCNC: 109 MMOL/L (ref 101–110)
CHOLEST SERPL-MCNC: 79 MG/DL
CO2 SERPL-SCNC: 26 MMOL/L (ref 21–32)
CREAT SERPL-MCNC: 1.6 MG/DL (ref 0.8–1.5)
ERYTHROCYTE [DISTWIDTH] IN BLOOD BY AUTOMATED COUNT: 16.1 % (ref 11.9–14.6)
GLOBULIN SER CALC-MCNC: 2.4 G/DL (ref 2.8–4.5)
GLUCOSE SERPL-MCNC: 111 MG/DL (ref 65–100)
HCT VFR BLD AUTO: 30.6 % (ref 41.1–50.3)
HDLC SERPL-MCNC: 33 MG/DL (ref 40–60)
HDLC SERPL: 2.4
HGB BLD-MCNC: 9.5 G/DL (ref 13.6–17.2)
LDLC SERPL CALC-MCNC: 37 MG/DL
MAGNESIUM SERPL-MCNC: 2.7 MG/DL (ref 1.8–2.4)
MCH RBC QN AUTO: 25.1 PG (ref 26.1–32.9)
MCHC RBC AUTO-ENTMCNC: 31 G/DL (ref 31.4–35)
MCV RBC AUTO: 81 FL (ref 82–102)
NRBC # BLD: 0 K/UL (ref 0–0.2)
PLATELET # BLD AUTO: 204 K/UL (ref 150–450)
PMV BLD AUTO: 12.8 FL (ref 9.4–12.3)
POTASSIUM SERPL-SCNC: 3 MMOL/L (ref 3.5–5.1)
PROT SERPL-MCNC: 5.5 G/DL (ref 6.3–8.2)
RBC # BLD AUTO: 3.78 M/UL (ref 4.23–5.6)
SODIUM SERPL-SCNC: 141 MMOL/L (ref 133–143)
TRIGL SERPL-MCNC: 45 MG/DL (ref 35–150)
VLDLC SERPL CALC-MCNC: 9 MG/DL (ref 6–23)
WBC # BLD AUTO: 7.7 K/UL (ref 4.3–11.1)

## 2023-05-09 PROCEDURE — 80048 BASIC METABOLIC PNL TOTAL CA: CPT

## 2023-05-09 PROCEDURE — 6370000000 HC RX 637 (ALT 250 FOR IP): Performed by: NURSE PRACTITIONER

## 2023-05-09 PROCEDURE — 76700 US EXAM ABDOM COMPLETE: CPT

## 2023-05-09 PROCEDURE — 36415 COLL VENOUS BLD VENIPUNCTURE: CPT

## 2023-05-09 PROCEDURE — 2580000003 HC RX 258: Performed by: INTERNAL MEDICINE

## 2023-05-09 PROCEDURE — 6370000000 HC RX 637 (ALT 250 FOR IP): Performed by: INTERNAL MEDICINE

## 2023-05-09 PROCEDURE — 83735 ASSAY OF MAGNESIUM: CPT

## 2023-05-09 PROCEDURE — 80076 HEPATIC FUNCTION PANEL: CPT

## 2023-05-09 PROCEDURE — G0378 HOSPITAL OBSERVATION PER HR: HCPCS

## 2023-05-09 PROCEDURE — 85027 COMPLETE CBC AUTOMATED: CPT

## 2023-05-09 PROCEDURE — 2580000003 HC RX 258: Performed by: NURSE PRACTITIONER

## 2023-05-09 PROCEDURE — 80061 LIPID PANEL: CPT

## 2023-05-09 PROCEDURE — 6360000002 HC RX W HCPCS: Performed by: INTERNAL MEDICINE

## 2023-05-09 RX ORDER — POTASSIUM CHLORIDE 20 MEQ/1
40 TABLET, EXTENDED RELEASE ORAL
Status: DISCONTINUED | OUTPATIENT
Start: 2023-05-09 | End: 2023-05-15

## 2023-05-09 RX ORDER — FUROSEMIDE 10 MG/ML
40 INJECTION INTRAMUSCULAR; INTRAVENOUS 2 TIMES DAILY
Status: DISCONTINUED | OUTPATIENT
Start: 2023-05-09 | End: 2023-05-16

## 2023-05-09 RX ORDER — ASPIRIN 81 MG/1
81 TABLET, CHEWABLE ORAL EVERY EVENING
Status: DISCONTINUED | OUTPATIENT
Start: 2023-05-09 | End: 2023-05-17 | Stop reason: HOSPADM

## 2023-05-09 RX ORDER — METOPROLOL SUCCINATE 25 MG/1
25 TABLET, EXTENDED RELEASE ORAL DAILY
Status: DISCONTINUED | OUTPATIENT
Start: 2023-05-09 | End: 2023-05-15

## 2023-05-09 RX ORDER — FUROSEMIDE 10 MG/ML
40 INJECTION INTRAMUSCULAR; INTRAVENOUS 2 TIMES DAILY
Status: DISCONTINUED | OUTPATIENT
Start: 2023-05-09 | End: 2023-05-09

## 2023-05-09 RX ADMIN — ACETAMINOPHEN 650 MG: 325 TABLET ORAL at 12:52

## 2023-05-09 RX ADMIN — PANTOPRAZOLE SODIUM 40 MG: 40 TABLET, DELAYED RELEASE ORAL at 09:09

## 2023-05-09 RX ADMIN — SODIUM CHLORIDE 125 MG: 9 INJECTION, SOLUTION INTRAVENOUS at 10:39

## 2023-05-09 RX ADMIN — FUROSEMIDE 40 MG: 10 INJECTION, SOLUTION INTRAMUSCULAR; INTRAVENOUS at 17:55

## 2023-05-09 RX ADMIN — ASPIRIN 81 MG 81 MG: 81 TABLET ORAL at 17:51

## 2023-05-09 RX ADMIN — ACETAMINOPHEN 650 MG: 325 TABLET ORAL at 20:58

## 2023-05-09 RX ADMIN — POTASSIUM CHLORIDE 40 MEQ: 1500 TABLET, EXTENDED RELEASE ORAL at 09:18

## 2023-05-09 RX ADMIN — SODIUM CHLORIDE, PRESERVATIVE FREE 10 ML: 5 INJECTION INTRAVENOUS at 09:13

## 2023-05-09 RX ADMIN — ACETAMINOPHEN 650 MG: 325 TABLET ORAL at 06:23

## 2023-05-09 RX ADMIN — FUROSEMIDE 40 MG: 10 INJECTION, SOLUTION INTRAMUSCULAR; INTRAVENOUS at 09:10

## 2023-05-09 RX ADMIN — POTASSIUM CHLORIDE 40 MEQ: 1500 TABLET, EXTENDED RELEASE ORAL at 20:50

## 2023-05-09 RX ADMIN — ZOLPIDEM TARTRATE 5 MG: 5 TABLET ORAL at 22:27

## 2023-05-09 RX ADMIN — METOPROLOL SUCCINATE 25 MG: 25 TABLET, EXTENDED RELEASE ORAL at 09:09

## 2023-05-09 RX ADMIN — POTASSIUM CHLORIDE 40 MEQ: 1500 TABLET, EXTENDED RELEASE ORAL at 17:50

## 2023-05-09 ASSESSMENT — PAIN SCALES - GENERAL
PAINLEVEL_OUTOF10: 0
PAINLEVEL_OUTOF10: 3
PAINLEVEL_OUTOF10: 0
PAINLEVEL_OUTOF10: 0
PAINLEVEL_OUTOF10: 3
PAINLEVEL_OUTOF10: 3
PAINLEVEL_OUTOF10: 0

## 2023-05-09 ASSESSMENT — PAIN DESCRIPTION - LOCATION
LOCATION: BACK
LOCATION: BACK

## 2023-05-09 ASSESSMENT — PAIN DESCRIPTION - DESCRIPTORS: DESCRIPTORS: ACHING

## 2023-05-09 ASSESSMENT — PAIN DESCRIPTION - PAIN TYPE: TYPE: CHRONIC PAIN

## 2023-05-09 NOTE — ED NOTES
TRANSFER - OUT REPORT:    Verbal report given to ARNALDO Tom on [employer]  being transferred to [facility] room 326 for routine progression of patient care       Report consisted of patient's Situation, Background, Assessment and   Recommendations(SBAR). Information from the following report(s) Nurse Handoff Report was reviewed with the receiving nurse. [place] Assessment: Presents to emergency department  because of falls (Syncope, seizure, or loss of consciousness): No, Age > 79: Yes, Altered Mental Status, Intoxication with alcohol or substance confusion (Disorientation, impaired judgment, poor safety awaremess, or inability to follow instructions): No, Impaired Mobility: Ambulates or transfers with assistive devices or assistance; Unable to ambulate or transer.: Yes, Nursing Judgement: Yes  Lines:   Peripheral IV 05/08/23 Left Antecubital (Active)   Site Assessment Clean, dry & intact; Clean 05/08/23 1100   Line Status Blood return noted; Flushed;Normal saline locked;Specimen collected 05/08/23 1100   Phlebitis Assessment No symptoms 05/08/23 1100   Infiltration Assessment 0 05/08/23 1100   Dressing Status New dressing applied;Clean, dry & intact 05/08/23 1100   Dressing Type Transparent 05/08/23 1100        Opportunity for questions and clarification was provided.       Patient transported with:  [id number], monitor          Cherelle Horvath RN  05/08/23 2029

## 2023-05-10 PROBLEM — R53.83 FATIGUE: Status: ACTIVE | Noted: 2023-01-01

## 2023-05-10 PROBLEM — R53.81 DEBILITY: Status: ACTIVE | Noted: 2023-05-10

## 2023-05-10 PROBLEM — Z51.5 ENCOUNTER FOR PALLIATIVE CARE: Status: ACTIVE | Noted: 2023-01-01

## 2023-05-10 PROBLEM — I50.9 CONGESTIVE HEART FAILURE (CHF) (HCC): Status: ACTIVE | Noted: 2023-01-01

## 2023-05-10 LAB
ALBUMIN SERPL-MCNC: 3.4 G/DL (ref 3.2–4.6)
ALBUMIN/GLOB SERPL: 1.2 (ref 0.4–1.6)
ALP SERPL-CCNC: 179 U/L (ref 50–136)
ALT SERPL-CCNC: 1061 U/L (ref 12–65)
AMMONIA PLAS-SCNC: 66 UMOL/L (ref 11–32)
ANION GAP SERPL CALC-SCNC: 12 MMOL/L (ref 2–11)
AST SERPL-CCNC: 800 U/L (ref 15–37)
BASOPHILS # BLD: 0 K/UL (ref 0–0.2)
BASOPHILS NFR BLD: 0 % (ref 0–2)
BILIRUB SERPL-MCNC: 3 MG/DL (ref 0.2–1.1)
BUN SERPL-MCNC: 51 MG/DL (ref 8–23)
CALCIUM SERPL-MCNC: 9 MG/DL (ref 8.3–10.4)
CHLORIDE SERPL-SCNC: 110 MMOL/L (ref 101–110)
CO2 SERPL-SCNC: 20 MMOL/L (ref 21–32)
CREAT SERPL-MCNC: 1.9 MG/DL (ref 0.8–1.5)
DIFFERENTIAL METHOD BLD: ABNORMAL
EOSINOPHIL # BLD: 0 K/UL (ref 0–0.8)
EOSINOPHIL NFR BLD: 0 % (ref 0.5–7.8)
ERYTHROCYTE [DISTWIDTH] IN BLOOD BY AUTOMATED COUNT: 17.3 % (ref 11.9–14.6)
GLOBULIN SER CALC-MCNC: 2.8 G/DL (ref 2.8–4.5)
GLUCOSE BLD STRIP.AUTO-MCNC: 112 MG/DL (ref 65–100)
GLUCOSE SERPL-MCNC: 116 MG/DL (ref 65–100)
HBV SURFACE AG SER QL: NONREACTIVE
HCT VFR BLD AUTO: 36.9 % (ref 41.1–50.3)
HGB BLD-MCNC: 11 G/DL (ref 13.6–17.2)
IMM GRANULOCYTES # BLD AUTO: 0 K/UL (ref 0–0.5)
IMM GRANULOCYTES NFR BLD AUTO: 0 % (ref 0–5)
LYMPHOCYTES # BLD: 0.7 K/UL (ref 0.5–4.6)
LYMPHOCYTES NFR BLD: 7 % (ref 13–44)
MCH RBC QN AUTO: 25.2 PG (ref 26.1–32.9)
MCHC RBC AUTO-ENTMCNC: 29.8 G/DL (ref 31.4–35)
MCV RBC AUTO: 84.6 FL (ref 82–102)
MONOCYTES # BLD: 0.9 K/UL (ref 0.1–1.3)
MONOCYTES NFR BLD: 9 % (ref 4–12)
NEUTS SEG # BLD: 8.4 K/UL (ref 1.7–8.2)
NEUTS SEG NFR BLD: 83 % (ref 43–78)
NRBC # BLD: 0 K/UL (ref 0–0.2)
PLATELET # BLD AUTO: 185 K/UL (ref 150–450)
PMV BLD AUTO: 13.2 FL (ref 9.4–12.3)
POTASSIUM SERPL-SCNC: 4.5 MMOL/L (ref 3.5–5.1)
PROT SERPL-MCNC: 6.2 G/DL (ref 6.3–8.2)
RBC # BLD AUTO: 4.36 M/UL (ref 4.23–5.6)
SERVICE CMNT-IMP: ABNORMAL
SODIUM SERPL-SCNC: 142 MMOL/L (ref 133–143)
WBC # BLD AUTO: 10 K/UL (ref 4.3–11.1)

## 2023-05-10 PROCEDURE — 1100000003 HC PRIVATE W/ TELEMETRY

## 2023-05-10 PROCEDURE — 82962 GLUCOSE BLOOD TEST: CPT

## 2023-05-10 PROCEDURE — 36415 COLL VENOUS BLD VENIPUNCTURE: CPT

## 2023-05-10 PROCEDURE — 82104 ALPHA-1-ANTITRYPSIN PHENO: CPT

## 2023-05-10 PROCEDURE — 86803 HEPATITIS C AB TEST: CPT

## 2023-05-10 PROCEDURE — 6370000000 HC RX 637 (ALT 250 FOR IP): Performed by: NURSE PRACTITIONER

## 2023-05-10 PROCEDURE — 82140 ASSAY OF AMMONIA: CPT

## 2023-05-10 PROCEDURE — 82103 ALPHA-1-ANTITRYPSIN TOTAL: CPT

## 2023-05-10 PROCEDURE — 2580000003 HC RX 258: Performed by: NURSE PRACTITIONER

## 2023-05-10 PROCEDURE — 99223 1ST HOSP IP/OBS HIGH 75: CPT | Performed by: NURSE PRACTITIONER

## 2023-05-10 PROCEDURE — 85025 COMPLETE CBC W/AUTO DIFF WBC: CPT

## 2023-05-10 PROCEDURE — 86038 ANTINUCLEAR ANTIBODIES: CPT

## 2023-05-10 PROCEDURE — 99232 SBSQ HOSP IP/OBS MODERATE 35: CPT | Performed by: INTERNAL MEDICINE

## 2023-05-10 PROCEDURE — 6370000000 HC RX 637 (ALT 250 FOR IP): Performed by: INTERNAL MEDICINE

## 2023-05-10 PROCEDURE — 1100000000 HC RM PRIVATE

## 2023-05-10 PROCEDURE — 80053 COMPREHEN METABOLIC PANEL: CPT

## 2023-05-10 PROCEDURE — 87340 HEPATITIS B SURFACE AG IA: CPT

## 2023-05-10 PROCEDURE — 6360000002 HC RX W HCPCS: Performed by: INTERNAL MEDICINE

## 2023-05-10 RX ORDER — MIDODRINE HYDROCHLORIDE 5 MG/1
5 TABLET ORAL
Status: DISCONTINUED | OUTPATIENT
Start: 2023-05-10 | End: 2023-05-11

## 2023-05-10 RX ORDER — LACTULOSE 10 G/15ML
20 SOLUTION ORAL 2 TIMES DAILY
Status: DISCONTINUED | OUTPATIENT
Start: 2023-05-10 | End: 2023-05-17 | Stop reason: HOSPADM

## 2023-05-10 RX ADMIN — MIDODRINE HYDROCHLORIDE 5 MG: 5 TABLET ORAL at 17:29

## 2023-05-10 RX ADMIN — ASPIRIN 81 MG 81 MG: 81 TABLET ORAL at 17:29

## 2023-05-10 RX ADMIN — POTASSIUM CHLORIDE 40 MEQ: 1500 TABLET, EXTENDED RELEASE ORAL at 09:09

## 2023-05-10 RX ADMIN — MIDODRINE HYDROCHLORIDE 5 MG: 5 TABLET ORAL at 13:23

## 2023-05-10 RX ADMIN — PANTOPRAZOLE SODIUM 40 MG: 40 TABLET, DELAYED RELEASE ORAL at 09:09

## 2023-05-10 RX ADMIN — FUROSEMIDE 40 MG: 10 INJECTION, SOLUTION INTRAMUSCULAR; INTRAVENOUS at 09:09

## 2023-05-10 RX ADMIN — LACTULOSE 20 G: 20 SOLUTION ORAL at 23:38

## 2023-05-10 RX ADMIN — SODIUM CHLORIDE, PRESERVATIVE FREE 10 ML: 5 INJECTION INTRAVENOUS at 09:09

## 2023-05-10 RX ADMIN — METOPROLOL SUCCINATE 25 MG: 25 TABLET, EXTENDED RELEASE ORAL at 09:09

## 2023-05-10 ASSESSMENT — PAIN SCALES - GENERAL
PAINLEVEL_OUTOF10: 0

## 2023-05-11 LAB
ALBUMIN SERPL-MCNC: 3.1 G/DL (ref 3.2–4.6)
ALBUMIN/GLOB SERPL: 1.1 (ref 0.4–1.6)
ALP SERPL-CCNC: 164 U/L (ref 50–136)
ALT SERPL-CCNC: 1236 U/L (ref 12–65)
ANA SER QL: NEGATIVE
ANION GAP SERPL CALC-SCNC: 8 MMOL/L (ref 2–11)
AST SERPL-CCNC: 818 U/L (ref 15–37)
BASOPHILS # BLD: 0 K/UL (ref 0–0.2)
BASOPHILS NFR BLD: 0 % (ref 0–2)
BILIRUB SERPL-MCNC: 2.9 MG/DL (ref 0.2–1.1)
BUN SERPL-MCNC: 58 MG/DL (ref 8–23)
CALCIUM SERPL-MCNC: 8.9 MG/DL (ref 8.3–10.4)
CHLORIDE SERPL-SCNC: 111 MMOL/L (ref 101–110)
CO2 SERPL-SCNC: 24 MMOL/L (ref 21–32)
CREAT SERPL-MCNC: 1.9 MG/DL (ref 0.8–1.5)
DIFFERENTIAL METHOD BLD: ABNORMAL
EOSINOPHIL # BLD: 0 K/UL (ref 0–0.8)
EOSINOPHIL NFR BLD: 0 % (ref 0.5–7.8)
ERYTHROCYTE [DISTWIDTH] IN BLOOD BY AUTOMATED COUNT: 17.6 % (ref 11.9–14.6)
GLOBULIN SER CALC-MCNC: 2.7 G/DL (ref 2.8–4.5)
GLUCOSE SERPL-MCNC: 115 MG/DL (ref 65–100)
HCT VFR BLD AUTO: 35.1 % (ref 41.1–50.3)
HCV AB SERPL QL IA: NORMAL
HCV IGG SERPL QL IA: NON REACTIVE S/CO RATIO
HGB BLD-MCNC: 10.6 G/DL (ref 13.6–17.2)
IMM GRANULOCYTES # BLD AUTO: 0 K/UL (ref 0–0.5)
IMM GRANULOCYTES NFR BLD AUTO: 0 % (ref 0–5)
LYMPHOCYTES # BLD: 1.1 K/UL (ref 0.5–4.6)
LYMPHOCYTES NFR BLD: 11 % (ref 13–44)
MCH RBC QN AUTO: 25.4 PG (ref 26.1–32.9)
MCHC RBC AUTO-ENTMCNC: 30.2 G/DL (ref 31.4–35)
MCV RBC AUTO: 84.2 FL (ref 82–102)
MONOCYTES # BLD: 1 K/UL (ref 0.1–1.3)
MONOCYTES NFR BLD: 10 % (ref 4–12)
NEUTS SEG # BLD: 8.2 K/UL (ref 1.7–8.2)
NEUTS SEG NFR BLD: 79 % (ref 43–78)
NRBC # BLD: 0.02 K/UL (ref 0–0.2)
PLATELET # BLD AUTO: 184 K/UL (ref 150–450)
PMV BLD AUTO: 13.3 FL (ref 9.4–12.3)
POTASSIUM SERPL-SCNC: 4.8 MMOL/L (ref 3.5–5.1)
PROT SERPL-MCNC: 5.8 G/DL (ref 6.3–8.2)
RBC # BLD AUTO: 4.17 M/UL (ref 4.23–5.6)
SODIUM SERPL-SCNC: 143 MMOL/L (ref 133–143)
WBC # BLD AUTO: 10.4 K/UL (ref 4.3–11.1)

## 2023-05-11 PROCEDURE — 36415 COLL VENOUS BLD VENIPUNCTURE: CPT

## 2023-05-11 PROCEDURE — 6370000000 HC RX 637 (ALT 250 FOR IP): Performed by: NURSE PRACTITIONER

## 2023-05-11 PROCEDURE — 1100000000 HC RM PRIVATE

## 2023-05-11 PROCEDURE — 6360000002 HC RX W HCPCS: Performed by: INTERNAL MEDICINE

## 2023-05-11 PROCEDURE — 85025 COMPLETE CBC W/AUTO DIFF WBC: CPT

## 2023-05-11 PROCEDURE — 99232 SBSQ HOSP IP/OBS MODERATE 35: CPT | Performed by: INTERNAL MEDICINE

## 2023-05-11 PROCEDURE — 80053 COMPREHEN METABOLIC PANEL: CPT

## 2023-05-11 PROCEDURE — 1100000003 HC PRIVATE W/ TELEMETRY

## 2023-05-11 PROCEDURE — 6370000000 HC RX 637 (ALT 250 FOR IP): Performed by: INTERNAL MEDICINE

## 2023-05-11 PROCEDURE — 2580000003 HC RX 258: Performed by: NURSE PRACTITIONER

## 2023-05-11 RX ORDER — DOPAMINE HYDROCHLORIDE 320 MG/100ML
2.5 INJECTION, SOLUTION INTRAVENOUS CONTINUOUS
Status: DISCONTINUED | OUTPATIENT
Start: 2023-05-11 | End: 2023-05-14

## 2023-05-11 RX ORDER — DOPAMINE HYDROCHLORIDE 160 MG/100ML
1-20 INJECTION, SOLUTION INTRAVENOUS CONTINUOUS
Status: DISCONTINUED | OUTPATIENT
Start: 2023-05-11 | End: 2023-05-11

## 2023-05-11 RX ADMIN — SODIUM CHLORIDE, PRESERVATIVE FREE 10 ML: 5 INJECTION INTRAVENOUS at 10:23

## 2023-05-11 RX ADMIN — DOPAMINE HYDROCHLORIDE 2.5 MCG/KG/MIN: 320 INJECTION, SOLUTION INTRAVENOUS at 10:28

## 2023-05-11 RX ADMIN — LACTULOSE 20 G: 20 SOLUTION ORAL at 10:23

## 2023-05-11 RX ADMIN — SODIUM CHLORIDE, PRESERVATIVE FREE 10 ML: 5 INJECTION INTRAVENOUS at 22:03

## 2023-05-11 RX ADMIN — PANTOPRAZOLE SODIUM 40 MG: 40 TABLET, DELAYED RELEASE ORAL at 10:24

## 2023-05-11 RX ADMIN — ASPIRIN 81 MG 81 MG: 81 TABLET ORAL at 16:55

## 2023-05-11 RX ADMIN — LACTULOSE 20 G: 20 SOLUTION ORAL at 22:01

## 2023-05-11 ASSESSMENT — PAIN SCALES - GENERAL
PAINLEVEL_OUTOF10: 0

## 2023-05-12 ENCOUNTER — TELEPHONE (OUTPATIENT)
Age: 88
End: 2023-05-12

## 2023-05-12 LAB
A1AT PHENOTYP SERPL IFE: NORMAL
A1AT SERPL-MCNC: 187 MG/DL (ref 101–187)
ALBUMIN SERPL-MCNC: 3.2 G/DL (ref 3.2–4.6)
ALBUMIN/GLOB SERPL: 1.3 (ref 0.4–1.6)
ALP SERPL-CCNC: 159 U/L (ref 50–136)
ALT SERPL-CCNC: 1139 U/L (ref 12–65)
ANION GAP SERPL CALC-SCNC: 4 MMOL/L (ref 2–11)
AST SERPL-CCNC: 562 U/L (ref 15–37)
BASOPHILS # BLD: 0 K/UL (ref 0–0.2)
BASOPHILS NFR BLD: 0 % (ref 0–2)
BILIRUB SERPL-MCNC: 3.3 MG/DL (ref 0.2–1.1)
BUN SERPL-MCNC: 53 MG/DL (ref 8–23)
CALCIUM SERPL-MCNC: 8.4 MG/DL (ref 8.3–10.4)
CHLORIDE SERPL-SCNC: 110 MMOL/L (ref 101–110)
CO2 SERPL-SCNC: 26 MMOL/L (ref 21–32)
CREAT SERPL-MCNC: 1.5 MG/DL (ref 0.8–1.5)
DIFFERENTIAL METHOD BLD: ABNORMAL
EOSINOPHIL # BLD: 0 K/UL (ref 0–0.8)
EOSINOPHIL NFR BLD: 0 % (ref 0.5–7.8)
ERYTHROCYTE [DISTWIDTH] IN BLOOD BY AUTOMATED COUNT: 18.4 % (ref 11.9–14.6)
GLOBULIN SER CALC-MCNC: 2.5 G/DL (ref 2.8–4.5)
GLUCOSE SERPL-MCNC: 111 MG/DL (ref 65–100)
HCT VFR BLD AUTO: 33.7 % (ref 41.1–50.3)
HGB BLD-MCNC: 10.4 G/DL (ref 13.6–17.2)
IMM GRANULOCYTES # BLD AUTO: 0 K/UL (ref 0–0.5)
IMM GRANULOCYTES NFR BLD AUTO: 0 % (ref 0–5)
LYMPHOCYTES # BLD: 0.9 K/UL (ref 0.5–4.6)
LYMPHOCYTES NFR BLD: 9 % (ref 13–44)
MCH RBC QN AUTO: 25.7 PG (ref 26.1–32.9)
MCHC RBC AUTO-ENTMCNC: 30.9 G/DL (ref 31.4–35)
MCV RBC AUTO: 83.2 FL (ref 82–102)
MONOCYTES # BLD: 1 K/UL (ref 0.1–1.3)
MONOCYTES NFR BLD: 10 % (ref 4–12)
NEUTS SEG # BLD: 8.1 K/UL (ref 1.7–8.2)
NEUTS SEG NFR BLD: 81 % (ref 43–78)
NRBC # BLD: 0 K/UL (ref 0–0.2)
PLATELET # BLD AUTO: 157 K/UL (ref 150–450)
PMV BLD AUTO: 13.2 FL (ref 9.4–12.3)
POTASSIUM SERPL-SCNC: 4 MMOL/L (ref 3.5–5.1)
PROT SERPL-MCNC: 5.7 G/DL (ref 6.3–8.2)
RBC # BLD AUTO: 4.05 M/UL (ref 4.23–5.6)
SODIUM SERPL-SCNC: 140 MMOL/L (ref 133–143)
WBC # BLD AUTO: 9.9 K/UL (ref 4.3–11.1)

## 2023-05-12 PROCEDURE — 97161 PT EVAL LOW COMPLEX 20 MIN: CPT

## 2023-05-12 PROCEDURE — 97165 OT EVAL LOW COMPLEX 30 MIN: CPT

## 2023-05-12 PROCEDURE — 99232 SBSQ HOSP IP/OBS MODERATE 35: CPT | Performed by: INTERNAL MEDICINE

## 2023-05-12 PROCEDURE — 85025 COMPLETE CBC W/AUTO DIFF WBC: CPT

## 2023-05-12 PROCEDURE — 6370000000 HC RX 637 (ALT 250 FOR IP): Performed by: NURSE PRACTITIONER

## 2023-05-12 PROCEDURE — 1100000003 HC PRIVATE W/ TELEMETRY

## 2023-05-12 PROCEDURE — 6370000000 HC RX 637 (ALT 250 FOR IP): Performed by: INTERNAL MEDICINE

## 2023-05-12 PROCEDURE — 80053 COMPREHEN METABOLIC PANEL: CPT

## 2023-05-12 PROCEDURE — 6360000002 HC RX W HCPCS: Performed by: INTERNAL MEDICINE

## 2023-05-12 PROCEDURE — 36415 COLL VENOUS BLD VENIPUNCTURE: CPT

## 2023-05-12 PROCEDURE — 97535 SELF CARE MNGMENT TRAINING: CPT

## 2023-05-12 PROCEDURE — 2580000003 HC RX 258: Performed by: NURSE PRACTITIONER

## 2023-05-12 PROCEDURE — 97530 THERAPEUTIC ACTIVITIES: CPT

## 2023-05-12 RX ADMIN — SODIUM CHLORIDE, PRESERVATIVE FREE 10 ML: 5 INJECTION INTRAVENOUS at 20:32

## 2023-05-12 RX ADMIN — SODIUM CHLORIDE, PRESERVATIVE FREE 10 ML: 5 INJECTION INTRAVENOUS at 08:04

## 2023-05-12 RX ADMIN — ASPIRIN 81 MG 81 MG: 81 TABLET ORAL at 18:35

## 2023-05-12 RX ADMIN — FUROSEMIDE 40 MG: 10 INJECTION, SOLUTION INTRAMUSCULAR; INTRAVENOUS at 10:50

## 2023-05-12 RX ADMIN — LACTULOSE 20 G: 20 SOLUTION ORAL at 20:32

## 2023-05-12 RX ADMIN — LACTULOSE 20 G: 20 SOLUTION ORAL at 10:49

## 2023-05-12 RX ADMIN — FUROSEMIDE 40 MG: 10 INJECTION, SOLUTION INTRAMUSCULAR; INTRAVENOUS at 18:41

## 2023-05-12 RX ADMIN — PANTOPRAZOLE SODIUM 40 MG: 40 TABLET, DELAYED RELEASE ORAL at 10:48

## 2023-05-13 LAB
ALBUMIN SERPL-MCNC: 3.1 G/DL (ref 3.2–4.6)
ALBUMIN/GLOB SERPL: 1.2 (ref 0.4–1.6)
ALP SERPL-CCNC: 161 U/L (ref 50–136)
ALT SERPL-CCNC: 916 U/L (ref 12–65)
ANION GAP SERPL CALC-SCNC: 7 MMOL/L (ref 2–11)
AST SERPL-CCNC: 319 U/L (ref 15–37)
BILIRUB SERPL-MCNC: 2.8 MG/DL (ref 0.2–1.1)
BUN SERPL-MCNC: 40 MG/DL (ref 8–23)
CALCIUM SERPL-MCNC: 8 MG/DL (ref 8.3–10.4)
CHLORIDE SERPL-SCNC: 108 MMOL/L (ref 101–110)
CO2 SERPL-SCNC: 26 MMOL/L (ref 21–32)
CREAT SERPL-MCNC: 1.2 MG/DL (ref 0.8–1.5)
GLOBULIN SER CALC-MCNC: 2.6 G/DL (ref 2.8–4.5)
GLUCOSE SERPL-MCNC: 112 MG/DL (ref 65–100)
POTASSIUM SERPL-SCNC: 3.2 MMOL/L (ref 3.5–5.1)
PROT SERPL-MCNC: 5.7 G/DL (ref 6.3–8.2)
SODIUM SERPL-SCNC: 141 MMOL/L (ref 133–143)

## 2023-05-13 PROCEDURE — 6370000000 HC RX 637 (ALT 250 FOR IP): Performed by: INTERNAL MEDICINE

## 2023-05-13 PROCEDURE — 99232 SBSQ HOSP IP/OBS MODERATE 35: CPT | Performed by: INTERNAL MEDICINE

## 2023-05-13 PROCEDURE — 83735 ASSAY OF MAGNESIUM: CPT

## 2023-05-13 PROCEDURE — 6360000002 HC RX W HCPCS: Performed by: INTERNAL MEDICINE

## 2023-05-13 PROCEDURE — 2580000003 HC RX 258: Performed by: NURSE PRACTITIONER

## 2023-05-13 PROCEDURE — 80053 COMPREHEN METABOLIC PANEL: CPT

## 2023-05-13 PROCEDURE — 84132 ASSAY OF SERUM POTASSIUM: CPT

## 2023-05-13 PROCEDURE — 6370000000 HC RX 637 (ALT 250 FOR IP): Performed by: NURSE PRACTITIONER

## 2023-05-13 PROCEDURE — 1100000003 HC PRIVATE W/ TELEMETRY

## 2023-05-13 PROCEDURE — 36415 COLL VENOUS BLD VENIPUNCTURE: CPT

## 2023-05-13 RX ORDER — DOXAZOSIN MESYLATE 1 MG/1
1 TABLET ORAL ONCE
Status: COMPLETED | OUTPATIENT
Start: 2023-05-13 | End: 2023-05-13

## 2023-05-13 RX ORDER — LANOLIN ALCOHOL/MO/W.PET/CERES
9 CREAM (GRAM) TOPICAL NIGHTLY PRN
Status: DISCONTINUED | OUTPATIENT
Start: 2023-05-13 | End: 2023-05-16

## 2023-05-13 RX ADMIN — SODIUM CHLORIDE, PRESERVATIVE FREE 10 ML: 5 INJECTION INTRAVENOUS at 09:08

## 2023-05-13 RX ADMIN — LACTULOSE 20 G: 20 SOLUTION ORAL at 20:22

## 2023-05-13 RX ADMIN — FUROSEMIDE 40 MG: 10 INJECTION, SOLUTION INTRAMUSCULAR; INTRAVENOUS at 17:37

## 2023-05-13 RX ADMIN — POTASSIUM CHLORIDE 40 MEQ: 1500 TABLET, EXTENDED RELEASE ORAL at 09:07

## 2023-05-13 RX ADMIN — LACTULOSE 20 G: 20 SOLUTION ORAL at 09:07

## 2023-05-13 RX ADMIN — DOXAZOSIN 1 MG: 1 TABLET ORAL at 21:35

## 2023-05-13 RX ADMIN — PANTOPRAZOLE SODIUM 40 MG: 40 TABLET, DELAYED RELEASE ORAL at 09:08

## 2023-05-13 RX ADMIN — Medication 9 MG: at 20:22

## 2023-05-13 RX ADMIN — FUROSEMIDE 40 MG: 10 INJECTION, SOLUTION INTRAMUSCULAR; INTRAVENOUS at 09:08

## 2023-05-13 RX ADMIN — SODIUM CHLORIDE, PRESERVATIVE FREE 10 ML: 5 INJECTION INTRAVENOUS at 20:22

## 2023-05-13 RX ADMIN — ASPIRIN 81 MG 81 MG: 81 TABLET ORAL at 17:37

## 2023-05-13 ASSESSMENT — ENCOUNTER SYMPTOMS
ABDOMINAL DISTENTION: 0
COLOR CHANGE: 0
EYE DISCHARGE: 0
APNEA: 0

## 2023-05-14 LAB
ALBUMIN SERPL-MCNC: 2.8 G/DL (ref 3.2–4.6)
ALBUMIN/GLOB SERPL: 1.1 (ref 0.4–1.6)
ALP SERPL-CCNC: 147 U/L (ref 50–136)
ALT SERPL-CCNC: 661 U/L (ref 12–65)
ANION GAP SERPL CALC-SCNC: 3 MMOL/L (ref 2–11)
AST SERPL-CCNC: 180 U/L (ref 15–37)
BILIRUB SERPL-MCNC: 2.7 MG/DL (ref 0.2–1.1)
BUN SERPL-MCNC: 32 MG/DL (ref 8–23)
CALCIUM SERPL-MCNC: 8.1 MG/DL (ref 8.3–10.4)
CHLORIDE SERPL-SCNC: 107 MMOL/L (ref 101–110)
CO2 SERPL-SCNC: 28 MMOL/L (ref 21–32)
CREAT SERPL-MCNC: 1.1 MG/DL (ref 0.8–1.5)
GLOBULIN SER CALC-MCNC: 2.6 G/DL (ref 2.8–4.5)
GLUCOSE SERPL-MCNC: 103 MG/DL (ref 65–100)
MAGNESIUM SERPL-MCNC: 2.4 MG/DL (ref 1.8–2.4)
POTASSIUM SERPL-SCNC: 3.2 MMOL/L (ref 3.5–5.1)
POTASSIUM SERPL-SCNC: 4.3 MMOL/L (ref 3.5–5.1)
PROT SERPL-MCNC: 5.4 G/DL (ref 6.3–8.2)
SODIUM SERPL-SCNC: 138 MMOL/L (ref 133–143)

## 2023-05-14 PROCEDURE — 2580000003 HC RX 258: Performed by: NURSE PRACTITIONER

## 2023-05-14 PROCEDURE — 36415 COLL VENOUS BLD VENIPUNCTURE: CPT

## 2023-05-14 PROCEDURE — 99232 SBSQ HOSP IP/OBS MODERATE 35: CPT | Performed by: INTERNAL MEDICINE

## 2023-05-14 PROCEDURE — 6370000000 HC RX 637 (ALT 250 FOR IP): Performed by: NURSE PRACTITIONER

## 2023-05-14 PROCEDURE — 6360000002 HC RX W HCPCS: Performed by: NURSE PRACTITIONER

## 2023-05-14 PROCEDURE — 80053 COMPREHEN METABOLIC PANEL: CPT

## 2023-05-14 PROCEDURE — 1100000003 HC PRIVATE W/ TELEMETRY

## 2023-05-14 PROCEDURE — 6360000002 HC RX W HCPCS: Performed by: INTERNAL MEDICINE

## 2023-05-14 PROCEDURE — 6370000000 HC RX 637 (ALT 250 FOR IP): Performed by: INTERNAL MEDICINE

## 2023-05-14 RX ORDER — DOXAZOSIN MESYLATE 1 MG/1
1 TABLET ORAL ONCE
Status: COMPLETED | OUTPATIENT
Start: 2023-05-14 | End: 2023-05-14

## 2023-05-14 RX ORDER — DOPAMINE HYDROCHLORIDE 320 MG/100ML
2.5 INJECTION, SOLUTION INTRAVENOUS CONTINUOUS
Status: DISCONTINUED | OUTPATIENT
Start: 2023-05-14 | End: 2023-05-16

## 2023-05-14 RX ORDER — DOPAMINE HYDROCHLORIDE 320 MG/100ML
5 INJECTION, SOLUTION INTRAVENOUS CONTINUOUS
Status: DISCONTINUED | OUTPATIENT
Start: 2023-05-14 | End: 2023-05-14

## 2023-05-14 RX ADMIN — SODIUM CHLORIDE, PRESERVATIVE FREE 10 ML: 5 INJECTION INTRAVENOUS at 08:42

## 2023-05-14 RX ADMIN — PANTOPRAZOLE SODIUM 40 MG: 40 TABLET, DELAYED RELEASE ORAL at 08:41

## 2023-05-14 RX ADMIN — ASPIRIN 81 MG 81 MG: 81 TABLET ORAL at 17:17

## 2023-05-14 RX ADMIN — DOXAZOSIN 1 MG: 1 TABLET ORAL at 22:00

## 2023-05-14 RX ADMIN — POTASSIUM CHLORIDE 10 MEQ: 7.46 INJECTION, SOLUTION INTRAVENOUS at 02:41

## 2023-05-14 RX ADMIN — SODIUM CHLORIDE, PRESERVATIVE FREE 10 ML: 5 INJECTION INTRAVENOUS at 20:26

## 2023-05-14 RX ADMIN — LACTULOSE 20 G: 20 SOLUTION ORAL at 20:26

## 2023-05-14 RX ADMIN — POTASSIUM CHLORIDE 10 MEQ: 7.46 INJECTION, SOLUTION INTRAVENOUS at 01:44

## 2023-05-14 RX ADMIN — POTASSIUM BICARBONATE 40 MEQ: 391 TABLET, EFFERVESCENT ORAL at 06:09

## 2023-05-14 RX ADMIN — LACTULOSE 20 G: 20 SOLUTION ORAL at 08:41

## 2023-05-14 RX ADMIN — Medication 9 MG: at 22:08

## 2023-05-14 RX ADMIN — FUROSEMIDE 40 MG: 10 INJECTION, SOLUTION INTRAMUSCULAR; INTRAVENOUS at 08:41

## 2023-05-14 ASSESSMENT — ENCOUNTER SYMPTOMS
CHEST TIGHTNESS: 0
COLOR CHANGE: 0
ABDOMINAL PAIN: 0
EYE DISCHARGE: 0

## 2023-05-15 LAB
ALBUMIN SERPL-MCNC: 2.8 G/DL (ref 3.2–4.6)
ALBUMIN/GLOB SERPL: 1.1 (ref 0.4–1.6)
ALP SERPL-CCNC: 128 U/L (ref 50–136)
ALT SERPL-CCNC: 539 U/L (ref 12–65)
AMMONIA PLAS-SCNC: 22 UMOL/L (ref 11–32)
ANION GAP SERPL CALC-SCNC: 3 MMOL/L (ref 2–11)
AST SERPL-CCNC: 125 U/L (ref 15–37)
BASOPHILS # BLD: 0 K/UL (ref 0–0.2)
BASOPHILS NFR BLD: 0 % (ref 0–2)
BILIRUB SERPL-MCNC: 2.5 MG/DL (ref 0.2–1.1)
BUN SERPL-MCNC: 36 MG/DL (ref 8–23)
CALCIUM SERPL-MCNC: 8.2 MG/DL (ref 8.3–10.4)
CHLORIDE SERPL-SCNC: 107 MMOL/L (ref 101–110)
CO2 SERPL-SCNC: 28 MMOL/L (ref 21–32)
CREAT SERPL-MCNC: 1.1 MG/DL (ref 0.8–1.5)
DIFFERENTIAL METHOD BLD: ABNORMAL
EOSINOPHIL # BLD: 0.1 K/UL (ref 0–0.8)
EOSINOPHIL NFR BLD: 1 % (ref 0.5–7.8)
ERYTHROCYTE [DISTWIDTH] IN BLOOD BY AUTOMATED COUNT: 19.2 % (ref 11.9–14.6)
GLOBULIN SER CALC-MCNC: 2.5 G/DL (ref 2.8–4.5)
GLUCOSE SERPL-MCNC: 107 MG/DL (ref 65–100)
HCT VFR BLD AUTO: 33.7 % (ref 41.1–50.3)
HGB BLD-MCNC: 10.3 G/DL (ref 13.6–17.2)
IMM GRANULOCYTES # BLD AUTO: 0 K/UL (ref 0–0.5)
IMM GRANULOCYTES NFR BLD AUTO: 0 % (ref 0–5)
LYMPHOCYTES # BLD: 0.8 K/UL (ref 0.5–4.6)
LYMPHOCYTES NFR BLD: 10 % (ref 13–44)
MCH RBC QN AUTO: 25.1 PG (ref 26.1–32.9)
MCHC RBC AUTO-ENTMCNC: 30.6 G/DL (ref 31.4–35)
MCV RBC AUTO: 82.2 FL (ref 82–102)
MONOCYTES # BLD: 0.6 K/UL (ref 0.1–1.3)
MONOCYTES NFR BLD: 9 % (ref 4–12)
NEUTS SEG # BLD: 5.9 K/UL (ref 1.7–8.2)
NEUTS SEG NFR BLD: 79 % (ref 43–78)
NRBC # BLD: 0 K/UL (ref 0–0.2)
PLATELET # BLD AUTO: 116 K/UL (ref 150–450)
PMV BLD AUTO: ABNORMAL FL (ref 9.4–12.3)
POTASSIUM SERPL-SCNC: 3.4 MMOL/L (ref 3.5–5.1)
PROT SERPL-MCNC: 5.3 G/DL (ref 6.3–8.2)
RBC # BLD AUTO: 4.1 M/UL (ref 4.23–5.6)
SODIUM SERPL-SCNC: 138 MMOL/L (ref 133–143)
WBC # BLD AUTO: 7.4 K/UL (ref 4.3–11.1)

## 2023-05-15 PROCEDURE — 97110 THERAPEUTIC EXERCISES: CPT

## 2023-05-15 PROCEDURE — 6370000000 HC RX 637 (ALT 250 FOR IP): Performed by: NURSE PRACTITIONER

## 2023-05-15 PROCEDURE — 2500000003 HC RX 250 WO HCPCS: Performed by: INTERNAL MEDICINE

## 2023-05-15 PROCEDURE — 6370000000 HC RX 637 (ALT 250 FOR IP): Performed by: INTERNAL MEDICINE

## 2023-05-15 PROCEDURE — 99232 SBSQ HOSP IP/OBS MODERATE 35: CPT | Performed by: INTERNAL MEDICINE

## 2023-05-15 PROCEDURE — 1100000003 HC PRIVATE W/ TELEMETRY

## 2023-05-15 PROCEDURE — 36415 COLL VENOUS BLD VENIPUNCTURE: CPT

## 2023-05-15 PROCEDURE — 85025 COMPLETE CBC W/AUTO DIFF WBC: CPT

## 2023-05-15 PROCEDURE — 97530 THERAPEUTIC ACTIVITIES: CPT

## 2023-05-15 PROCEDURE — 82140 ASSAY OF AMMONIA: CPT

## 2023-05-15 PROCEDURE — 6360000002 HC RX W HCPCS: Performed by: NURSE PRACTITIONER

## 2023-05-15 PROCEDURE — 80053 COMPREHEN METABOLIC PANEL: CPT

## 2023-05-15 PROCEDURE — 2580000003 HC RX 258: Performed by: NURSE PRACTITIONER

## 2023-05-15 RX ORDER — POTASSIUM CHLORIDE 20 MEQ/1
40 TABLET, EXTENDED RELEASE ORAL
Status: DISCONTINUED | OUTPATIENT
Start: 2023-05-15 | End: 2023-05-16 | Stop reason: ALTCHOICE

## 2023-05-15 RX ADMIN — ASPIRIN 81 MG 81 MG: 81 TABLET ORAL at 17:43

## 2023-05-15 RX ADMIN — DOPAMINE HYDROCHLORIDE 2.5 MCG/KG/MIN: 320 INJECTION, SOLUTION INTRAVENOUS at 18:34

## 2023-05-15 RX ADMIN — LACTULOSE 20 G: 20 SOLUTION ORAL at 21:25

## 2023-05-15 RX ADMIN — PANTOPRAZOLE SODIUM 40 MG: 40 TABLET, DELAYED RELEASE ORAL at 08:15

## 2023-05-15 RX ADMIN — POTASSIUM BICARBONATE 40 MEQ: 391 TABLET, EFFERVESCENT ORAL at 05:19

## 2023-05-15 RX ADMIN — SODIUM CHLORIDE, PRESERVATIVE FREE 10 ML: 5 INJECTION INTRAVENOUS at 08:25

## 2023-05-15 RX ADMIN — POTASSIUM CHLORIDE 40 MEQ: 1500 TABLET, EXTENDED RELEASE ORAL at 08:14

## 2023-05-15 RX ADMIN — SODIUM CHLORIDE, PRESERVATIVE FREE 10 ML: 5 INJECTION INTRAVENOUS at 21:25

## 2023-05-15 RX ADMIN — TUBERCULIN PURIFIED PROTEIN DERIVATIVE 5 UNITS: 5 INJECTION, SOLUTION INTRADERMAL at 11:29

## 2023-05-15 NOTE — CARE COORDINATION
LOS 6 D  CM met with patient and his daughter, Kimberly Iniguez this AM. Patient eating breakfast. On room air. Dopamine @ 2.5 mcg/kg/min infusing. Stacy reports she is waiting to speak with the Cardiologist.     1015 CM informed by nurse extender that plan is for patient to discharge to [facility] where his spouse is currently in rehab. CM contacted patient's daughter/Angelic ONEILL to discuss DCP. Td Dutton reports patient's house has not been kept up and he can't discharge home. Td Dutton is agreeable to patient discharging to [facility] as her mother is still there in rehab and patient hasn't seen his spouse since he admitted to the hospital.  Td Dutton reports she will come and speak with patient.    CM will send referral.

## 2023-05-15 NOTE — PROGRESS NOTES
[facility] CARDIOLOGY PROGRESS NOTE           5/15/2023 8:02 AM    Admit Date: 5/8/2023      Subjective:   Patient resting comfortably. Feels better today. LFTs and renal function improved on dopamine. Has significant resting tachycardia and persistent hypotension. ROS:  Cardiovascular:  As noted above    Objective:      Vitals:    05/15/23 0000 05/15/23 0049 05/15/23 0418 05/15/23 0502   BP: 87/63 87/63 95/66    Pulse: (!) 104 (!) 103 97    Resp:  22 18    Temp:  98.1 °F (36.7 °C) 98.1 °F (36.7 °C)    TempSrc:       SpO2:  92% 97%    Weight:    104 lb 14.4 oz (47.6 kg)   Height:           Physical Exam:  General-No Acute Distress  Neck- supple, no JVD  CV- regular rate and rhythm no MRG  Lung- clear bilaterally  Abd- soft, nontender, nondistended  Ext- no edema bilaterally. Skin- warm and dry    Data Review:   Recent Labs     05/13/23  2338 05/14/23  0722 05/15/23  0358   NA  --  138 138   K 3.2* 4.3 3.4*   MG 2.4  --   --    BUN  --  32* 36*   WBC  --   --  7.4   HGB  --   --  10.3*   HCT  --   --  33.7*   PLT  --   --  116*       Assessment/Plan:     Principal Problem:    Weakness  Plan: Suspect this is related to end-stage biventricular heart failure. We will hold IV furosemide today and likely resume p.o. tomorrow. Continue dopamine and additional day. Active Problems:    Acute renal failure (ARF) (HCC)  Plan: Resolved    Benign hypertension  Plan: Patient currently struggles with hypotension given severe end-stage biventricular heart failure. Holding all therapies    Coronary artery disease involving native coronary artery of native heart without angina pectoris  Plan: Patient denies angina. HFrEF (heart failure with reduced ejection fraction) ([facility] [address]  Plan: Patient with complex severe biventricular heart failure. Patient was admitted with both renal and liver failure. This is improving slowly.   We will hold furosemide today as patient does not appear to have significant

## 2023-05-15 NOTE — PROGRESS NOTES
ACUTE PHYSICAL THERAPY GOALS:   (Developed with and agreed upon by patient and/or caregiver. )  LTG:  (1.)Mr. Libby Huang will move from supine to sit and sit to supine , scoot up and down, and roll side to side in bed with INDEPENDENCE within 7 treatment day(s). (2.)Mr. Libby Huang will transfer from bed to chair and chair to bed with MODIFIED INDEPENDENCE using the least restrictive device within 7 treatment day(s). (3.)Mr. Libby Huang will ambulate with MODIFIED INDEPENDENCE for 400 feet with the least restrictive device within 7 treatment day(s). (4.)Mr. Libby Huang will participate in therapeutic activity/exercises x 25 minutes for increased activity tolerance within 7 treatment days. (5.)Mr. Libby Huang will ascend and descend 2 stairs using R hand rail(s) with SUPERVISION to improve functional mobility and safety within 7 day(s). PHYSICAL THERAPY: Daily Note AM   (Link to Caseload Tracking: PT Visit Days : 2  Time In/Out PT Charge Capture  Rehab Caseload Tracker  Orders    Akira Veras is a 81 y.o. male   PRIMARY DIAGNOSIS: Weakness  Weakness [R53.1]       Inpatient: Payor: Lida Bartlett / Plan: MEDICARE PART A AND B / Product Type: *No Product type* /     ASSESSMENT:     REHAB RECOMMENDATIONS:   Recommendation to date pending progress:  Setting:  Home Health Therapy    Equipment:    To Be Determined     ASSESSMENT:  Mr. Libby Huang was supine in bed and agreeable to treatment. Pt was able to come to sitting on EOB with SBA today and good-fair sitting balance. He performed several STS transfers with RW and CGA-SBA. Pt also ambulated several short bouts with CGA/RW and decreased gait speed. He performed seated exercises from recliner as well today. Pt progressed in ambulation and activity tolerance.      SUBJECTIVE:   Mr. Libby Huang states, \"I don't think I can play the horn anymore\"     Social/Functional Lives With: Alone  Home Equipment: milla Storm  Receives Help From: Family  ADL Assistance:

## 2023-05-15 NOTE — PROGRESS NOTES
2021 - Increased Dopamine gtt to 5 mcg/kg/hr per Mark Economy, NP for SBP of 80 and MAP at or slightly below 65 with manual BP check. Drip increased to see effect on pt's HR. HR currently upper 90s to low 100s. 2155 - Pt's HR starting to increase and sustain in low 120s.  Dopamine gtt decreased back down to 2.5 mcg/kg/hr per Mark Economy, NP.

## 2023-05-16 LAB
ALBUMIN SERPL-MCNC: 3 G/DL (ref 3.2–4.6)
ALBUMIN/GLOB SERPL: 1 (ref 0.4–1.6)
ALP SERPL-CCNC: 136 U/L (ref 50–136)
ALT SERPL-CCNC: 469 U/L (ref 12–65)
ANION GAP SERPL CALC-SCNC: ABNORMAL MMOL/L (ref 2–11)
AST SERPL-CCNC: 131 U/L (ref 15–37)
BASOPHILS # BLD: 0 K/UL (ref 0–0.2)
BASOPHILS NFR BLD: 0 % (ref 0–2)
BILIRUB SERPL-MCNC: 2.8 MG/DL (ref 0.2–1.1)
BUN SERPL-MCNC: 43 MG/DL (ref 8–23)
CALCIUM SERPL-MCNC: 8.5 MG/DL (ref 8.3–10.4)
CHLORIDE SERPL-SCNC: 106 MMOL/L (ref 101–110)
CO2 SERPL-SCNC: 27 MMOL/L (ref 21–32)
CREAT SERPL-MCNC: 1.3 MG/DL (ref 0.8–1.5)
DIFFERENTIAL METHOD BLD: ABNORMAL
EOSINOPHIL # BLD: 0 K/UL (ref 0–0.8)
EOSINOPHIL NFR BLD: 1 % (ref 0.5–7.8)
ERYTHROCYTE [DISTWIDTH] IN BLOOD BY AUTOMATED COUNT: 19.7 % (ref 11.9–14.6)
GLOBULIN SER CALC-MCNC: 3.1 G/DL (ref 2.8–4.5)
GLUCOSE SERPL-MCNC: 103 MG/DL (ref 65–100)
HCT VFR BLD AUTO: 32.8 % (ref 41.1–50.3)
HGB BLD-MCNC: 10.5 G/DL (ref 13.6–17.2)
IMM GRANULOCYTES # BLD AUTO: 0 K/UL (ref 0–0.5)
IMM GRANULOCYTES NFR BLD AUTO: 0 % (ref 0–5)
LYMPHOCYTES # BLD: 0.8 K/UL (ref 0.5–4.6)
LYMPHOCYTES NFR BLD: 11 % (ref 13–44)
MCH RBC QN AUTO: 26.1 PG (ref 26.1–32.9)
MCHC RBC AUTO-ENTMCNC: 32 G/DL (ref 31.4–35)
MCV RBC AUTO: 81.6 FL (ref 82–102)
MM INDURATION, POC: 0 MM (ref 0–5)
MONOCYTES # BLD: 0.6 K/UL (ref 0.1–1.3)
MONOCYTES NFR BLD: 9 % (ref 4–12)
NEUTS SEG # BLD: 5.3 K/UL (ref 1.7–8.2)
NEUTS SEG NFR BLD: 79 % (ref 43–78)
NRBC # BLD: 0 K/UL (ref 0–0.2)
PLATELET # BLD AUTO: 147 K/UL (ref 150–450)
PMV BLD AUTO: ABNORMAL FL (ref 9.4–12.3)
POTASSIUM SERPL-SCNC: 4.9 MMOL/L (ref 3.5–5.1)
POTASSIUM SERPL-SCNC: 5.2 MMOL/L (ref 3.5–5.1)
POTASSIUM SERPL-SCNC: 5.2 MMOL/L (ref 3.5–5.1)
PPD, POC: NEGATIVE
PROT SERPL-MCNC: 6.1 G/DL (ref 6.3–8.2)
RBC # BLD AUTO: 4.02 M/UL (ref 4.23–5.6)
SODIUM SERPL-SCNC: 132 MMOL/L (ref 133–143)
WBC # BLD AUTO: 6.7 K/UL (ref 4.3–11.1)

## 2023-05-16 PROCEDURE — 2700000000 HC OXYGEN THERAPY PER DAY

## 2023-05-16 PROCEDURE — 6370000000 HC RX 637 (ALT 250 FOR IP): Performed by: NURSE PRACTITIONER

## 2023-05-16 PROCEDURE — 6370000000 HC RX 637 (ALT 250 FOR IP): Performed by: INTERNAL MEDICINE

## 2023-05-16 PROCEDURE — 6360000002 HC RX W HCPCS: Performed by: INTERNAL MEDICINE

## 2023-05-16 PROCEDURE — 85025 COMPLETE CBC W/AUTO DIFF WBC: CPT

## 2023-05-16 PROCEDURE — 6360000002 HC RX W HCPCS: Performed by: NURSE PRACTITIONER

## 2023-05-16 PROCEDURE — 80053 COMPREHEN METABOLIC PANEL: CPT

## 2023-05-16 PROCEDURE — 36415 COLL VENOUS BLD VENIPUNCTURE: CPT

## 2023-05-16 PROCEDURE — 2580000003 HC RX 258: Performed by: NURSE PRACTITIONER

## 2023-05-16 PROCEDURE — 84132 ASSAY OF SERUM POTASSIUM: CPT

## 2023-05-16 PROCEDURE — 1100000003 HC PRIVATE W/ TELEMETRY

## 2023-05-16 PROCEDURE — 2500000003 HC RX 250 WO HCPCS: Performed by: NURSE PRACTITIONER

## 2023-05-16 PROCEDURE — 99232 SBSQ HOSP IP/OBS MODERATE 35: CPT | Performed by: INTERNAL MEDICINE

## 2023-05-16 RX ORDER — LORAZEPAM 2 MG/ML
1 INJECTION INTRAMUSCULAR
Status: DISCONTINUED | OUTPATIENT
Start: 2023-05-16 | End: 2023-05-17 | Stop reason: HOSPADM

## 2023-05-16 RX ORDER — DOPAMINE HYDROCHLORIDE 160 MG/100ML
2.5 INJECTION, SOLUTION INTRAVENOUS CONTINUOUS
Status: DISCONTINUED | OUTPATIENT
Start: 2023-05-16 | End: 2023-05-16

## 2023-05-16 RX ORDER — MORPHINE SULFATE 2 MG/ML
2 INJECTION, SOLUTION INTRAMUSCULAR; INTRAVENOUS
Status: DISCONTINUED | OUTPATIENT
Start: 2023-05-16 | End: 2023-05-17 | Stop reason: HOSPADM

## 2023-05-16 RX ORDER — LORAZEPAM 0.5 MG/1
0.25 TABLET ORAL EVERY 6 HOURS PRN
Status: DISCONTINUED | OUTPATIENT
Start: 2023-05-16 | End: 2023-05-16

## 2023-05-16 RX ORDER — GLYCOPYRROLATE 0.2 MG/ML
0.1 INJECTION INTRAMUSCULAR; INTRAVENOUS EVERY 4 HOURS PRN
Status: DISCONTINUED | OUTPATIENT
Start: 2023-05-16 | End: 2023-05-17 | Stop reason: HOSPADM

## 2023-05-16 RX ORDER — HYDROCODONE BITARTRATE AND ACETAMINOPHEN 5; 325 MG/1; MG/1
1 TABLET ORAL
Status: DISCONTINUED | OUTPATIENT
Start: 2023-05-16 | End: 2023-05-16

## 2023-05-16 RX ORDER — TAMSULOSIN HYDROCHLORIDE 0.4 MG/1
0.4 CAPSULE ORAL DAILY
Status: DISCONTINUED | OUTPATIENT
Start: 2023-05-16 | End: 2023-05-17 | Stop reason: HOSPADM

## 2023-05-16 RX ORDER — TAMSULOSIN HYDROCHLORIDE 0.4 MG/1
0.4 CAPSULE ORAL DAILY
Status: DISCONTINUED | OUTPATIENT
Start: 2023-05-16 | End: 2023-05-16

## 2023-05-16 RX ORDER — DOPAMINE HYDROCHLORIDE 320 MG/100ML
2.5 INJECTION, SOLUTION INTRAVENOUS CONTINUOUS
Status: DISCONTINUED | OUTPATIENT
Start: 2023-05-16 | End: 2023-05-16

## 2023-05-16 RX ADMIN — ASPIRIN 81 MG 81 MG: 81 TABLET ORAL at 17:29

## 2023-05-16 RX ADMIN — MORPHINE SULFATE 2 MG: 2 INJECTION, SOLUTION INTRAMUSCULAR; INTRAVENOUS at 20:44

## 2023-05-16 RX ADMIN — LORAZEPAM 0.25 MG: 0.5 TABLET ORAL at 14:00

## 2023-05-16 RX ADMIN — GLYCOPYRROLATE 0.1 MG: 0.2 INJECTION INTRAMUSCULAR; INTRAVENOUS at 21:48

## 2023-05-16 RX ADMIN — TAMSULOSIN HYDROCHLORIDE 0.4 MG: 0.4 CAPSULE ORAL at 02:30

## 2023-05-16 RX ADMIN — DOPAMINE HYDROCHLORIDE 2.5 MCG/KG/MIN: 320 INJECTION, SOLUTION INTRAVENOUS at 17:22

## 2023-05-16 RX ADMIN — SODIUM CHLORIDE, PRESERVATIVE FREE 10 ML: 5 INJECTION INTRAVENOUS at 08:10

## 2023-05-16 RX ADMIN — POTASSIUM CHLORIDE 40 MEQ: 1500 TABLET, EXTENDED RELEASE ORAL at 08:05

## 2023-05-16 RX ADMIN — SODIUM CHLORIDE, PRESERVATIVE FREE 10 ML: 5 INJECTION INTRAVENOUS at 20:44

## 2023-05-16 RX ADMIN — LACTULOSE 20 G: 20 SOLUTION ORAL at 08:03

## 2023-05-16 RX ADMIN — PANTOPRAZOLE SODIUM 40 MG: 40 TABLET, DELAYED RELEASE ORAL at 08:06

## 2023-05-16 RX ADMIN — LORAZEPAM 1 MG: 2 INJECTION INTRAMUSCULAR; INTRAVENOUS at 22:37

## 2023-05-16 ASSESSMENT — PAIN DESCRIPTION - LOCATION: LOCATION: GENERALIZED

## 2023-05-16 ASSESSMENT — PAIN SCALES - GENERAL: PAINLEVEL_OUTOF10: 6

## 2023-05-16 ASSESSMENT — PAIN DESCRIPTION - DESCRIPTORS: DESCRIPTORS: SORE

## 2023-05-16 NOTE — PLAN OF CARE
Problem: Discharge Planning  Goal: Discharge to home or other facility with appropriate resources  Outcome: Progressing  Flowsheets (Taken 5/16/2023 0813)  Discharge to home or other facility with appropriate resources: Identify barriers to discharge with patient and caregiver     Problem: Skin/Tissue Integrity  Goal: Absence of new skin breakdown  Description: 1. Monitor for areas of redness and/or skin breakdown  2. Assess vascular access sites hourly  3. Every 4-6 hours minimum:  Change oxygen saturation probe site  4. Every 4-6 hours:  If on nasal continuous positive airway pressure, respiratory therapy assess nares and determine need for appliance change or resting period.   Outcome: Progressing     Problem: Safety - Adult  Goal: Free from fall injury  Outcome: Progressing  Flowsheets (Taken 5/16/2023 0813)  Free From Fall Injury: Instruct family/caregiver on patient safety     Problem: ABCDS Injury Assessment  Goal: Absence of physical injury  Outcome: Progressing  Flowsheets (Taken 5/16/2023 0813)  Absence of Physical Injury: Implement safety measures based on patient assessment     Problem: Chronic Conditions and Co-morbidities  Goal: Patient's chronic conditions and co-morbidity symptoms are monitored and maintained or improved  Outcome: Progressing  Flowsheets (Taken 5/16/2023 0813)  Care Plan - Patient's Chronic Conditions and Co-Morbidity Symptoms are Monitored and Maintained or Improved:   Monitor and assess patient's chronic conditions and comorbid symptoms for stability, deterioration, or improvement   Collaborate with multidisciplinary team to address chronic and comorbid conditions and prevent exacerbation or deterioration

## 2023-05-16 NOTE — PROGRESS NOTES
[facility] CARDIOLOGY PROGRESS NOTE           5/16/2023 7:58 AM    Admit Date: 5/8/2023      Subjective:   Patient resting comfortably. Feels better today. LFTs and renal function improved on dopamine. Has significant resting tachycardia on dopamine. ROS:  Cardiovascular:  As noted above    Objective:      Vitals:    05/15/23 1726 05/15/23 2017 05/16/23 0027 05/16/23 0421   BP: 97/64 90/65 (!) 94/50 100/73   Pulse: (!) 115 (!) 112 (!) 110 (!) 105   Resp:  20 20 20   Temp: 97.5 °F (36.4 °C) 98.3 °F (36.8 °C) 98.5 °F (36.9 °C) 98.2 °F (36.8 °C)   TempSrc:       SpO2: 96% 98% 100% 95%   Weight:    108 lb 1.6 oz (49 kg)   Height:           Physical Exam:  General-No Acute Distress  Neck- supple, no JVD  CV- regular rate and rhythm no MRG  Lung- clear bilaterally  Abd- soft, nontender, nondistended  Ext- no edema bilaterally. Skin- warm and dry    Data Review:   Recent Labs     05/13/23  2338 05/14/23  0722 05/15/23  0358   NA  --  138 138   K 3.2* 4.3 3.4*   MG 2.4  --   --    BUN  --  32* 36*   WBC  --   --  7.4   HGB  --   --  10.3*   HCT  --   --  33.7*   PLT  --   --  116*         Assessment/Plan:     Principal Problem:    Weakness  Plan: This is related to end-stage biventricular heart failure. Discontinue furosemide as patient's volume status is stable. We will discontinue dopamine today and monitor patient's response off of inotropes. Active Problems:    Acute renal failure (ARF) (HCC)  Plan: Resolved    Benign hypertension  Plan: Patient currently struggles with hypotension given severe end-stage biventricular heart failure. Holding all therapies    Coronary artery disease involving native coronary artery of native heart without angina pectoris  Plan: Patient denies angina. HFrEF (heart failure with reduced ejection fraction) ([facility] [address]  Plan: Patient with complex severe biventricular heart failure. Patient was admitted with both renal and liver failure.   This is improving

## 2023-05-16 NOTE — PROGRESS NOTES
Spiritual Care Visit, initial visit. Visited with patient and family at bedside. Patient is said to have terminal CHF. 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 His wife is in another facility, but several family members were present, and this  could sense the love being given to him this afternoon. Left a card with chaplains' phone number so chaplains can be contacted. Prayed for patient's healing and health. Visit by Maine Mckeon, Staff .  Man., Th.B., B.A.

## 2023-05-16 NOTE — PROGRESS NOTES
PT Daily Note:  Attempted to see patient for physical therapy this morning but patient refused to participate stating, \"I'm too weak\". Encouraged patient to participate including transferring OOB to recliner chair but he continued to refuse. Will check back on patient at a later date/time if schedule permits. Thank you,  Annamaria Poole.  DANIEL Wilson

## 2023-05-16 NOTE — CARE COORDINATION
CM following for continued hospitalization. Dopamine drip discontinued this AM. Patient now having bouts of SOB. Charge nurse informing Cardiology. Daughters at bedside. Patient is a DNR status. CM remains available to assist with any new discharge needs.

## 2023-05-17 VITALS
HEIGHT: 67 IN | HEART RATE: 96 BPM | DIASTOLIC BLOOD PRESSURE: 67 MMHG | OXYGEN SATURATION: 100 % | TEMPERATURE: 97.5 F | WEIGHT: 108.1 LBS | BODY MASS INDEX: 16.97 KG/M2 | RESPIRATION RATE: 18 BRPM | SYSTOLIC BLOOD PRESSURE: 91 MMHG

## 2023-05-17 PROBLEM — I20.0 UNSTABLE ANGINA (HCC): Status: ACTIVE | Noted: 2018-05-02

## 2023-05-17 PROBLEM — I63.9 CEREBRAL INFARCTION, UNSPECIFIED (HCC): Status: ACTIVE | Noted: 2018-05-02

## 2023-05-17 PROBLEM — I50.33 ACUTE ON CHRONIC CONGESTIVE HEART FAILURE WITH LEFT VENTRICULAR DIASTOLIC DYSFUNCTION (HCC): Status: ACTIVE | Noted: 2023-05-17

## 2023-05-17 PROBLEM — Z66 DNR (DO NOT RESUSCITATE): Status: ACTIVE | Noted: 2023-01-01

## 2023-05-17 PROBLEM — N18.9 ACUTE KIDNEY INJURY SUPERIMPOSED ON CHRONIC KIDNEY DISEASE (HCC): Status: ACTIVE | Noted: 2022-01-01

## 2023-05-17 PROBLEM — R07.9 CHEST PAIN, UNSPECIFIED: Status: ACTIVE | Noted: 2018-05-02

## 2023-05-17 PROCEDURE — 6360000002 HC RX W HCPCS: Performed by: INTERNAL MEDICINE

## 2023-05-17 PROCEDURE — 6360000002 HC RX W HCPCS: Performed by: NURSE PRACTITIONER

## 2023-05-17 PROCEDURE — 2580000003 HC RX 258: Performed by: NURSE PRACTITIONER

## 2023-05-17 RX ADMIN — MORPHINE SULFATE 2 MG: 2 INJECTION, SOLUTION INTRAMUSCULAR; INTRAVENOUS at 05:54

## 2023-05-17 RX ADMIN — MORPHINE SULFATE 2 MG: 2 INJECTION, SOLUTION INTRAMUSCULAR; INTRAVENOUS at 17:18

## 2023-05-17 RX ADMIN — MORPHINE SULFATE 2 MG: 2 INJECTION, SOLUTION INTRAMUSCULAR; INTRAVENOUS at 12:41

## 2023-05-17 RX ADMIN — LORAZEPAM 1 MG: 2 INJECTION INTRAMUSCULAR; INTRAVENOUS at 09:19

## 2023-05-17 RX ADMIN — SODIUM CHLORIDE, PRESERVATIVE FREE 10 ML: 5 INJECTION INTRAVENOUS at 09:00

## 2023-05-17 ASSESSMENT — PAIN SCALES - GENERAL: PAINLEVEL_OUTOF10: 0

## 2023-05-17 NOTE — PROGRESS NOTES
[facility]     Referral received and discussed with Osbaldo Yepez CM. Hospice Liaison will contact patient/family for hospice presentation and evaluation. Thank you for this referral.      Liaison met with daughter Ava Trammell at bedside to discuss hospice philosophy of care, hospice team members and frequency of visits. We also discussed the [facility] for general inpatient care with symptom management and respite care. Answered all questions. Thank you for the referral to [address] Clif received approval for [facility]. Clif set up transport for 330 PM. Transport packet assembled and left at desk per [address]. Lazarus Shade, RN  Hospice Nurse Liaison  [phone]: C  [phone]:  O

## 2023-05-17 NOTE — DISCHARGE SUMMARY
[facility] Cardiology Discharge Summary     Patient ID:  Ronny Delgado  [id number]  41 y.o.  1935    Admit date: 5/8/2023    Discharge date:  05/17/23     Admitting Physician: Víctor Vallejo MD     Discharge Physician: YAYO Chaidez CNP/Dr. Aiden Wilson    Admission Diagnoses: Weakness [R53.1]    Discharge Diagnoses:     Principal Problem:    Weakness  Active Problems:    DNR (do not resuscitate)    Acute renal failure (ARF) ([facility] [address]    Benign hypertension    Coronary artery disease involving native coronary artery of native heart without angina pectoris    HFrEF (heart failure with reduced ejection fraction) ([facility] [address]    Hypercholesteremia    Congestive heart failure (CHF) ([facility] [address]    Hospice care    Debility    Fatigue  Resolved Problems:    * No resolved hospital problems. *          Cardiology Procedures this admission:   None  Consults: GI, Palliative, Hospice     Hospital Course: Patient presents now with primarily right ventricular symptoms of abdominal distention and hepatic ingestion with elevated LFTs. With LUIS MIGUEL ACE/ARB/ARNI were avoided. Initial attempts of IV diuresis resulted in worsening Cr and LFT's. He was started on midodrine and ultimately dopamine due to hypotension. GI was consulted for liver function and thought to all be secondary to liver congestion with right sided heart failure. He diuresed well and felt better for a total of 6.5 L removed this hospital admission with the help of dopamine and stopped once volume status was stable. Dopamine was DC when patients BP was stable as well. Palliative care saw the patient due to end stage biventricular failure with discussion of goals of care. With continue symptoms the patient ultimately was sent home with hospice and comfort measures in the hospital.        The afternoon of 05/17/23 patient was transport at the families request to [address] [place] [address] for end of life inpatient care.  If needed the patient family can

## 2023-05-17 NOTE — PROGRESS NOTES
Spiritual Care Visit, follow up visit. Possible end of life. Visited with patient/family at bedside. Prayed for patient's healing and health, and for family's emotional need. .    Visit by [facility] Joel Redman, Staff .  Man., Th.B., B.A.

## 2023-05-17 NOTE — MANAGEMENT PLAN
Notified by primary RN that family and patient wish to proceed with comfort care measures tonight. Long discussion earlier today with Primary cardiologist, family and patient regarding patient's prognosis and plan of care. Family requesting that patient be transitioned to comfort care tonight.      Plan:   -- IV robinul ordered  -- oral ativan dose changed to IV  -- stop all unnecessary testing    YAYO Banks - FREDDIE  10:02 PM

## 2023-05-17 NOTE — PROGRESS NOTES
PT Discharge Note:  Per chart review, patient is now comfort care. Will discharge patient from PT caseload at this time. Please reorder PT if patient status changes and he would benefit from our services in the future. Thank you,  Eliezer Dodson.  Steve, DANIEL

## 2023-05-17 NOTE — PROGRESS NOTES
[facility] received call from Family Member who was no longer in the Hospital. Family Member tearfully requested [facility] give support to PT and Family at Hospital as PT is EOL. Family Member expressed great appreciation for [facility] Don's care. [facility] offered comforting spiritual presence, active listening, and prayer ending with 23rd Psalm to Family Member on phone. [facility]  checked in with RNs. PT was in bed with Granddaughter and Daughters at Bedside. Granddaughter recognized [facility] as Granddaughter is an RN. Family appeared to be grieving appropriately for the situation. [facility] talked gently to PT. PT expressed importance of living a good life and being a good person. PT is Hinduism. [facility] offered hospitality and comforting spiritual presence. [facility] checked for unmet needs.  offered support. Rev. Pati Lin M.Div.

## 2023-05-17 NOTE — PROGRESS NOTES
[facility] CARDIOLOGY PROGRESS NOTE           5/17/2023 11:44 AM    Admit Date: 5/8/2023      Subjective:   -No complaints of chest pain or any significant dyspnea overnight. Had some transient anxiety last night and is now on hospice Meds and much more comfortable at this point. ROS:  Cardiovascular:  As noted above    Objective:      Vitals:    05/16/23 1837 05/16/23 2029 05/17/23 0545 05/17/23 0845   BP: 89/68 90/65 93/67 91/67   Pulse: (!) 102 (!) 113  96   Resp: 19 18     Temp: 97.5 °F (36.4 °C)      TempSrc:       SpO2: 100% 95%  100%   Weight:       Height:           Physical Exam:  General-No Acute Distress, on oxygen by nasal cannula, asleep  Neck- supple, mild JVD  CV- regular rate and rhythm no MRG  Lung-diminished breath sounds both bases posteriorly but otherwise fairly clear to auscultation bilaterally anteriorly and laterally  Abd- soft, nontender, nondistended  Ext- no edema bilaterally. Skin- warm and dry    Data Review:     Lab Results   Component Value Date/Time     05/16/2023 08:20 AM    K 5.2 05/16/2023 12:46 PM     05/16/2023 08:20 AM    CO2 27 05/16/2023 08:20 AM    BUN 43 05/16/2023 08:20 AM    CREATININE 1.30 05/16/2023 08:20 AM    GLUCOSE 103 05/16/2023 08:20 AM    CALCIUM 8.5 05/16/2023 08:20 AM         Lab Results   Component Value Date    WBC 6.7 05/16/2023    HGB 10.5 (L) 05/16/2023    HCT 32.8 (L) 05/16/2023    MCV 81.6 (L) 05/16/2023     (L) 05/16/2023 05/04/23    TRANSTHORACIC ECHOCARDIOGRAM (TTE) COMPLETE (CONTRAST/BUBBLE/3D PRN) 05/05/2023  6:58 AM (Final)    Interpretation Summary    Left Ventricle: Severely reduced left ventricular systolic function with a visually estimated EF of 20 - 25%. Left ventricle size is normal. Normal wall thickness. Severe global hypokinesis present. Indeterminate diastolic function. Right Ventricle: Right ventricle is mildly dilated. RV basal diameter is 4.9 cm. RV mid diameter is 4.4 cm.

## 2023-05-17 NOTE — PROGRESS NOTES
2056: Patients daughter Marge Rodgers [facility]) decided that they now wanted patient to be comfort care. Maurice Guthrie MD notified. Orders to stop dopamine drip. 2127: Thais Zapata NP also notified that patient wanted to be comfort measures. Orders adjust appropriately.

## 2023-05-17 NOTE — PROGRESS NOTES
TRANSFER - OUT REPORT:    Verbal report given to Alysia Soto RN on Angela Colby  being transferred to [facility]  for routine progression of patient care       Report consisted of patient's Situation, Background, Assessment and   Recommendations(SBAR). Information from the following report(s) Index, MAR, Recent Results, and Med Rec Status was reviewed with the receiving nurse. [place] Assessment: No data recorded  Lines:   Peripheral IV 05/14/23 Distal;Right; Anterior Cephalic (Active)   Site Assessment Clean, dry & intact 05/17/23 1230   Line Status Flushed;Capped 05/17/23 1230   Line Care Connections checked and tightened 05/17/23 1230   Phlebitis Assessment No symptoms 05/17/23 1230   Infiltration Assessment 0 05/17/23 1230   Alcohol Cap Used Yes 05/17/23 1230   Dressing Status Clean, dry & intact 05/17/23 1230   Dressing Type Transparent 05/17/23 1230        Opportunity for questions and clarification was provided.

## 2023-05-17 NOTE — CARE COORDINATION
CM met in patient's daughters, Zohaib Fitzgerald and Meghann Stubbs in patient's room to discuss hospice house consult. Patient is resting with eyes closed. O2 at 3 lpm.   Stacy informs CM that patient asked not to die in the hospital yesterday but she goes on to say at this point that's not affecting their decision. One of caveat mentioned is patient's spouse who is in rehab at The [address] [place] [address] may be admitting back to [facility] today due to increased swelling. CM returned to patient's room to inquire of patient's spouse's name and Zohaib Fitzgerald [facility]) reports she's decided they wish him to go to [address] while he has \"the window\" to transport. CM sent referral for [address] to [facility]. Awaiting liaison contact to discuss referral.     [phone] CM informed patient accepted GIP for [address]. Transport arranged for 1530 with [employer].  CM informed patient's spouse, Cordell King, is in [facility] ER 6 to be admitted. Spouse is requesting transport to bring patient by to see her for \"goodbye\" in ER 6 prior to exiting the hospital. Daughter, Meghann Stubbs,  voices hope this can transpire. CM spoke with [facility] dispatch and informed of request. CM informed it has been noted.

## 2023-06-01 PROBLEM — I50.40 COMBINED SYSTOLIC AND DIASTOLIC CONGESTIVE HEART FAILURE (HCC): Status: ACTIVE | Noted: 2023-01-01
